# Patient Record
Sex: FEMALE | Race: WHITE | Employment: FULL TIME | URBAN - METROPOLITAN AREA
[De-identification: names, ages, dates, MRNs, and addresses within clinical notes are randomized per-mention and may not be internally consistent; named-entity substitution may affect disease eponyms.]

---

## 2017-02-06 DIAGNOSIS — I10 ESSENTIAL HYPERTENSION: ICD-10-CM

## 2017-02-06 RX ORDER — METOPROLOL SUCCINATE 25 MG/1
TABLET, EXTENDED RELEASE ORAL
Qty: 30 TABLET | Refills: 0 | Status: SHIPPED | OUTPATIENT
Start: 2017-02-06 | End: 2017-03-10 | Stop reason: SDUPTHER

## 2017-03-10 DIAGNOSIS — I10 ESSENTIAL HYPERTENSION: ICD-10-CM

## 2017-03-10 RX ORDER — METOPROLOL SUCCINATE 25 MG/1
TABLET, EXTENDED RELEASE ORAL
Qty: 15 TABLET | Refills: 0 | Status: SHIPPED | OUTPATIENT
Start: 2017-03-10 | End: 2017-05-24 | Stop reason: SDUPTHER

## 2017-05-24 ENCOUNTER — OFFICE VISIT (OUTPATIENT)
Dept: FAMILY MEDICINE CLINIC | Age: 47
End: 2017-05-24

## 2017-05-24 VITALS
SYSTOLIC BLOOD PRESSURE: 116 MMHG | RESPIRATION RATE: 16 BRPM | HEART RATE: 55 BPM | HEIGHT: 68 IN | OXYGEN SATURATION: 99 % | DIASTOLIC BLOOD PRESSURE: 74 MMHG | TEMPERATURE: 98.5 F

## 2017-05-24 DIAGNOSIS — R10.11 RIGHT UPPER QUADRANT ABDOMINAL PAIN: Primary | ICD-10-CM

## 2017-05-24 DIAGNOSIS — I10 ESSENTIAL HYPERTENSION: ICD-10-CM

## 2017-05-24 PROCEDURE — 99213 OFFICE O/P EST LOW 20 MIN: CPT | Performed by: FAMILY MEDICINE

## 2017-05-24 RX ORDER — METOPROLOL SUCCINATE 25 MG/1
TABLET, EXTENDED RELEASE ORAL
Qty: 90 TABLET | Refills: 1 | Status: SHIPPED | OUTPATIENT
Start: 2017-05-24 | End: 2017-11-28 | Stop reason: SDUPTHER

## 2017-05-24 ASSESSMENT — PATIENT HEALTH QUESTIONNAIRE - PHQ9
SUM OF ALL RESPONSES TO PHQ QUESTIONS 1-9: 0
1. LITTLE INTEREST OR PLEASURE IN DOING THINGS: 0
SUM OF ALL RESPONSES TO PHQ9 QUESTIONS 1 & 2: 0
2. FEELING DOWN, DEPRESSED OR HOPELESS: 0

## 2017-05-24 ASSESSMENT — ENCOUNTER SYMPTOMS: RESPIRATORY NEGATIVE: 1

## 2017-06-08 ENCOUNTER — HOSPITAL ENCOUNTER (OUTPATIENT)
Dept: ULTRASOUND IMAGING | Age: 47
Discharge: HOME | End: 2017-06-08
Attending: FAMILY MEDICINE | Admitting: FAMILY MEDICINE

## 2017-06-08 DIAGNOSIS — R10.11 RIGHT UPPER QUADRANT ABDOMINAL PAIN: ICD-10-CM

## 2017-10-13 ENCOUNTER — HOSPITAL ENCOUNTER (OUTPATIENT)
Dept: MAMMOGRAPHY | Age: 47
Discharge: HOME | End: 2017-10-13
Attending: FAMILY MEDICINE | Admitting: FAMILY MEDICINE

## 2017-10-13 DIAGNOSIS — Z12.31 ENCOUNTER FOR SCREENING MAMMOGRAM FOR BREAST CANCER: ICD-10-CM

## 2017-10-16 DIAGNOSIS — R92.0 MICROCALCIFICATION OF RIGHT BREAST ON MAMMOGRAM: Primary | ICD-10-CM

## 2017-10-23 ENCOUNTER — NURSE ONLY (OUTPATIENT)
Dept: MAMMOGRAPHY | Age: 47
End: 2017-10-23

## 2017-10-23 ENCOUNTER — HOSPITAL ENCOUNTER (OUTPATIENT)
Dept: MAMMOGRAPHY | Age: 47
Discharge: HOME | End: 2017-10-23
Attending: FAMILY MEDICINE | Admitting: FAMILY MEDICINE

## 2017-10-23 DIAGNOSIS — R92.8 ABNORMAL MAMMOGRAM OF RIGHT BREAST: Primary | ICD-10-CM

## 2017-10-23 DIAGNOSIS — R92.8 ABNORMAL MAMMOGRAM: ICD-10-CM

## 2017-10-26 ENCOUNTER — HOSPITAL ENCOUNTER (OUTPATIENT)
Dept: INTERVENTIONAL RADIOLOGY/VASCULAR | Age: 47
Discharge: HOME | End: 2017-10-26
Attending: FAMILY MEDICINE | Admitting: FAMILY MEDICINE

## 2017-10-26 DIAGNOSIS — R92.8 ABNORMAL MAMMOGRAM: ICD-10-CM

## 2017-10-26 DIAGNOSIS — Z98.890 STATUS POST RIGHT BREAST BIOPSY: ICD-10-CM

## 2017-10-26 NOTE — PROGRESS NOTES
firm pressure on the site for 10 minutes. If it continues, or your breast is enlarging or becoming hard on the inside, you may have bleeding inside the breast. You should continue to hold pressure and go to the emergency department. 11. Watch for signs of infection, swelling, pain, fever, tenderness, heat or redness around the biopsy site. Call a  Nurse Navigator, Leland Rosenthal or Javed Mccann at [phone] for questions. 12. If you have a problem that cant wait until the next business day, call the Radiology Department at [phone] and ask to speak to the radiologist.  13. The final results of your biopsy are usually available in two to three working days. The results of your biopsy will be sent to your referring physician and either they or the nurse navigator will call you. Patient verbalized understanding and was given a copy of these instructions.

## 2017-10-30 ENCOUNTER — TELEPHONE (OUTPATIENT)
Dept: FAMILY MEDICINE CLINIC | Age: 47
End: 2017-10-30

## 2017-10-30 DIAGNOSIS — R89.7 ABNORMAL BREAST BIOPSY: Primary | ICD-10-CM

## 2017-10-31 ENCOUNTER — TELEPHONE (OUTPATIENT)
Dept: FAMILY MEDICINE CLINIC | Age: 47
End: 2017-10-31

## 2017-10-31 DIAGNOSIS — F41.9 ANXIETY: Primary | ICD-10-CM

## 2017-10-31 DIAGNOSIS — R92.8 ABNORMAL MAMMOGRAM: Primary | ICD-10-CM

## 2017-10-31 RX ORDER — ALPRAZOLAM 0.25 MG/1
TABLET ORAL
Qty: 2 TABLET | Refills: 0 | Status: SHIPPED | OUTPATIENT
Start: 2017-10-31 | End: 2017-12-07

## 2017-10-31 NOTE — TELEPHONE ENCOUNTER
Patient has to go for a second breast biopsy. She was speaking to a nurse care coordinator and she was telling her how anxious she was at the first one, and now has to do it again. The nurse suggested she call you and ask if you would be willing to give her something to help with the anxiety. Maybe 2 pills, one to take the night before and one to take the day of the procedure.   [address] [place])

## 2017-10-31 NOTE — TELEPHONE ENCOUNTER
[facility] calling to say the patient needs another order for the Stereotatic breast biopsy of the Right breast. She had one done previously, but they need her to come in for another one for same breast, different site. Please place order in system. The test date will be 11/13/17.

## 2017-11-13 ENCOUNTER — HOSPITAL ENCOUNTER (OUTPATIENT)
Dept: INTERVENTIONAL RADIOLOGY/VASCULAR | Age: 47
Discharge: HOME | End: 2017-11-13
Attending: FAMILY MEDICINE | Admitting: FAMILY MEDICINE

## 2017-11-13 DIAGNOSIS — R92.8 ABNORMAL MAMMOGRAM: ICD-10-CM

## 2017-11-13 DIAGNOSIS — R92.0 ABNORMAL FINDING ON MAMMOGRAPHY, MICROCALCIFICATION: ICD-10-CM

## 2017-11-13 NOTE — PROGRESS NOTES
Dr. Paulie Burgess had more films taken before biopsy. Talked to patient. Walked the patient to the room, discussed the procedure in detail and answered questions. Verbalizes understanding. Reviewed the health history, allergies and medications. Consent form signed. Nurse Navigator reviewed the following instructions with the patient. Care After a Needle Core Biopsy (Stereotactic, Ultrasound or MRI biopsy)   1. Apply ice pack to the breast (inside of your bra) 15 minutes on then 15 minutes off for first 3 hours after your procedure, then as needed until bedtime to relieve/minimize potential swelling and bruising. 2. Wear a firm fitting bra or sports bra for 24-48 hours (including sleep) to minimize movement and support. 3. The local anesthetic wears off about 3 hours after the biopsy. You may use over the counter medication of choice for pain as needed. Some discomfort/tenderness is not unusual.   4. You may resume all stopped medications:  ______________________________. 5. You may return to work after the biopsy, but no heavy lifting (more than 5-10 lbs), strenuous exercise, weight lifting, repetitive movement on the affected side, including household activities, for 48 hours. 6. To prevent infection: do not submerge the biopsy site under water (bathing, swimming, hot tub) until steri-strips are removed. 7. Leave the clear covering and dressing on for 24 hours then shower and remove the covering and dressing but keep the thin strips of tape (steri-strips) in place for 5-7 days, keeping the area clean and dry. 8. Some bruising is normal after a biopsy. It may be present when you remove the dressing or may appear within the next 48 hours. It will disappear in a few days to a couple of weeks. 9. For several days to several weeks you may have some tenderness, twinges and a little bump where the needle went into the skin.   This can be bothersome but it is not abnormal, and will go

## 2017-11-16 ENCOUNTER — HOSPITAL ENCOUNTER (OUTPATIENT)
Dept: OTHER | Age: 47
Discharge: HOME | End: 2017-11-16
Attending: SURGERY | Admitting: SURGERY

## 2017-11-16 ENCOUNTER — SURG/PROC ORDERS (OUTPATIENT)
Dept: SURGERY | Age: 47
End: 2017-11-16

## 2017-11-16 ENCOUNTER — INITIAL CONSULT (OUTPATIENT)
Dept: SURGERY | Age: 47
End: 2017-11-16

## 2017-11-16 VITALS
DIASTOLIC BLOOD PRESSURE: 63 MMHG | HEIGHT: 69 IN | BODY MASS INDEX: 22.22 KG/M2 | HEART RATE: 64 BPM | WEIGHT: 150 LBS | SYSTOLIC BLOOD PRESSURE: 120 MMHG | TEMPERATURE: 98.5 F

## 2017-11-16 DIAGNOSIS — N60.99 ATYPICAL DUCTAL HYPERPLASIA OF BREAST: Primary | ICD-10-CM

## 2017-11-16 DIAGNOSIS — Z91.89 AT HIGH RISK FOR BREAST CANCER: ICD-10-CM

## 2017-11-16 LAB
CREAT SERPL-MCNC: 0.9 MG/DL (ref 0.6–1.1)
GFR AFRICAN AMERICAN: >60
GFR NON-AFRICAN AMERICAN: >60

## 2017-11-16 PROCEDURE — 99204 OFFICE O/P NEW MOD 45 MIN: CPT | Performed by: SURGERY

## 2017-11-16 RX ORDER — SODIUM CHLORIDE, SODIUM LACTATE, POTASSIUM CHLORIDE, CALCIUM CHLORIDE 600; 310; 30; 20 MG/100ML; MG/100ML; MG/100ML; MG/100ML
INJECTION, SOLUTION INTRAVENOUS CONTINUOUS
Status: CANCELLED | OUTPATIENT
Start: 2017-11-16

## 2017-11-16 RX ORDER — SODIUM CHLORIDE 0.9 % (FLUSH) 0.9 %
10 SYRINGE (ML) INJECTION EVERY 12 HOURS SCHEDULED
Status: CANCELLED | OUTPATIENT
Start: 2017-11-16

## 2017-11-16 RX ORDER — LIDOCAINE HYDROCHLORIDE 10 MG/ML
0.1 INJECTION, SOLUTION EPIDURAL; INFILTRATION; INTRACAUDAL; PERINEURAL
Status: CANCELLED | OUTPATIENT
Start: 2017-11-16 | End: 2017-11-16

## 2017-11-16 RX ORDER — SODIUM CHLORIDE 0.9 % (FLUSH) 0.9 %
10 SYRINGE (ML) INJECTION PRN
Status: CANCELLED | OUTPATIENT
Start: 2017-11-16

## 2017-11-16 ASSESSMENT — ENCOUNTER SYMPTOMS
GASTROINTESTINAL NEGATIVE: 1
RESPIRATORY NEGATIVE: 1
EYES NEGATIVE: 1

## 2017-11-16 NOTE — PATIENT INSTRUCTIONS
Patient Education        Breast Self-Exam: Care Instructions  Your Care Instructions  A breast self-exam is when you check your breasts for lumps or changes. This regular exam helps you learn how your breasts normally look and feel. Most breast problems or changes are not because of cancer. Breast self-exam is not a substitute for a mammogram. Having regular breast exams by your doctor and regular mammograms improve your chances of finding any problems with your breasts. Some women set a time each month to do a step-by-step breast self-exam. Other women like a less formal system. They might look at their breasts as they brush their teeth, or feel their breasts once in a while in the shower. If you notice a change in your breast, tell your doctor. Follow-up care is a key part of your treatment and safety. Be sure to make and go to all appointments, and call your doctor if you are having problems. Its also a good idea to know your test results and keep a list of the medicines you take. How do you do a breast self-exam?  · The best time to examine your breasts is usually one week after your menstrual period begins. Your breasts should not be tender then. If you do not have periods, you might do your exam on a day of the month that is easy to remember. · To examine your breasts:  ¨ Remove all your clothes above the waist and lie down. When you are lying down, your breast tissue spreads evenly over your chest wall, which makes it easier to feel all your breast tissue. ¨ Use the padsnot the fingertipsof the 3 middle fingers of your left hand to check your right breast. Move your fingers slowly in small coin-sized circles that overlap. ¨ Use three levels of pressure to feel of all your breast tissue. Use light pressure to feel the tissue close to the skin surface. Use medium pressure to feel a little deeper. Use firm pressure to feel your tissue close to your breastbone and ribs.  Use each pressure level to feel your breast tissue before moving on to the next spot. ¨ Check your entire breast, moving up and down as if following a strip from the collarbone to the bra line, and from the armpit to the ribs. Repeat until you have covered the entire breast.  ¨ Repeat this procedure for your left breast, using the pads of the 3 middle fingers of your right hand. · To examine your breasts while in the shower:  ¨ Place one arm over your head and lightly soap your breast on that side. ¨ Using the pads of your fingers, gently move your hand over your breast (in the strip pattern described above), feeling carefully for any lumps or changes. ¨ Repeat for the other breast.  · Have your doctor inspect anything you notice to see if you need further testing. Where can you learn more? Go to [email or web address]://[email or web address].[email or web address]. org and sign in to your [employer] account. Enter P148 in the Mulu box to learn more about \"Breast Self-Exam: Care Instructions. \"     If you do not have an account, please click on the \"Sign Up Now\" link. Current as of: July 26, 2016  Content Version: 11.3  © 1863-5615 [employer]. Care instructions adapted under license by [facility] ([facility]). If you have questions about a medical condition or this instruction, always ask your healthcare professional. [address] any warranty or liability for your use of this information.

## 2017-11-16 NOTE — PROGRESS NOTES
Pathology  FINAL SURGICAL PATHOLOGY REPORT  Patient Name: Tanesha Brandt           Accession No:  [id number]   Age Sex:   1970    47 Y / F       Location:      [facility]  Account No:   [de-identified]                 Collected:     2017  Med Rec No:    [id number]                 Received:      2017  Attend Phys:   Yun Feliciano MD             Completed:     11/15/2017  Perform Phys: Yun Feliciano MD            FINAL DIAGNOSIS:    Breast, right, 9 o' clock, biopsy:     - Atypical ductal hyperplasia occurring in a background of breast       tissue with mild fibrocystic changes and microcalcifications. COMMENT: Immunohistochemical staining with appropriate controls reveals  the cells show loss of  and CK5/6, supporting the diagnosis. This  case was seen in intradepartmental consultation with consensus. Correlation with clinical and radiologic findings is recommended with an  excision if more definitive classification is clinically warranted. [address] [place] [address]          Department of Pathology  FINAL SURGICAL PATHOLOGY REPORT  Patient Name: Tanesha Brandt         Accession No:  [id number]   Age Sex:   1970    47 Y / F       Location:      [facility]  Account No:   [de-identified]                 Collected:     10/26/2017  Med Rec No:    [id number]                 Received:      10/26/2017  Attend Phys:   Yun Feliciano MD             Completed:     10/30/2017  Perform Phys: Yun Feliciano MD            FINAL DIAGNOSIS:    Breast, right, biopsy:     - Atypical ductal hyperplasia occurring in a background of breast       tissue with fibrocystic changes and microcalcifications.     COMMENT:  CK5/6 and  immunohistochemical staining with appropriate controls  reveal a loss of staining in foci with morphologic findings suggestive of  atypia, supporting the above diagnosis. Gulshan Montanez and radiologic  correlation is recommended.    GARDY/GRADY      Preoperative Diagnosis: regular rate. Extremities appear well perfused. Pulmonary: respirations are non-labored and without audible distress  Lymphatics: no palpable axillary or cervical lymphadenopathy. Skin: No rash noted. No erythema. Neurologic: alert and oriented. Assessment/Plan: Ms. Sondra Crenshaw is a 56y.o. year-old woman with right breast atypical ductal hyperplasia    I reviewed with Ms. Sondra Crenshaw her most recent breast imaging and biopsy results. 1. We discussed the pathophysiology of her biopsy. Due to the 10-15% upgrade risk of invasive cancer or noninvasive cancer in the area surrounding her biopsy cavity, we recommended a 2 site excisional breast biopsy in the operating room. We discussed the technique of needle localization. I explained that on the day of surgery she'll be taken to radiology where a needle and small wire will be advanced to the location of the lesion. This will be done by targeting the lesion, near which the clip was placed post biopsy. This is performed using either mammographic or ultrasound guidance. We would then proceed to the operating room to remove the abnormal area within the breast. We discussed the risks and benefits of surgery which include but are not limited to bleeding, infection, wound complications, unappealing cosmetics, and the need to return to the operating room for a second procedure based on final pathology. 2. We discussed her pathology in detail including the upgrade risk to any invasive or noninvasive carcinoma. We discussed that her risk of a future cancer is at least 4 times higher in patients with atypical hyperplasia than the general population. We discussed the course of action should we find a noninvasive or invasive cancer on her final pathology. We discussed that this could lead to additional medical or surgical treatments. 3. We discussed assessing her breast cancer risk.  We performed a risk assessment using  ASH Breast Cancer Risk Evaluation Tool

## 2017-11-21 ENCOUNTER — HOSPITAL ENCOUNTER (OUTPATIENT)
Dept: MRI IMAGING | Age: 47
Discharge: HOME | End: 2017-11-21
Attending: SURGERY | Admitting: SURGERY

## 2017-11-21 DIAGNOSIS — N60.99 ATYPICAL DUCTAL HYPERPLASIA OF BREAST: ICD-10-CM

## 2017-11-21 DIAGNOSIS — Z91.89 AT HIGH RISK FOR BREAST CANCER: ICD-10-CM

## 2017-11-21 RX ORDER — SODIUM CHLORIDE 0.9 % (FLUSH) 0.9 %
10 SYRINGE (ML) INJECTION ONCE
Status: COMPLETED | OUTPATIENT
Start: 2017-11-21 | End: 2017-11-21

## 2017-11-21 RX ADMIN — Medication 10 ML: at 11:05

## 2017-11-22 ENCOUNTER — TELEPHONE (OUTPATIENT)
Dept: SURGERY | Age: 47
End: 2017-11-22

## 2017-11-22 DIAGNOSIS — N60.99 ATYPICAL DUCTAL HYPERPLASIA OF BREAST: Primary | ICD-10-CM

## 2017-11-28 DIAGNOSIS — I10 ESSENTIAL HYPERTENSION: ICD-10-CM

## 2017-11-28 RX ORDER — METOPROLOL SUCCINATE 25 MG/1
TABLET, EXTENDED RELEASE ORAL
Qty: 30 TABLET | Refills: 0 | Status: SHIPPED | OUTPATIENT
Start: 2017-11-28 | End: 2017-12-07 | Stop reason: SDUPTHER

## 2017-11-28 NOTE — TELEPHONE ENCOUNTER
She called in she has a new patient appoint with you on 12-7-17. She was a Dr Luis Felipe Bonner patient    She is going to run out of her Bp meds before then, can you send her enough I to hold her over?

## 2017-12-07 ENCOUNTER — HOSPITAL ENCOUNTER (OUTPATIENT)
Dept: INTERVENTIONAL RADIOLOGY/VASCULAR | Age: 47
Discharge: HOME | End: 2017-12-07
Attending: SURGERY | Admitting: SURGERY

## 2017-12-07 ENCOUNTER — OFFICE VISIT (OUTPATIENT)
Dept: FAMILY MEDICINE CLINIC | Age: 47
End: 2017-12-07

## 2017-12-07 VITALS
HEART RATE: 86 BPM | OXYGEN SATURATION: 98 % | SYSTOLIC BLOOD PRESSURE: 124 MMHG | TEMPERATURE: 98.2 F | DIASTOLIC BLOOD PRESSURE: 74 MMHG | HEIGHT: 69 IN | RESPIRATION RATE: 16 BRPM

## 2017-12-07 DIAGNOSIS — N60.91 ATYPICAL HYPERPLASIA OF RIGHT BREAST: ICD-10-CM

## 2017-12-07 DIAGNOSIS — F41.9 ANXIETY: ICD-10-CM

## 2017-12-07 DIAGNOSIS — Z01.818 PRE-OP EXAM: ICD-10-CM

## 2017-12-07 DIAGNOSIS — I10 ESSENTIAL HYPERTENSION: ICD-10-CM

## 2017-12-07 DIAGNOSIS — N60.99 ATYPICAL DUCTAL HYPERPLASIA OF BREAST: ICD-10-CM

## 2017-12-07 PROCEDURE — 93000 ELECTROCARDIOGRAM COMPLETE: CPT | Performed by: NURSE PRACTITIONER

## 2017-12-07 PROCEDURE — 99214 OFFICE O/P EST MOD 30 MIN: CPT | Performed by: NURSE PRACTITIONER

## 2017-12-07 RX ORDER — METOPROLOL SUCCINATE 25 MG/1
TABLET, EXTENDED RELEASE ORAL
Qty: 90 TABLET | Refills: 1 | Status: SHIPPED | OUTPATIENT
Start: 2017-12-07 | End: 2018-06-27 | Stop reason: SDUPTHER

## 2017-12-07 RX ORDER — M-VIT,TX,IRON,MINS/CALC/FOLIC 27MG-0.4MG
1 TABLET ORAL DAILY
COMMUNITY

## 2017-12-07 RX ORDER — UBIDECARENONE 75 MG
50 CAPSULE ORAL DAILY
COMMUNITY

## 2017-12-07 RX ORDER — DIAZEPAM 10 MG/1
TABLET ORAL
Qty: 1 TABLET | Refills: 0 | Status: SHIPPED | OUTPATIENT
Start: 2017-12-07 | End: 2017-12-08

## 2017-12-07 RX ORDER — 0.9 % SODIUM CHLORIDE 0.9 %
100 VIAL (ML) INJECTION
Status: COMPLETED | OUTPATIENT
Start: 2017-12-07 | End: 2017-12-07

## 2017-12-07 RX ADMIN — Medication 100 ML: at 16:02

## 2017-12-07 NOTE — PROGRESS NOTES
Walked the patient to the room, discussed the procedure in detail and answered questions. Verbalizes understanding. Reviewed the health history, allergies and medications. Consent form signed. Nurse Navigator reviewed the following instructions with the patient. Care After a Needle Core Biopsy (Stereotactic, Ultrasound or MRI biopsy)   1. Apply ice pack to the breast (inside of your bra) 15 minutes on then 15 minutes off for first 3 hours after your procedure, then as needed until bedtime to relieve/minimize potential swelling and bruising. 2. Wear a firm fitting bra or sports bra for 24-48 hours (including sleep) to minimize movement and support. 3. The local anesthetic wears off about 3 hours after the biopsy. You may use over the counter medication of choice for pain as needed. Some discomfort/tenderness is not unusual.   4. You may resume all stopped medications:  ______________________________. 5. You may return to work after the biopsy, but no heavy lifting (more than 5-10 lbs), strenuous exercise, weight lifting, repetitive movement on the affected side, including household activities, for 48 hours. 6. To prevent infection: do not submerge the biopsy site under water (bathing, swimming, hot tub) until steri-strips are removed. 7. Leave the clear covering and dressing on for 24 hours then shower and remove the covering and dressing but keep the thin strips of tape (steri-strips) in place for 5-7 days, keeping the area clean and dry. 8. Some bruising is normal after a biopsy. It may be present when you remove the dressing or may appear within the next 48 hours. It will disappear in a few days to a couple of weeks. 9. For several days to several weeks you may have some tenderness, twinges and a little bump where the needle went into the skin. This can be bothersome but it is not abnormal, and will go away.   10. If excessive bleeding, apply firm pressure on the site for 10 minutes. If it continues, or your breast is enlarging or becoming hard on the inside, you may have bleeding inside the breast. You should continue to hold pressure and go to the emergency department. 11. Watch for signs of infection, swelling, pain, fever, tenderness, heat or redness around the biopsy site. Call a  Nurse Navigator, Amy Andres or Gaurav Sales at [phone] for questions. 12. If you have a problem that cant wait until the next business day, call the Radiology Department at [phone] and ask to speak to the radiologist.  13. The final results of your biopsy are usually available in two to three working days. The results of your biopsy will be sent to your referring physician and either they or the nurse navigator will call you. Patient verbalized understanding and was given a copy of these instructions.

## 2017-12-07 NOTE — PROGRESS NOTES
Preoperative Consultation      Zeyad King  YOB: 1970    Date of Service:  12/7/2017    Vitals:    12/07/17 0804   BP: 124/74   Site: Left Arm   Position: Sitting   Cuff Size: Medium Adult   Pulse: 86   Resp: 16   Temp: 98.2 °F (36.8 °C)   TempSrc: Oral   SpO2: 98%   Height: 5' 8.5\" (1.74 m)      Wt Readings from Last 2 Encounters:   11/16/17 150 lb (68 kg)   03/21/13 150 lb (68 kg)     BP Readings from Last 3 Encounters:   12/07/17 124/74   11/16/17 120/63   05/24/17 116/74        Chief Complaint   Patient presents with   Suki Rodriguez Pre-op Exam     Excisional breast biopsy 12/13/17 Dr. Sabina Velasquez [Pseudoephedrine Hcl]      Nausea, increased heart rate     Outpatient Prescriptions Marked as Taking for the 12/7/17 encounter (Office Visit) with Wayne Rubi NP   Medication Sig Dispense Refill    OMEGA 3-6-9 FATTY ACIDS PO Take by mouth      Multiple Vitamins-Minerals (THERAPEUTIC MULTIVITAMIN-MINERALS) tablet Take 1 tablet by mouth daily      vitamin B-12 (CYANOCOBALAMIN) 100 MCG tablet Take 50 mcg by mouth daily      metoprolol succinate (TOPROL XL) 25 MG extended release tablet TAKE 1 TABLET BY MOUTH EVERY DAY 30 tablet 0    loratadine (CLARITIN) 10 MG tablet Take 10 mg by mouth daily      LEVORA 0.15/30, 28, 0.15-30 MG-MCG per tablet          This patient with atypical ductal hyperplasia of breast presents to the office today for a preoperative consultation at the request of surgeon,  who plans on performing right breast lesion excision biopsy on December 13 at [facility]. HTN, on metoprolol, controlled well. Family hx of CAD in father, pt had CT heart calcium score- was 0 about a year.   Very anxious about MRI, Xanax did not help in the past before the test.    Planned anesthesia: General   Known anesthesia problems: None, except nausea and vomiting  Bleeding risk: No recent or remote history of abnormal bleeding  Personal or FH of DVT/PE: No    Patient objection to receiving blood products: No    Patient Active Problem List   Diagnosis    Essential hypertension    Right upper quadrant abdominal pain       History reviewed. No pertinent past medical history. History reviewed. No pertinent surgical history. Family History   Problem Relation Age of Onset    No Known Problems Mother     Heart Disease Father 52     CAD, hx of CABG    No Known Problems Brother     No Known Problems Brother      Social History     Social History    Marital status:      Spouse name: N/A    Number of children: N/A    Years of education: N/A     Occupational History    Not on file. Social History Main Topics    Smoking status: Never Smoker    Smokeless tobacco: Never Used    Alcohol use No    Drug use: No    Sexual activity: Yes     Partners: Male     Other Topics Concern    Not on file     Social History Narrative    No narrative on file       Review of Systems  A comprehensive review of systems was negative except for what was noted in the HPI. Physical Exam   Constitutional: She is oriented to person, place, and time. She appears well-developed and well-nourished. No distress. HENT:   Head: Normocephalic and atraumatic. Mouth/Throat: Uvula is midline, oropharynx is clear and moist and mucous membranes are normal.   Eyes: Conjunctivae and EOM are normal. Pupils are equal, round, and reactive to light. Neck: Trachea normal and normal range of motion. Neck supple. No JVD present. Carotid bruit is not present. No mass and no thyromegaly present. Cardiovascular: Normal rate, regular rhythm, normal heart sounds and intact distal pulses. No murmur heard. Pulmonary/Chest: Effort normal and breath sounds normal.   Abdominal: Soft. Bowel sounds are normal. She exhibits no distension and no mass. There is no hepatosplenomegaly. No tenderness. Musculoskeletal: She exhibits no edema and no tenderness.

## 2017-12-08 ENCOUNTER — HOSPITAL ENCOUNTER (OUTPATIENT)
Dept: OTHER | Age: 47
Discharge: HOME | End: 2017-12-08
Attending: NURSE PRACTITIONER | Admitting: NURSE PRACTITIONER

## 2017-12-08 LAB
EKG ATRIAL RATE: 62 BPM
EKG DIAGNOSIS: NORMAL
EKG P AXIS: 80 DEGREES
EKG P-R INTERVAL: 140 MS
EKG Q-T INTERVAL: 416 MS
EKG QRS DURATION: 80 MS
EKG QTC CALCULATION (BAZETT): 422 MS
EKG R AXIS: 83 DEGREES
EKG T AXIS: 56 DEGREES
EKG VENTRICULAR RATE: 62 BPM

## 2017-12-08 PROCEDURE — 93010 ELECTROCARDIOGRAM REPORT: CPT | Performed by: INTERNAL MEDICINE

## 2017-12-13 ENCOUNTER — HOSPITAL ENCOUNTER (OUTPATIENT)
Dept: SURGERY | Age: 47
Discharge: HOME | End: 2017-12-13
Attending: SURGERY | Admitting: SURGERY

## 2017-12-13 VITALS
DIASTOLIC BLOOD PRESSURE: 73 MMHG | WEIGHT: 152.25 LBS | RESPIRATION RATE: 14 BRPM | TEMPERATURE: 98.4 F | HEART RATE: 72 BPM | BODY MASS INDEX: 22.55 KG/M2 | OXYGEN SATURATION: 99 % | SYSTOLIC BLOOD PRESSURE: 108 MMHG | HEIGHT: 69 IN

## 2017-12-13 DIAGNOSIS — R92.8 ABNORMAL MAMMOGRAM: ICD-10-CM

## 2017-12-13 LAB
ABO/RH: NORMAL
ANION GAP SERPL CALCULATED.3IONS-SCNC: 10 MMOL/L (ref 3–16)
ANTIBODY SCREEN: NORMAL
BUN BLDV-MCNC: 13 MG/DL (ref 7–20)
CALCIUM SERPL-MCNC: 9.2 MG/DL (ref 8.3–10.6)
CHLORIDE BLD-SCNC: 103 MMOL/L (ref 99–110)
CO2: 25 MMOL/L (ref 21–32)
CREAT SERPL-MCNC: 0.7 MG/DL (ref 0.6–1.1)
GFR AFRICAN AMERICAN: >60
GFR NON-AFRICAN AMERICAN: >60
GLUCOSE BLD-MCNC: 92 MG/DL (ref 70–99)
HCG(URINE) PREGNANCY TEST: NEGATIVE
HCT VFR BLD CALC: 44.1 % (ref 36–48)
HEMOGLOBIN: 14.6 G/DL (ref 12–16)
MCH RBC QN AUTO: 31.9 PG (ref 26–34)
MCHC RBC AUTO-ENTMCNC: 33.1 G/DL (ref 31–36)
MCV RBC AUTO: 96.4 FL (ref 80–100)
PDW BLD-RTO: 13.2 % (ref 12.4–15.4)
PLATELET # BLD: 235 K/UL (ref 135–450)
PMV BLD AUTO: 9.3 FL (ref 5–10.5)
POTASSIUM SERPL-SCNC: 4.6 MMOL/L (ref 3.5–5.1)
RBC # BLD: 4.58 M/UL (ref 4–5.2)
SODIUM BLD-SCNC: 138 MMOL/L (ref 136–145)
WBC # BLD: 6.2 K/UL (ref 4–11)

## 2017-12-13 PROCEDURE — 19125 EXCISION BREAST LESION: CPT | Performed by: SURGERY

## 2017-12-13 PROCEDURE — 14001 TIS TRNFR TRUNK 10.1-30SQCM: CPT | Performed by: SURGERY

## 2017-12-13 RX ORDER — MEPERIDINE HYDROCHLORIDE 25 MG/ML
12.5 INJECTION INTRAMUSCULAR; INTRAVENOUS; SUBCUTANEOUS EVERY 5 MIN PRN
Status: DISCONTINUED | OUTPATIENT
Start: 2017-12-13 | End: 2017-12-14 | Stop reason: HOSPADM

## 2017-12-13 RX ORDER — SODIUM CHLORIDE, SODIUM LACTATE, POTASSIUM CHLORIDE, CALCIUM CHLORIDE 600; 310; 30; 20 MG/100ML; MG/100ML; MG/100ML; MG/100ML
INJECTION, SOLUTION INTRAVENOUS CONTINUOUS
Status: DISCONTINUED | OUTPATIENT
Start: 2017-12-13 | End: 2017-12-14 | Stop reason: HOSPADM

## 2017-12-13 RX ORDER — PROMETHAZINE HYDROCHLORIDE 25 MG/ML
6.25 INJECTION, SOLUTION INTRAMUSCULAR; INTRAVENOUS EVERY 30 MIN PRN
Status: DISCONTINUED | OUTPATIENT
Start: 2017-12-13 | End: 2017-12-14 | Stop reason: HOSPADM

## 2017-12-13 RX ORDER — DIPHENHYDRAMINE HYDROCHLORIDE 50 MG/ML
12.5 INJECTION INTRAMUSCULAR; INTRAVENOUS
Status: ACTIVE | OUTPATIENT
Start: 2017-12-13 | End: 2017-12-13

## 2017-12-13 RX ORDER — HYDROCODONE BITARTRATE AND ACETAMINOPHEN 5; 325 MG/1; MG/1
1 TABLET ORAL PRN
Status: COMPLETED | OUTPATIENT
Start: 2017-12-13 | End: 2017-12-13

## 2017-12-13 RX ORDER — HYDROMORPHONE HCL 110MG/55ML
0.5 PATIENT CONTROLLED ANALGESIA SYRINGE INTRAVENOUS EVERY 5 MIN PRN
Status: DISCONTINUED | OUTPATIENT
Start: 2017-12-13 | End: 2017-12-14 | Stop reason: HOSPADM

## 2017-12-13 RX ORDER — FENTANYL CITRATE 50 UG/ML
50 INJECTION, SOLUTION INTRAMUSCULAR; INTRAVENOUS EVERY 5 MIN PRN
Status: DISCONTINUED | OUTPATIENT
Start: 2017-12-13 | End: 2017-12-14 | Stop reason: HOSPADM

## 2017-12-13 RX ORDER — HYDROMORPHONE HCL 110MG/55ML
0.25 PATIENT CONTROLLED ANALGESIA SYRINGE INTRAVENOUS EVERY 5 MIN PRN
Status: DISCONTINUED | OUTPATIENT
Start: 2017-12-13 | End: 2017-12-14 | Stop reason: HOSPADM

## 2017-12-13 RX ORDER — SODIUM CHLORIDE 0.9 % (FLUSH) 0.9 %
10 SYRINGE (ML) INJECTION PRN
Status: DISCONTINUED | OUTPATIENT
Start: 2017-12-13 | End: 2017-12-14 | Stop reason: HOSPADM

## 2017-12-13 RX ORDER — LIDOCAINE HYDROCHLORIDE 10 MG/ML
0.1 INJECTION, SOLUTION EPIDURAL; INFILTRATION; INTRACAUDAL; PERINEURAL
Status: ACTIVE | OUTPATIENT
Start: 2017-12-13 | End: 2017-12-13

## 2017-12-13 RX ORDER — SODIUM CHLORIDE 9 MG/ML
INJECTION, SOLUTION INTRAVENOUS CONTINUOUS
Status: DISCONTINUED | OUTPATIENT
Start: 2017-12-13 | End: 2017-12-14 | Stop reason: HOSPADM

## 2017-12-13 RX ORDER — CLINDAMYCIN PHOSPHATE 900 MG/50ML
900 INJECTION INTRAVENOUS
Status: DISCONTINUED | OUTPATIENT
Start: 2017-12-13 | End: 2017-12-13

## 2017-12-13 RX ORDER — HYDROCODONE BITARTRATE AND ACETAMINOPHEN 5; 325 MG/1; MG/1
2 TABLET ORAL PRN
Status: COMPLETED | OUTPATIENT
Start: 2017-12-13 | End: 2017-12-13

## 2017-12-13 RX ORDER — LIDOCAINE HYDROCHLORIDE 10 MG/ML
1 INJECTION, SOLUTION EPIDURAL; INFILTRATION; INTRACAUDAL; PERINEURAL
Status: ACTIVE | OUTPATIENT
Start: 2017-12-13 | End: 2017-12-13

## 2017-12-13 RX ORDER — SCOLOPAMINE TRANSDERMAL SYSTEM 1 MG/1
1 PATCH, EXTENDED RELEASE TRANSDERMAL
Status: DISCONTINUED | OUTPATIENT
Start: 2017-12-13 | End: 2017-12-14 | Stop reason: HOSPADM

## 2017-12-13 RX ORDER — HYDROCODONE BITARTRATE AND ACETAMINOPHEN 5; 325 MG/1; MG/1
TABLET ORAL
Qty: 40 TABLET | Refills: 0 | Status: SHIPPED | OUTPATIENT
Start: 2017-12-13 | End: 2017-12-22

## 2017-12-13 RX ORDER — SODIUM CHLORIDE 0.9 % (FLUSH) 0.9 %
10 SYRINGE (ML) INJECTION EVERY 12 HOURS SCHEDULED
Status: DISCONTINUED | OUTPATIENT
Start: 2017-12-13 | End: 2017-12-14 | Stop reason: HOSPADM

## 2017-12-13 RX ORDER — CEFAZOLIN SODIUM 2 G/100ML
2 INJECTION, SOLUTION INTRAVENOUS
Status: COMPLETED | OUTPATIENT
Start: 2017-12-13 | End: 2017-12-13

## 2017-12-13 RX ORDER — FENTANYL CITRATE 50 UG/ML
25 INJECTION, SOLUTION INTRAMUSCULAR; INTRAVENOUS EVERY 5 MIN PRN
Status: DISCONTINUED | OUTPATIENT
Start: 2017-12-13 | End: 2017-12-14 | Stop reason: HOSPADM

## 2017-12-13 RX ADMIN — Medication 0.25 MG: at 11:26

## 2017-12-13 RX ADMIN — CEFAZOLIN SODIUM 2 G: 2 INJECTION, SOLUTION INTRAVENOUS at 09:30

## 2017-12-13 RX ADMIN — SODIUM CHLORIDE, SODIUM LACTATE, POTASSIUM CHLORIDE, CALCIUM CHLORIDE: 600; 310; 30; 20 INJECTION, SOLUTION INTRAVENOUS at 09:29

## 2017-12-13 RX ADMIN — Medication 0.25 MG: at 11:40

## 2017-12-13 RX ADMIN — HYDROCODONE BITARTRATE AND ACETAMINOPHEN 1 TABLET: 5; 325 TABLET ORAL at 12:06

## 2017-12-13 ASSESSMENT — PAIN SCALES - GENERAL
PAINLEVEL_OUTOF10: 4
PAINLEVEL_OUTOF10: 3
PAINLEVEL_OUTOF10: 5
PAINLEVEL_OUTOF10: 4

## 2017-12-13 ASSESSMENT — PAIN DESCRIPTION - ORIENTATION: ORIENTATION: RIGHT

## 2017-12-13 ASSESSMENT — PAIN DESCRIPTION - LOCATION: LOCATION: BREAST

## 2017-12-13 ASSESSMENT — PAIN DESCRIPTION - DESCRIPTORS: DESCRIPTORS: ACHING;DISCOMFORT

## 2017-12-13 ASSESSMENT — PAIN - FUNCTIONAL ASSESSMENT: PAIN_FUNCTIONAL_ASSESSMENT: 0-10

## 2017-12-13 ASSESSMENT — PAIN DESCRIPTION - FREQUENCY: FREQUENCY: CONTINUOUS

## 2017-12-13 ASSESSMENT — PAIN DESCRIPTION - PAIN TYPE: TYPE: SURGICAL PAIN

## 2017-12-13 NOTE — ANESTHESIA PRE-OP
Department of Anesthesiology  Preprocedure Note       Name:  Faith Grimes   Age:  56 y.o.  :  1970                                          MRN:  [id number]         Date:  2017      Surgeon:    Procedure:    Medications prior to admission:   Prior to Admission medications    Medication Sig Start Date End Date Taking?  Authorizing Provider   OMEGA 3-6-9 FATTY ACIDS PO Take by mouth    Historical Provider, MD   Multiple Vitamins-Minerals (THERAPEUTIC MULTIVITAMIN-MINERALS) tablet Take 1 tablet by mouth daily    Historical Provider, MD   vitamin B-12 (CYANOCOBALAMIN) 100 MCG tablet Take 50 mcg by mouth daily    Historical Provider, MD   metoprolol succinate (TOPROL XL) 25 MG extended release tablet TAKE 1 TABLET BY MOUTH EVERY DAY 17   Mita Cramer NP   loratadine (CLARITIN) 10 MG tablet Take 10 mg by mouth daily    Historical Provider, MD   Aleoj Yolanda 0.15/30, 28, 0.15-30 MG-MCG per tablet  13   Historical Provider, MD       Current medications:    Current Outpatient Prescriptions   Medication Sig Dispense Refill    OMEGA 3-6-9 FATTY ACIDS PO Take by mouth      Multiple Vitamins-Minerals (THERAPEUTIC MULTIVITAMIN-MINERALS) tablet Take 1 tablet by mouth daily      vitamin B-12 (CYANOCOBALAMIN) 100 MCG tablet Take 50 mcg by mouth daily      metoprolol succinate (TOPROL XL) 25 MG extended release tablet TAKE 1 TABLET BY MOUTH EVERY DAY 90 tablet 1    loratadine (CLARITIN) 10 MG tablet Take 10 mg by mouth daily      LEVORA 0.15/30, 28, 0.15-30 MG-MCG per tablet        Current Facility-Administered Medications   Medication Dose Route Frequency Provider Last Rate Last Dose    ceFAZolin (ANCEF) 2 g in dextrose 4 % 100 mL IVPB (premix)  2 g Intravenous On Call to [facility], MD        lactated ringers infusion   Intravenous Continuous Viviane Barboza MD        sodium chloride flush 0.9 % injection 10 mL  10 mL Intravenous 2 times per day Viviane Barboza MD       [facility] Endo/Other:                     Abdominal:           Vascular:                                        Anesthesia Plan      general     ASA 2       Induction: intravenous. MIPS: Postoperative opioids intended, Prophylactic antiemetics administered and Postoperative trial extubation. Anesthetic plan and risks discussed with patient. Plan discussed with CRNA.     Attending anesthesiologist reviewed and agrees with Amarilys Solano MD   12/13/2017

## 2017-12-13 NOTE — PROGRESS NOTES
Pt received into bay 6 from OR. Pt drowsy yet oriented. Room air. Vss. Pt stable. Report obtained. Right breast dressing with fluff and surgical glue. surgi bra in place. HOB at 30 degrees. Will monitor.

## 2017-12-13 NOTE — PROGRESS NOTES
Patient education given  and the patient expresses understanding and acceptance of instructions. Joellen Hampton 12/13/2017 2:09 PM  Discharge instructions reviewed with patient/responsible adult. All home medications have been reviewed, questions answered and patient verbalized understanding. Discharge instructions signed and copies given.

## 2017-12-13 NOTE — H&P
H&P Update    The patient's most recent H&P, office notes, breast imaging, and pathology were reviewed. Patient examined and laterality marked. There has been no changes. We will plan to proceed with a right 2 site excisional breast biopsy today.     Koko Sutton

## 2017-12-22 ENCOUNTER — OFFICE VISIT (OUTPATIENT)
Dept: SURGERY | Age: 47
End: 2017-12-22

## 2017-12-22 VITALS
HEART RATE: 66 BPM | SYSTOLIC BLOOD PRESSURE: 105 MMHG | TEMPERATURE: 97 F | DIASTOLIC BLOOD PRESSURE: 69 MMHG | HEIGHT: 69 IN

## 2017-12-22 DIAGNOSIS — Z91.89 AT HIGH RISK FOR BREAST CANCER: ICD-10-CM

## 2017-12-22 DIAGNOSIS — N60.91 ATYPICAL DUCTAL HYPERPLASIA OF RIGHT BREAST: Primary | ICD-10-CM

## 2017-12-22 PROCEDURE — 99024 POSTOP FOLLOW-UP VISIT: CPT | Performed by: SURGERY

## 2017-12-22 NOTE — PROGRESS NOTES
Right breast atypical ductal hyperplasia      Ms. Samuel Dias is a 56y.o.-year-old woman who is now s/p right excisional breast biopsy for atypical ductal hyperplasia. She underwent this procedure on 2017 and tolerated it well. She is doing quite well postoperatively and her pain is continuing to improve. INTERVAL HISTORY:  On 2017 she underwent a right breast excisional biopsy, 2 site. The anterior medial biopsy site contained procedure changes but no sign of atypia or malignancy. The posterior lateral biopsy included atypical ductal hyperplasia with no sign of malignancy. [id number]. Pathology:  Department of Pathology  FINAL SURGICAL PATHOLOGY REPORT  Patient Name:  Benita Pittman           Accession No:  [id number]   Age Sex:   1970    56 Y / F       Location:      [facility]  Account No:    [de-identified]                 Collected:     2017  Med Rec No:    [id number]                 Received:      2017  Attend Phys:   Kayce Hallman             Completed:     12/15/2017  Perform Phys:  Cassius MAY            FINAL DIAGNOSIS:       A. Breast tissue, right, anterior/medial lesion, excision:       - Breast tissue with patchy chronic inflammation and mild         non-proliferative fibrocystic changes. - Negative for atypia and malignancy. B. Breast tissue, right, posterior/lateral, excision       - Focal atypical ductal hyperplasia in a background of breast tissue         with mild fibrocystic changes - see comment.       - Negative for malignancy. COMMENT: Specimen B contains a focal intraductal cellular proliferation  with calcifications, similar to that identified in the previous biopsy  specimens (SFS- and S-). CK5/6 immunohistochemical  staining with appropriate controls was performed on block B11 and is  essentially negative in the cells, supporting the diagnosis of atypia.   Definitive diagnostic features of DCIS are not

## 2017-12-22 NOTE — PATIENT INSTRUCTIONS
Patient Education        Breast Self-Exam: Care Instructions  Your Care Instructions    A breast self-exam is when you check your breasts for lumps or changes. This regular exam helps you learn how your breasts normally look and feel. Most breast problems or changes are not because of cancer. Breast self-exam is not a substitute for a mammogram. Having regular breast exams by your doctor and regular mammograms improve your chances of finding any problems with your breasts. Some women set a time each month to do a step-by-step breast self-exam. Other women like a less formal system. They might look at their breasts as they brush their teeth, or feel their breasts once in a while in the shower. If you notice a change in your breast, tell your doctor. Follow-up care is a key part of your treatment and safety. Be sure to make and go to all appointments, and call your doctor if you are having problems. It's also a good idea to know your test results and keep a list of the medicines you take. How do you do a breast self-exam?  · The best time to examine your breasts is usually one week after your menstrual period begins. Your breasts should not be tender then. If you do not have periods, you might do your exam on a day of the month that is easy to remember. · To examine your breasts:  ¨ Remove all your clothes above the waist and lie down. When you are lying down, your breast tissue spreads evenly over your chest wall, which makes it easier to feel all your breast tissue. ¨ Use the pads-not the fingertips-of the 3 middle fingers of your left hand to check your right breast. Move your fingers slowly in small coin-sized circles that overlap. ¨ Use three levels of pressure to feel of all your breast tissue. Use light pressure to feel the tissue close to the skin surface. Use medium pressure to feel a little deeper. Use firm pressure to feel your tissue close to your breastbone and ribs.  Use each pressure level to feel your breast tissue before moving on to the next spot. ¨ Check your entire breast, moving up and down as if following a strip from the collarbone to the bra line, and from the armpit to the ribs. Repeat until you have covered the entire breast.  ¨ Repeat this procedure for your left breast, using the pads of the 3 middle fingers of your right hand. · To examine your breasts while in the shower:  ¨ Place one arm over your head and lightly soap your breast on that side. ¨ Using the pads of your fingers, gently move your hand over your breast (in the strip pattern described above), feeling carefully for any lumps or changes. ¨ Repeat for the other breast.  · Have your doctor inspect anything you notice to see if you need further testing. Where can you learn more? Go to https://ConjuGonpemicaheb.Everbridge. org and sign in to your [employer]thing account. Enter P148 in the [employer] box to learn more about \"Breast Self-Exam: Care Instructions. \"     If you do not have an account, please click on the \"Sign Up Now\" link. Current as of: May 12, 2017  Content Version: 11.4  © 8509-4424 Healthwise, Incorporated. Care instructions adapted under license by [facility] ([facility]). If you have questions about a medical condition or this instruction, always ask your healthcare professional. [address] any warranty or liability for your use of this information.

## 2018-03-23 ENCOUNTER — HOSPITAL ENCOUNTER (OUTPATIENT)
Dept: CT IMAGING | Age: 48
Discharge: HOME | End: 2018-03-23
Attending: NURSE PRACTITIONER | Admitting: NURSE PRACTITIONER

## 2018-03-23 DIAGNOSIS — R10.2 PELVIC AND PERINEAL PAIN: ICD-10-CM

## 2018-03-23 DIAGNOSIS — R10.30 INGUINAL PAIN, UNSPECIFIED LATERALITY: ICD-10-CM

## 2018-03-23 DIAGNOSIS — M54.5 LOW BACK PAIN, UNSPECIFIED BACK PAIN LATERALITY, UNSPECIFIED CHRONICITY, WITH SCIATICA PRESENCE UNSPECIFIED: ICD-10-CM

## 2018-03-23 DIAGNOSIS — R10.2 ADNEXAL TENDERNESS, RIGHT: ICD-10-CM

## 2018-03-23 LAB
BUN BLDV-MCNC: 17 MG/DL (ref 7–20)
CREAT SERPL-MCNC: 0.8 MG/DL (ref 0.6–1.1)
GFR AFRICAN AMERICAN: >60
GFR NON-AFRICAN AMERICAN: >60

## 2018-04-02 ENCOUNTER — OFFICE VISIT (OUTPATIENT)
Dept: FAMILY MEDICINE CLINIC | Age: 48
End: 2018-04-02

## 2018-04-02 VITALS — DIASTOLIC BLOOD PRESSURE: 72 MMHG | HEART RATE: 71 BPM | SYSTOLIC BLOOD PRESSURE: 116 MMHG | OXYGEN SATURATION: 99 %

## 2018-04-02 DIAGNOSIS — R10.9 LEFT FLANK PAIN: Primary | ICD-10-CM

## 2018-04-02 PROCEDURE — 99213 OFFICE O/P EST LOW 20 MIN: CPT | Performed by: FAMILY MEDICINE

## 2018-04-02 ASSESSMENT — ENCOUNTER SYMPTOMS: BLOOD IN STOOL: 0

## 2018-05-07 ENCOUNTER — HOSPITAL ENCOUNTER (OUTPATIENT)
Dept: MAMMOGRAPHY | Age: 48
Discharge: HOME | End: 2018-05-07
Attending: SURGERY | Admitting: SURGERY

## 2018-05-07 ENCOUNTER — OFFICE VISIT (OUTPATIENT)
Dept: SURGERY | Age: 48
End: 2018-05-07

## 2018-05-07 VITALS
HEIGHT: 69 IN | TEMPERATURE: 96.9 F | SYSTOLIC BLOOD PRESSURE: 112 MMHG | DIASTOLIC BLOOD PRESSURE: 65 MMHG | WEIGHT: 150 LBS | BODY MASS INDEX: 22.22 KG/M2 | HEART RATE: 58 BPM

## 2018-05-07 DIAGNOSIS — N60.99 BENIGN MAMMARY DYSPLASIA OF BREAST: ICD-10-CM

## 2018-05-07 DIAGNOSIS — Z98.890 HISTORY OF BENIGN BREAST BIOPSY: ICD-10-CM

## 2018-05-07 DIAGNOSIS — N60.91 ATYPICAL DUCTAL HYPERPLASIA OF RIGHT BREAST: ICD-10-CM

## 2018-05-07 DIAGNOSIS — Z91.89 AT HIGH RISK FOR BREAST CANCER: Primary | ICD-10-CM

## 2018-05-07 PROCEDURE — 99213 OFFICE O/P EST LOW 20 MIN: CPT | Performed by: SURGERY

## 2018-05-07 RX ORDER — OMEPRAZOLE 20 MG/1
20 CAPSULE, DELAYED RELEASE ORAL DAILY
COMMUNITY
End: 2020-11-09

## 2018-06-27 DIAGNOSIS — I10 ESSENTIAL HYPERTENSION: ICD-10-CM

## 2018-06-27 RX ORDER — METOPROLOL SUCCINATE 25 MG/1
TABLET, EXTENDED RELEASE ORAL
Qty: 90 TABLET | Refills: 0 | Status: SHIPPED | OUTPATIENT
Start: 2018-06-27 | End: 2018-09-24 | Stop reason: SDUPTHER

## 2018-06-28 DIAGNOSIS — I10 ESSENTIAL HYPERTENSION: ICD-10-CM

## 2018-06-28 RX ORDER — METOPROLOL SUCCINATE 25 MG/1
TABLET, EXTENDED RELEASE ORAL
Qty: 90 TABLET | Refills: 0 | OUTPATIENT
Start: 2018-06-28

## 2018-09-24 DIAGNOSIS — I10 ESSENTIAL HYPERTENSION: ICD-10-CM

## 2018-09-24 RX ORDER — METOPROLOL SUCCINATE 25 MG/1
TABLET, EXTENDED RELEASE ORAL
Qty: 90 TABLET | Refills: 0 | Status: SHIPPED | OUTPATIENT
Start: 2018-09-24 | End: 2018-12-24 | Stop reason: SDUPTHER

## 2018-11-16 DIAGNOSIS — I10 ESSENTIAL HYPERTENSION: Primary | ICD-10-CM

## 2018-11-21 ENCOUNTER — HOSPITAL ENCOUNTER (OUTPATIENT)
Dept: WOMENS IMAGING | Age: 48
Discharge: HOME | End: 2018-11-21
Payer: OTHER GOVERNMENT

## 2018-11-21 DIAGNOSIS — Z91.89 AT HIGH RISK FOR BREAST CANCER: ICD-10-CM

## 2018-11-21 PROCEDURE — G0279 TOMOSYNTHESIS, MAMMO: HCPCS

## 2018-11-28 ENCOUNTER — TELEPHONE (OUTPATIENT)
Dept: FAMILY MEDICINE CLINIC | Age: 48
End: 2018-11-28

## 2018-11-29 DIAGNOSIS — F40.298 FEAR OF TESTS: Primary | ICD-10-CM

## 2018-11-29 RX ORDER — DIAZEPAM 10 MG/1
TABLET ORAL
Qty: 1 TABLET | Refills: 0 | Status: SHIPPED | OUTPATIENT
Start: 2018-11-29 | End: 2021-08-10 | Stop reason: SDUPTHER

## 2018-12-06 ENCOUNTER — OFFICE VISIT (OUTPATIENT)
Dept: SURGERY | Age: 48
End: 2018-12-06
Payer: OTHER GOVERNMENT

## 2018-12-06 VITALS — SYSTOLIC BLOOD PRESSURE: 110 MMHG | HEART RATE: 59 BPM | DIASTOLIC BLOOD PRESSURE: 60 MMHG

## 2018-12-06 DIAGNOSIS — Z12.31 SCREENING MAMMOGRAM, ENCOUNTER FOR: ICD-10-CM

## 2018-12-06 DIAGNOSIS — Z91.89 AT HIGH RISK FOR BREAST CANCER: Primary | ICD-10-CM

## 2018-12-06 PROCEDURE — 99213 OFFICE O/P EST LOW 20 MIN: CPT | Performed by: SURGERY

## 2018-12-10 ENCOUNTER — HOSPITAL ENCOUNTER (OUTPATIENT)
Dept: MRI IMAGING | Age: 48
Discharge: HOME | End: 2018-12-10
Payer: OTHER GOVERNMENT

## 2018-12-10 DIAGNOSIS — Z91.89 AT HIGH RISK FOR BREAST CANCER: ICD-10-CM

## 2018-12-10 PROCEDURE — 6360000004 HC RX CONTRAST MEDICATION: Performed by: SURGERY

## 2018-12-10 PROCEDURE — C8908 MRI W/O FOL W/CONT, BREAST,: HCPCS

## 2018-12-10 PROCEDURE — A9579 GAD-BASE MR CONTRAST NOS,1ML: HCPCS | Performed by: SURGERY

## 2018-12-10 RX ADMIN — GADOTERIDOL 13 ML: 279.3 INJECTION, SOLUTION INTRAVENOUS at 12:53

## 2018-12-11 ENCOUNTER — TELEPHONE (OUTPATIENT)
Dept: SURGERY | Age: 48
End: 2018-12-11

## 2018-12-24 DIAGNOSIS — I10 ESSENTIAL HYPERTENSION: ICD-10-CM

## 2018-12-24 RX ORDER — METOPROLOL SUCCINATE 25 MG/1
TABLET, EXTENDED RELEASE ORAL
Qty: 90 TABLET | Refills: 0 | Status: SHIPPED | OUTPATIENT
Start: 2018-12-24 | End: 2019-03-23 | Stop reason: SDUPTHER

## 2019-03-23 DIAGNOSIS — I10 ESSENTIAL HYPERTENSION: ICD-10-CM

## 2019-03-25 RX ORDER — METOPROLOL SUCCINATE 25 MG/1
25 TABLET, EXTENDED RELEASE ORAL DAILY
Qty: 30 TABLET | Refills: 0 | Status: SHIPPED | OUTPATIENT
Start: 2019-03-25 | End: 2019-04-16 | Stop reason: SDUPTHER

## 2019-04-16 ENCOUNTER — OFFICE VISIT (OUTPATIENT)
Dept: FAMILY MEDICINE CLINIC | Age: 49
End: 2019-04-16
Payer: OTHER GOVERNMENT

## 2019-04-16 VITALS
HEIGHT: 69 IN | OXYGEN SATURATION: 99 % | RESPIRATION RATE: 16 BRPM | BODY MASS INDEX: 22.48 KG/M2 | SYSTOLIC BLOOD PRESSURE: 124 MMHG | HEART RATE: 70 BPM | DIASTOLIC BLOOD PRESSURE: 78 MMHG

## 2019-04-16 DIAGNOSIS — I10 ESSENTIAL HYPERTENSION: ICD-10-CM

## 2019-04-16 DIAGNOSIS — R10.32 LLQ ABDOMINAL PAIN: ICD-10-CM

## 2019-04-16 LAB
A/G RATIO: 1.4 (ref 1.1–2.2)
ALBUMIN SERPL-MCNC: 4.2 G/DL (ref 3.4–5)
ALP BLD-CCNC: 38 U/L (ref 40–129)
ALT SERPL-CCNC: 15 U/L (ref 10–40)
ANION GAP SERPL CALCULATED.3IONS-SCNC: 11 MMOL/L (ref 3–16)
AST SERPL-CCNC: 17 U/L (ref 15–37)
BASOPHILS ABSOLUTE: 0 K/UL (ref 0–0.2)
BASOPHILS RELATIVE PERCENT: 0.4 %
BILIRUB SERPL-MCNC: <0.2 MG/DL (ref 0–1)
BILIRUBIN, POC: NORMAL
BLOOD URINE, POC: NORMAL
BUN BLDV-MCNC: 15 MG/DL (ref 7–20)
CALCIUM SERPL-MCNC: 9.2 MG/DL (ref 8.3–10.6)
CHLORIDE BLD-SCNC: 108 MMOL/L (ref 99–110)
CLARITY, POC: CLEAR
CO2: 22 MMOL/L (ref 21–32)
COLOR, POC: YELLOW
CREAT SERPL-MCNC: 0.9 MG/DL (ref 0.6–1.1)
EOSINOPHILS ABSOLUTE: 0 K/UL (ref 0–0.6)
EOSINOPHILS RELATIVE PERCENT: 0.6 %
GFR AFRICAN AMERICAN: >60
GFR NON-AFRICAN AMERICAN: >60
GLOBULIN: 3 G/DL
GLUCOSE BLD-MCNC: 96 MG/DL (ref 70–99)
GLUCOSE URINE, POC: NORMAL
HCT VFR BLD CALC: 39.6 % (ref 36–48)
HEMOGLOBIN: 13.7 G/DL (ref 12–16)
KETONES, POC: NORMAL
LEUKOCYTE EST, POC: NORMAL
LYMPHOCYTES ABSOLUTE: 2.6 K/UL (ref 1–5.1)
LYMPHOCYTES RELATIVE PERCENT: 41.4 %
MCH RBC QN AUTO: 33 PG (ref 26–34)
MCHC RBC AUTO-ENTMCNC: 34.5 G/DL (ref 31–36)
MCV RBC AUTO: 95.5 FL (ref 80–100)
MONOCYTES ABSOLUTE: 0.5 K/UL (ref 0–1.3)
MONOCYTES RELATIVE PERCENT: 8 %
NEUTROPHILS ABSOLUTE: 3.2 K/UL (ref 1.7–7.7)
NEUTROPHILS RELATIVE PERCENT: 49.6 %
NITRITE, POC: NORMAL
PDW BLD-RTO: 13 % (ref 12.4–15.4)
PH, POC: 6
PLATELET # BLD: 297 K/UL (ref 135–450)
PMV BLD AUTO: 9.7 FL (ref 5–10.5)
POTASSIUM SERPL-SCNC: 4.3 MMOL/L (ref 3.5–5.1)
PROTEIN, POC: NORMAL
RBC # BLD: 4.15 M/UL (ref 4–5.2)
SODIUM BLD-SCNC: 141 MMOL/L (ref 136–145)
SPECIFIC GRAVITY, POC: 1.03
TOTAL PROTEIN: 7.2 G/DL (ref 6.4–8.2)
UROBILINOGEN, POC: 0.2
WBC # BLD: 6.4 K/UL (ref 4–11)

## 2019-04-16 PROCEDURE — 36415 COLL VENOUS BLD VENIPUNCTURE: CPT | Performed by: NURSE PRACTITIONER

## 2019-04-16 PROCEDURE — 99214 OFFICE O/P EST MOD 30 MIN: CPT | Performed by: NURSE PRACTITIONER

## 2019-04-16 PROCEDURE — 81002 URINALYSIS NONAUTO W/O SCOPE: CPT | Performed by: NURSE PRACTITIONER

## 2019-04-16 RX ORDER — METOPROLOL SUCCINATE 25 MG/1
25 TABLET, EXTENDED RELEASE ORAL DAILY
Qty: 30 TABLET | Refills: 5 | Status: SHIPPED | OUTPATIENT
Start: 2019-04-16 | End: 2019-10-26 | Stop reason: SDUPTHER

## 2019-04-16 ASSESSMENT — PATIENT HEALTH QUESTIONNAIRE - PHQ9
SUM OF ALL RESPONSES TO PHQ QUESTIONS 1-9: 0
2. FEELING DOWN, DEPRESSED OR HOPELESS: 0
SUM OF ALL RESPONSES TO PHQ QUESTIONS 1-9: 0
SUM OF ALL RESPONSES TO PHQ9 QUESTIONS 1 & 2: 0
1. LITTLE INTEREST OR PLEASURE IN DOING THINGS: 0

## 2019-04-16 NOTE — PROGRESS NOTES
Stephanie Duffy 51 y.o. female    Chief Complaint   Patient presents with    Hypertension    Abdominal Pain       HPI     Hypertension: Patient here for follow-up. She is exercising and is adherent to low salt diet. Blood pressure is well controlled at home. Cardiac symptoms noneCardiovascular risk factors: dyslipidemia and hypertension. Use of agents associated with hypertension: none. History of target organ damage: none. Runs for exercise- feels well. Abdominal Pain: Patient complains of abdominal pain. The pain is described as dull. Pain is located in the LLQ with radiation to the back. Onset was 1 year ago. Symptoms have been unchanged since. Aggravating factors: none. Alleviating factors: none. Associated symptoms: fatigue. . The patient denies anorexia, belching, constipation, diarrhea, dysuria, fever, headache, hematochezia, hematuria, melena, nausea and vomiting. Symptoms intermittent. Had gyn eval- ok. Then CT scan of abd was done a year ago. Needed to repeat CT scan in 3 months, has not done it yet. Provided Reason for Exam: LLQ pain that radiates to back x 2  months  Acuity: Acute  Type of Encounter: Initial  FINDINGS:  Lower Chest: Motion artifact degrades the lung bases.  No pleural effusions  noted.   Organs: Spleen appears normal.  Right adrenal gland is normal.  Hypodensity  seen in the left adrenal gland measuring 7 -8 mm, most likely adenoma or  hyperplasia.   No hydronephrosis on the left.  Tiny hypodense nodule seen in left kidney,  measuring 4 mm in size, likely cyst.  No hydronephrosis on the right.  Tiny hypodense nodule seen in the right  kidney, measuring 1.1 cm in size or less, likely cyst  Focal fat is suspected along the falciform  Gallbladder is partially contracted.  Tiny hypodense nodule seen in the right  hepatic lobe measuring 3 mm, too small to characterize, likely cyst or  hemangioma  No pancreatic calcification.  No peripancreatic fluid  GI/Bowel: Appendix is partially visualized and appears normal.  Colon is incompletely distended, accentuating its wall thickness.  Appendix  is normal in caliber.  A few colonic diverticula are seen  Pelvis: No free fluid in pelvis. Calcifications seen in the pelvis, likely phleboliths  Peritoneum/Retroperitoneum: No aortic aneurysm. There is subtle fat stranding seen in the retroperitoneum, surrounding the  aorta, with a few tiny retroperitoneal nodes seen  Bones/Soft Tissues: Spurring is seen in the spine      Impression  There is subtle fat stranding seen in the retroperitoneum surrounding the  aorta, with a few tiny retroperitoneal nodes.  This appearance may be normal  for the patient, or may be a very early reactive finding of inflammatory  change from an etiology such as retroperitoneal fibrosis.  Recommend 3 month  follow-up CT scan with IV contrast for further characterization- to document  stability    Pastmedical, surgical, family and social history were reviewed and updated with the patient. Current Outpatient Medications:     metoprolol succinate (TOPROL XL) 25 MG extended release tablet, Take 1 tablet by mouth daily TAKE 1 TABLET BY MOUTH EVERY DAY, Disp: 30 tablet, Rfl: 0    omeprazole (PRILOSEC) 20 MG delayed release capsule, Take 20 mg by mouth daily, Disp: , Rfl:     OMEGA 3-6-9 FATTY ACIDS PO, Take by mouth, Disp: , Rfl:     Multiple Vitamins-Minerals (THERAPEUTIC MULTIVITAMIN-MINERALS) tablet, Take 1 tablet by mouth daily, Disp: , Rfl:     vitamin B-12 (CYANOCOBALAMIN) 100 MCG tablet, Take 50 mcg by mouth daily, Disp: , Rfl:     loratadine (CLARITIN) 10 MG tablet, Take 10 mg by mouth daily, Disp: , Rfl:     LEVORA 0.15/30, 28, 0.15-30 MG-MCG per tablet, , Disp: , Rfl:     Review of Systems   Constitutional: Positive for fatigue. Negative for chills, fever and unexpected weight change. Respiratory: Negative. Cardiovascular: Negative. Gastrointestinal: Positive for abdominal pain.  Negative for blood in stool, constipation, diarrhea, nausea and vomiting. Genitourinary: Negative for dysuria, hematuria, vaginal bleeding and vaginal discharge. Neurological: Negative for dizziness and headaches. Physical Exam   Constitutional: She is oriented to person, place, and time. She appears well-developed and well-nourished. No distress. Eyes: No scleral icterus. Neck: Neck supple. No thyromegaly present. Cardiovascular: Normal rate, regular rhythm and normal heart sounds. No murmur heard. No edema in LE's   Pulmonary/Chest: Effort normal and breath sounds normal.   Abdominal: There is tenderness in the left lower quadrant. There is no rigidity, no rebound and no guarding. Lymphadenopathy:     She has no cervical adenopathy. Neurological: She is alert and oriented to person, place, and time. Psychiatric: She has a normal mood and affect. Her behavior is normal.   Nursing note and vitals reviewed. Vitals:    04/16/19 1619   BP: 124/78   Pulse: 70   Resp: 16   SpO2: 99%       Assessment    1. Essential hypertension    2. LLQ abdominal pain        Plan    Marcia Escobedo was seen today for hypertension and abdominal pain. Diagnoses and all orders for this visit:    Essential hypertension  -     CBC Auto Differential  -     COMPREHENSIVE METABOLIC PANEL  -     TSH without Reflex  -     T4, FREE  -     metoprolol succinate (TOPROL XL) 25 MG extended release tablet;  Take 1 tablet by mouth daily TAKE 1 TABLET BY MOUTH EVERY DAY    LLQ abdominal pain  -     CT ABDOMEN PELVIS W IV CONTRAST Additional Contrast? Oral; Future  -     POCT Urinalysis no Micro        -     CBC, CHEM

## 2019-04-17 LAB
T4 FREE: 1.3 NG/DL (ref 0.9–1.8)
TSH SERPL DL<=0.05 MIU/L-ACNC: 2.57 UIU/ML (ref 0.27–4.2)

## 2019-04-17 ASSESSMENT — ENCOUNTER SYMPTOMS
VOMITING: 0
CONSTIPATION: 0
BLOOD IN STOOL: 0
RESPIRATORY NEGATIVE: 1
ABDOMINAL PAIN: 1
NAUSEA: 0
DIARRHEA: 0

## 2019-06-05 ENCOUNTER — TELEPHONE (OUTPATIENT)
Dept: FAMILY MEDICINE CLINIC | Age: 49
End: 2019-06-05

## 2019-06-05 DIAGNOSIS — Z01.89 PATIENT REQUESTED DIAGNOSTIC TESTING: Primary | ICD-10-CM

## 2019-06-05 NOTE — TELEPHONE ENCOUNTER
Orders placed for blood work. Please let pt know. She can obtain asap. Thanks. Spoke with yanira.  Reminded her that Telly needed to have labs and complete echo done, which were both discussed at Telly's last appt with B[facility].  Mom said she had not realized those things were ordered.  She said she did not receive any info regarding those things at check out.  Scheduled Telly for echo and f/u appt same day since they drive from [place].  Also mailed lab orders with fasting instructions, and let yanira know those need to be done some time in the next week.  Yanira verbalized understanding of all above info.

## 2019-06-05 NOTE — TELEPHONE ENCOUNTER
[facility] called and stated that pt needs creatnine or bmp ordered before she can have contrast for CT. Please order.  Pt is scheduled tomorrow for CT

## 2019-06-06 ENCOUNTER — HOSPITAL ENCOUNTER (OUTPATIENT)
Dept: CT IMAGING | Age: 49
Discharge: HOME | End: 2019-06-06
Payer: OTHER GOVERNMENT

## 2019-06-06 ENCOUNTER — HOSPITAL ENCOUNTER (OUTPATIENT)
Age: 49
Discharge: HOME | End: 2019-06-06
Payer: OTHER GOVERNMENT

## 2019-06-06 DIAGNOSIS — R10.32 LLQ ABDOMINAL PAIN: ICD-10-CM

## 2019-06-06 DIAGNOSIS — Z01.89 PATIENT REQUESTED DIAGNOSTIC TESTING: ICD-10-CM

## 2019-06-06 LAB
ALBUMIN SERPL-MCNC: 4.4 G/DL (ref 3.4–5)
ANION GAP SERPL CALCULATED.3IONS-SCNC: 9 MMOL/L (ref 3–16)
BUN BLDV-MCNC: 18 MG/DL (ref 7–20)
CALCIUM SERPL-MCNC: 10 MG/DL (ref 8.3–10.6)
CHLORIDE BLD-SCNC: 104 MMOL/L (ref 99–110)
CO2: 25 MMOL/L (ref 21–32)
CREAT SERPL-MCNC: 0.9 MG/DL (ref 0.6–1.1)
GFR AFRICAN AMERICAN: >60
GFR NON-AFRICAN AMERICAN: >60
GLUCOSE BLD-MCNC: 86 MG/DL (ref 70–99)
PHOSPHORUS: 3.2 MG/DL (ref 2.5–4.9)
POTASSIUM SERPL-SCNC: 4.1 MMOL/L (ref 3.5–5.1)
SODIUM BLD-SCNC: 138 MMOL/L (ref 136–145)

## 2019-06-06 PROCEDURE — 36415 COLL VENOUS BLD VENIPUNCTURE: CPT

## 2019-06-06 PROCEDURE — 80069 RENAL FUNCTION PANEL: CPT

## 2019-06-06 PROCEDURE — 74177 CT ABD & PELVIS W/CONTRAST: CPT

## 2019-06-06 PROCEDURE — 6360000004 HC RX CONTRAST MEDICATION: Performed by: NURSE PRACTITIONER

## 2019-06-06 RX ADMIN — IOHEXOL 50 ML: 240 INJECTION, SOLUTION INTRATHECAL; INTRAVASCULAR; INTRAVENOUS; ORAL at 14:14

## 2019-06-06 RX ADMIN — IOPAMIDOL 75 ML: 755 INJECTION, SOLUTION INTRAVENOUS at 14:37

## 2019-06-14 DIAGNOSIS — R10.32 LLQ ABDOMINAL PAIN: ICD-10-CM

## 2019-06-14 DIAGNOSIS — R93.5 ABNORMAL ABDOMINAL CT SCAN: ICD-10-CM

## 2019-06-14 RX ORDER — POLYETHYLENE GLYCOL 3350 17 G/17G
17 POWDER, FOR SOLUTION ORAL DAILY PRN
Qty: 527 G | Refills: 0 | Status: SHIPPED | OUTPATIENT
Start: 2019-06-14 | End: 2019-07-14

## 2019-10-26 DIAGNOSIS — I10 ESSENTIAL HYPERTENSION: ICD-10-CM

## 2019-10-28 RX ORDER — METOPROLOL SUCCINATE 25 MG/1
TABLET, EXTENDED RELEASE ORAL
Qty: 30 TABLET | Refills: 5 | Status: SHIPPED | OUTPATIENT
Start: 2019-10-28 | End: 2020-04-30 | Stop reason: SDUPTHER

## 2020-01-07 ENCOUNTER — HOSPITAL ENCOUNTER (OUTPATIENT)
Dept: WOMENS IMAGING | Age: 50
Discharge: HOME | End: 2020-01-07
Payer: OTHER GOVERNMENT

## 2020-01-07 PROCEDURE — 77063 BREAST TOMOSYNTHESIS BI: CPT

## 2020-04-23 ENCOUNTER — TELEPHONE (OUTPATIENT)
Dept: FAMILY MEDICINE CLINIC | Age: 50
End: 2020-04-23

## 2020-04-30 ENCOUNTER — OFFICE VISIT (OUTPATIENT)
Dept: FAMILY MEDICINE CLINIC | Age: 50
End: 2020-04-30
Payer: OTHER GOVERNMENT

## 2020-04-30 VITALS
HEART RATE: 62 BPM | HEIGHT: 69 IN | RESPIRATION RATE: 16 BRPM | OXYGEN SATURATION: 98 % | TEMPERATURE: 98.3 F | SYSTOLIC BLOOD PRESSURE: 118 MMHG | DIASTOLIC BLOOD PRESSURE: 70 MMHG | BODY MASS INDEX: 22.48 KG/M2

## 2020-04-30 PROCEDURE — 99214 OFFICE O/P EST MOD 30 MIN: CPT | Performed by: NURSE PRACTITIONER

## 2020-04-30 RX ORDER — METOPROLOL SUCCINATE 25 MG/1
TABLET, EXTENDED RELEASE ORAL
Qty: 30 TABLET | Refills: 5 | Status: SHIPPED | OUTPATIENT
Start: 2020-04-30 | End: 2020-11-03

## 2020-04-30 RX ORDER — NORETHINDRONE ACETATE AND ETHINYL ESTRADIOL, ETHINYL ESTRADIOL AND FERROUS FUMARATE 1MG-10(24)
KIT ORAL
COMMUNITY
Start: 2020-02-06 | End: 2021-04-20

## 2020-04-30 ASSESSMENT — ENCOUNTER SYMPTOMS
RESPIRATORY NEGATIVE: 1
GASTROINTESTINAL NEGATIVE: 1
EYES NEGATIVE: 1

## 2020-04-30 ASSESSMENT — PATIENT HEALTH QUESTIONNAIRE - PHQ9
SUM OF ALL RESPONSES TO PHQ9 QUESTIONS 1 & 2: 0
2. FEELING DOWN, DEPRESSED OR HOPELESS: 0
SUM OF ALL RESPONSES TO PHQ QUESTIONS 1-9: 0
SUM OF ALL RESPONSES TO PHQ QUESTIONS 1-9: 0
1. LITTLE INTEREST OR PLEASURE IN DOING THINGS: 0

## 2020-04-30 NOTE — PATIENT INSTRUCTIONS
healthcare professional. [address] any warranty or liability for your use of this information. Patient Education        Patient Education        Actinic Keratosis: Care Instructions  Your Care Instructions  Actinic keratosis is a skin growth caused by sun damage. It can turn into skin cancer, but this isn't common. Actinic keratoses, also called solar keratoses, are small red, brown, or skin-colored scaly patches. They are most common on the face, neck, hands, and forearms. Your doctor can remove these growths by freezing or scraping them off or by putting medicines on them. Follow-up care is a key part of your treatment and safety. Be sure to make and go to all appointments, and call your doctor if you are having problems. It's also a good idea to know your test results and keep a list of the medicines you take. How can you care for yourself at home? · If your doctor told you how to care for the treated area, follow your doctor's instructions. If you did not get instructions, follow this general advice:  ? Wash around the area with clean water 2 times a day. Don't use hydrogen peroxide or alcohol, which can slow healing. ? You may cover the area with a thin layer of petroleum jelly, such as Vaseline, and a nonstick bandage. ? Apply more petroleum jelly and replace the bandage as needed. To prevent actinic keratosis  · Always wear sunscreen on exposed skin. Make sure to use a broad-spectrum sunscreen that has a sun protection factor (SPF) of 30 or higher. Use it every day, even when it is cloudy. · Wear long sleeves, a hat, and pants if you are going to be outdoors for a long time. · Avoid the sun between 10 a.m. and 4 p.m., the peak time for UV rays. · Do not use tanning booths or sunlamps. When should you call for help?   Watch closely for changes in your health, and be sure to contact your doctor if:    · You have symptoms of infection, such as:  ? Increased pain, swelling, warmth, or redness. ? Red streaks leading from the area. ? Pus draining from the area. ? A fever. Where can you learn more? Go to https://The Film CopeHortaueweb.[employer]. org and sign in to your [employer] account. Enter L364 in the [facility] box to learn more about \"Actinic Keratosis: Care Instructions. \"     If you do not have an account, please click on the \"Sign Up Now\" link. Current as of: October 30, 2019Content Version: 12.4  © 1303-4998 Healthwise, Incorporated. Care instructions adapted under license by [facility] ([facility]). If you have questions about a medical condition or this instruction, always ask your healthcare professional. [address] any warranty or liability for your use of this information. Patient Education        Earwax Blockage: Care Instructions  Your Care Instructions    Earwax is a natural substance that protects the ear canal. Normally, earwax drains from the ears and does not cause problems. Sometimes earwax builds up and hardens. Earwax blockage (also called cerumen impaction) can cause some loss of hearing and pain. When wax is tightly packed, you will need to have your doctor remove it. Follow-up care is a key part of your treatment and safety. Be sure to make and go to all appointments, and call your doctor if you are having problems. It's also a good idea to know your test results and keep a list of the medicines you take. How can you care for yourself at home? · Do not try to remove earwax with cotton swabs, fingers, or other objects. This can make the blockage worse and damage the eardrum. · If your doctor recommends that you try to remove earwax at home:  ? Soften and loosen the earwax with warm mineral oil. You also can try hydrogen peroxide mixed with an equal amount of room temperature water. Place 2 drops of the fluid, warmed to body temperature, in the ear two times a day for up to 5 days.   ? Once the wax is loose and

## 2020-05-07 ENCOUNTER — PROCEDURE VISIT (OUTPATIENT)
Dept: FAMILY MEDICINE CLINIC | Age: 50
End: 2020-05-07
Payer: OTHER GOVERNMENT

## 2020-05-07 VITALS
TEMPERATURE: 98.2 F | DIASTOLIC BLOOD PRESSURE: 70 MMHG | BODY MASS INDEX: 22.48 KG/M2 | SYSTOLIC BLOOD PRESSURE: 112 MMHG | HEIGHT: 69 IN

## 2020-05-07 PROCEDURE — 11601 EXC TR-EXT MAL+MARG 0.6-1 CM: CPT | Performed by: FAMILY MEDICINE

## 2020-05-07 ASSESSMENT — ENCOUNTER SYMPTOMS
SHORTNESS OF BREATH: 0
NAUSEA: 0
COLOR CHANGE: 1

## 2020-05-07 NOTE — PATIENT INSTRUCTIONS
Basic Wound Care Instruction from Dr. Rashid Persons    Keep the area dry for 24-48 hours after your procedure. Do not submerge the wound in water, especially pools, hot tubs, rivers, lakes, or oceans. You can shower, however, keep the areas covered and away from direct water. Change the bandage after immediately if it's wet. Cleanse the wound twice daily with warm water and unfragranced soap (I.e. Orange Dial soap). Always wash your hands before you clean your wound. Pat the wound completely dry afterwards. Apply topical triple antibiotic ointment (or any other ointment I discussed with you during my visit) and a clean dressing with every wound cleaning (either gauze if the wound is draining a lot or a large bandaid). You can leave the wound open to the air once the area is covered with a protective scab. You can apply Vaseline nightly to assist with the healing process. If you are returning for suture removal, aim to see me in 8-9 days from your procedure. For pain, You can take over-the-counter Advil OR Motrin OR Ibuprofen 400-600 mg every 8 hours as needed, unless we talked about something specific during your visit, OR unless you have been told in the past not to take these medications. If this is the case, take 500 mg of Tylenol.

## 2020-05-07 NOTE — PROGRESS NOTES
Emotionally abused: Not on file     Physically abused: Not on file     Forced sexual activity: Not on file   Other Topics Concern    Not on file   Social History Narrative    Not on file       Family History   Problem Relation Age of Onset    No Known Problems Mother     Heart Disease Father 52        CAD, hx of CABG    Colon Cancer Brother     No Known Problems Brother     Diabetes Paternal Aunt     Diabetes Paternal Uncle        Current Outpatient Medications   Medication Sig Dispense Refill    LO LOESTRIN FE 1 MG-10 MCG / 10 MCG tablet TK 1 T PO QD      metoprolol succinate (TOPROL XL) 25 MG extended release tablet TAKE 1 TABLET BY MOUTH EVERY DAY 30 tablet 5    omeprazole (PRILOSEC) 20 MG delayed release capsule Take 20 mg by mouth daily      OMEGA 3-6-9 FATTY ACIDS PO Take by mouth      Multiple Vitamins-Minerals (THERAPEUTIC MULTIVITAMIN-MINERALS) tablet Take 1 tablet by mouth daily      vitamin B-12 (CYANOCOBALAMIN) 100 MCG tablet Take 50 mcg by mouth daily      loratadine (CLARITIN) 10 MG tablet Take 10 mg by mouth daily      LEVORA 0.15/30, 28, 0.15-30 MG-MCG per tablet        No current facility-administered medications for this visit. There is no immunization history on file for this patient. Allergies   Allergen Reactions    Decongestant [Pseudoephedrine Hcl]      Nausea, increased heart rate    Norco [Hydrocodone-Acetaminophen] Hives       Review of Systems   Constitutional: Negative for activity change, fever and unexpected weight change. Respiratory: Negative for shortness of breath. Cardiovascular: Negative for chest pain. Gastrointestinal: Negative for nausea. Skin: Positive for color change. Negative for pallor, rash and wound. Allergic/Immunologic: Negative for immunocompromised state. Hematological: Negative for adenopathy.        /70 (Site: Right Upper Arm, Position: Sitting, Cuff Size: Medium Adult)   Temp 98.2 °F (36.8 °C)   Ht 5' 8.5\" (1.74 m)

## 2020-05-14 ENCOUNTER — PROCEDURE VISIT (OUTPATIENT)
Dept: FAMILY MEDICINE CLINIC | Age: 50
End: 2020-05-14

## 2020-05-14 VITALS — SYSTOLIC BLOOD PRESSURE: 122 MMHG | OXYGEN SATURATION: 98 % | HEART RATE: 73 BPM | DIASTOLIC BLOOD PRESSURE: 60 MMHG

## 2020-05-14 PROCEDURE — 99024 POSTOP FOLLOW-UP VISIT: CPT | Performed by: FAMILY MEDICINE

## 2020-05-14 NOTE — PATIENT INSTRUCTIONS
Patient Education        Learning About Basal Cell Skin Cancer  Your Care Instructions    Basal cell skin cancer (carcinoma) is a type of skin cancer. It most often appears on areas of the body that have been exposed to the sun. These areas include the head, face, neck, back, chest, or shoulders. The nose is the most common site. This cancer grows slowly and does not usually spread, or metastasize, to other parts of the body. It is almost always cured when it is found early and treated. This skin cancer is usually caused by too much sun. Using tanning beds or sunlamps can also cause it. What are the symptoms? Signs of basal cell carcinoma include:  · Any firm, pearly bump with tiny blood vessels that look spidery. · Any red, tender, flat spot that bleeds easily. · Any small, fleshy bump with a smooth, pearly appearance. It may have a sunken center. · Any smooth, shiny bump that may look like a mole or cyst.  · Any patch of skin, especially on the face, that looks like a scar and is firm to the touch. · Any bump that itches, bleeds, crusts over, and then repeats the cycle and has not healed in a few weeks. · Any change in the size, shape, or color of a mole or a skin growth. How is it treated? Your doctor will want to remove all of the cancer. There are several ways to remove it. It depends on how big it is, where it is on your body, and your age and overall health. Treatment options include:  · Surgery to cut out the cancer. · Mohs micrographic surgery. This surgery removes the skin cancer one layer at a time, checking each layer for cancer cells right after it is removed. · Curettage and electrosurgery. Curettage uses a spoon-shaped instrument (curette) to scrape off the skin cancer, and electrosurgery controls the bleeding and destroys any remaining cancer cells. · Cryosurgery. Cryosurgery destroys the skin cancer by freezing it with liquid nitrogen. · Radiation therapy.  Radiation therapy uses

## 2020-05-15 ENCOUNTER — PATIENT MESSAGE (OUTPATIENT)
Dept: FAMILY MEDICINE CLINIC | Age: 50
End: 2020-05-15

## 2020-05-15 ENCOUNTER — OFFICE VISIT (OUTPATIENT)
Dept: FAMILY MEDICINE CLINIC | Age: 50
End: 2020-05-15
Payer: OTHER GOVERNMENT

## 2020-05-15 VITALS
BODY MASS INDEX: 22.48 KG/M2 | OXYGEN SATURATION: 99 % | HEIGHT: 69 IN | DIASTOLIC BLOOD PRESSURE: 84 MMHG | SYSTOLIC BLOOD PRESSURE: 124 MMHG | HEART RATE: 69 BPM | TEMPERATURE: 97.6 F

## 2020-05-15 PROCEDURE — 99213 OFFICE O/P EST LOW 20 MIN: CPT | Performed by: PHYSICIAN ASSISTANT

## 2020-05-15 NOTE — TELEPHONE ENCOUNTER
Not sure who I should have sent this too since [facility] took the sutures out.  I sent to both providers

## 2020-05-18 ENCOUNTER — TELEPHONE (OUTPATIENT)
Dept: FAMILY MEDICINE CLINIC | Age: 50
End: 2020-05-18

## 2020-05-20 ENCOUNTER — TELEPHONE (OUTPATIENT)
Dept: ADMINISTRATIVE | Age: 50
End: 2020-05-20

## 2020-05-20 ASSESSMENT — ENCOUNTER SYMPTOMS
SHORTNESS OF BREATH: 0
COLOR CHANGE: 1
NAUSEA: 0

## 2020-05-20 NOTE — PROGRESS NOTES
for MRSA. - cephALEXin (KEFLEX) 500 MG capsule; Take 1 capsule by mouth 3 times daily for 7 days  Dispense: 21 capsule; Refill: 0        While assessing care for this patient, I have reviewed all pertinent lab work/imaging/ specialist notes and care in reference to those problems addressed above in detail. Appropriate medical decision making was based on this. Please note that portions of this note may have been completed with a voice recognition program. Efforts were made to edit the dictations but occasionally words are mis-transcribed. No follow-ups on file.

## 2020-05-21 ENCOUNTER — OFFICE VISIT (OUTPATIENT)
Dept: FAMILY MEDICINE CLINIC | Age: 50
End: 2020-05-21
Payer: OTHER GOVERNMENT

## 2020-05-21 VITALS — DIASTOLIC BLOOD PRESSURE: 78 MMHG | SYSTOLIC BLOOD PRESSURE: 110 MMHG | TEMPERATURE: 98 F

## 2020-05-21 PROCEDURE — 99213 OFFICE O/P EST LOW 20 MIN: CPT | Performed by: FAMILY MEDICINE

## 2020-05-21 RX ORDER — CEPHALEXIN 500 MG/1
500 CAPSULE ORAL 3 TIMES DAILY
Qty: 21 CAPSULE | Refills: 0 | Status: SHIPPED | OUTPATIENT
Start: 2020-05-21 | End: 2020-05-28

## 2020-05-21 NOTE — PATIENT INSTRUCTIONS
scrapes, or other injuries to your skin. Cellulitis most often occurs where there is a break in the skin. · If you get a scrape, cut, mild burn, or bite, wash the wound with clean water as soon as you can to help avoid infection. Don't use hydrogen peroxide or alcohol, which can slow healing. · If you have swelling in your legs (edema), support stockings and good skin care may help prevent leg sores and cellulitis. · Take care of your feet, especially if you have diabetes or other conditions that increase the risk of infection. Wear shoes and socks. Do not go barefoot. If you have athlete's foot or other skin problems on your feet, talk to your doctor about how to treat them. When should you call for help? Call your doctor now or seek immediate medical care if:    · You have signs that your infection is getting worse, such as:  ? Increased pain, swelling, warmth, or redness. ? Red streaks leading from the area. ? Pus draining from the area. ? A fever.     · You get a rash.    Watch closely for changes in your health, and be sure to contact your doctor if:    · You do not get better as expected. Where can you learn more? Go to https://FRESS.Seventymm. org and sign in to your [employer] account. Enter L688 in the [facility] box to learn more about \"Cellulitis: Care Instructions. \"     If you do not have an account, please click on the \"Sign Up Now\" link. Current as of: October 30, 2019Content Version: 12.4  © 6711-7263 Healthwise, Incorporated. Care instructions adapted under license by [facility] ([facility]). If you have questions about a medical condition or this instruction, always ask your healthcare professional. [address] any warranty or liability for your use of this information.

## 2020-11-02 ENCOUNTER — TELEPHONE (OUTPATIENT)
Dept: GASTROENTEROLOGY | Age: 50
End: 2020-11-02

## 2020-11-02 ENCOUNTER — TELEPHONE (OUTPATIENT)
Dept: FAMILY MEDICINE CLINIC | Age: 50
End: 2020-11-02

## 2020-11-03 RX ORDER — METOPROLOL SUCCINATE 25 MG/1
TABLET, EXTENDED RELEASE ORAL
Qty: 30 TABLET | Refills: 5 | Status: SHIPPED | OUTPATIENT
Start: 2020-11-03 | End: 2020-11-05 | Stop reason: SDUPTHER

## 2020-11-05 ENCOUNTER — OFFICE VISIT (OUTPATIENT)
Dept: FAMILY MEDICINE CLINIC | Age: 50
End: 2020-11-05
Payer: OTHER GOVERNMENT

## 2020-11-05 VITALS
HEART RATE: 68 BPM | RESPIRATION RATE: 16 BRPM | TEMPERATURE: 97.8 F | SYSTOLIC BLOOD PRESSURE: 105 MMHG | OXYGEN SATURATION: 98 % | DIASTOLIC BLOOD PRESSURE: 78 MMHG

## 2020-11-05 PROCEDURE — 99213 OFFICE O/P EST LOW 20 MIN: CPT | Performed by: NURSE PRACTITIONER

## 2020-11-05 RX ORDER — METOPROLOL SUCCINATE 25 MG/1
25 TABLET, EXTENDED RELEASE ORAL DAILY
Qty: 30 TABLET | Refills: 5 | Status: SHIPPED | OUTPATIENT
Start: 2020-11-05 | End: 2020-11-09

## 2020-11-05 SDOH — ECONOMIC STABILITY: TRANSPORTATION INSECURITY
IN THE PAST 12 MONTHS, HAS THE LACK OF TRANSPORTATION KEPT YOU FROM MEDICAL APPOINTMENTS OR FROM GETTING MEDICATIONS?: NO

## 2020-11-05 SDOH — HEALTH STABILITY: PHYSICAL HEALTH: ON AVERAGE, HOW MANY DAYS PER WEEK DO YOU ENGAGE IN MODERATE TO STRENUOUS EXERCISE (LIKE A BRISK WALK)?: 6 DAYS

## 2020-11-05 SDOH — HEALTH STABILITY: MENTAL HEALTH
STRESS IS WHEN SOMEONE FEELS TENSE, NERVOUS, ANXIOUS, OR CAN'T SLEEP AT NIGHT BECAUSE THEIR MIND IS TROUBLED. HOW STRESSED ARE YOU?: ONLY A LITTLE

## 2020-11-05 SDOH — ECONOMIC STABILITY: FOOD INSECURITY: WITHIN THE PAST 12 MONTHS, THE FOOD YOU BOUGHT JUST DIDN'T LAST AND YOU DIDN'T HAVE MONEY TO GET MORE.: NEVER TRUE

## 2020-11-05 SDOH — HEALTH STABILITY: PHYSICAL HEALTH: ON AVERAGE, HOW MANY MINUTES DO YOU ENGAGE IN EXERCISE AT THIS LEVEL?: 120 MIN

## 2020-11-05 SDOH — ECONOMIC STABILITY: INCOME INSECURITY: HOW HARD IS IT FOR YOU TO PAY FOR THE VERY BASICS LIKE FOOD, HOUSING, MEDICAL CARE, AND HEATING?: NOT VERY HARD

## 2020-11-05 SDOH — ECONOMIC STABILITY: TRANSPORTATION INSECURITY
IN THE PAST 12 MONTHS, HAS LACK OF TRANSPORTATION KEPT YOU FROM MEETINGS, WORK, OR FROM GETTING THINGS NEEDED FOR DAILY LIVING?: NO

## 2020-11-05 SDOH — ECONOMIC STABILITY: FOOD INSECURITY: WITHIN THE PAST 12 MONTHS, YOU WORRIED THAT YOUR FOOD WOULD RUN OUT BEFORE YOU GOT MONEY TO BUY MORE.: NEVER TRUE

## 2020-11-05 ASSESSMENT — PATIENT HEALTH QUESTIONNAIRE - PHQ9
SUM OF ALL RESPONSES TO PHQ QUESTIONS 1-9: 0
SUM OF ALL RESPONSES TO PHQ9 QUESTIONS 1 & 2: 0
SUM OF ALL RESPONSES TO PHQ QUESTIONS 1-9: 0
2. FEELING DOWN, DEPRESSED OR HOPELESS: 0
SUM OF ALL RESPONSES TO PHQ QUESTIONS 1-9: 0
1. LITTLE INTEREST OR PLEASURE IN DOING THINGS: 0

## 2020-11-05 ASSESSMENT — ENCOUNTER SYMPTOMS
EYES NEGATIVE: 1
RESPIRATORY NEGATIVE: 1
GASTROINTESTINAL NEGATIVE: 1

## 2020-11-05 NOTE — PATIENT INSTRUCTIONS
ibuprofen. Some of these medicines can raise blood pressure. · Learn how to check your blood pressure at home. Lifestyle changes  · Stay at a healthy weight. This is especially important if you put on weight around the waist. Losing even 10 pounds can help you lower your blood pressure. · If your doctor recommends it, get more exercise. Walking is a good choice. Bit by bit, increase the amount you walk every day. Try for at least 30 minutes on most days of the week. You also may want to swim, bike, or do other activities. · Avoid or limit alcohol. Talk to your doctor about whether you can drink any alcohol. · Try to limit how much sodium you eat to less than 2,300 milligrams (mg) a day. Your doctor may ask you to try to eat less than 1,500 mg a day. · Eat plenty of fruits (such as bananas and oranges), vegetables, legumes, whole grains, and low-fat dairy products. · Lower the amount of saturated fat in your diet. Saturated fat is found in animal products such as milk, cheese, and meat. Limiting these foods may help you lose weight and also lower your risk for heart disease. · Do not smoke. Smoking increases your risk for heart attack and stroke. If you need help quitting, talk to your doctor about stop-smoking programs and medicines. These can increase your chances of quitting for good. When should you call for help? Call  911 anytime you think you may need emergency care. This may mean having symptoms that suggest that your blood pressure is causing a serious heart or blood vessel problem. Your blood pressure may be over 180/120. For example, call 911 if:    · You have symptoms of a heart attack. These may include:  ? Chest pain or pressure, or a strange feeling in the chest.  ? Sweating. ? Shortness of breath. ? Nausea or vomiting. ? Pain, pressure, or a strange feeling in the back, neck, jaw, or upper belly or in one or both shoulders or arms. ? Lightheadedness or sudden weakness.   ? A fast or irregular heartbeat.     · You have symptoms of a stroke. These may include:  ? Sudden numbness, tingling, weakness, or loss of movement in your face, arm, or leg, especially on only one side of your body. ? Sudden vision changes. ? Sudden trouble speaking. ? Sudden confusion or trouble understanding simple statements. ? Sudden problems with walking or balance. ? A sudden, severe headache that is different from past headaches.     · You have severe back or belly pain. Do not wait until your blood pressure comes down on its own. Get help right away. Call your doctor now or seek immediate care if:    · Your blood pressure is much higher than normal (such as 180/120 or higher), but you don't have symptoms.     · You think high blood pressure is causing symptoms, such as:  ? Severe headache.  ? Blurry vision. Watch closely for changes in your health, and be sure to contact your doctor if:    · Your blood pressure measures higher than your doctor recommends at least 2 times. That means the top number is higher or the bottom number is higher, or both.     · You think you may be having side effects from your blood pressure medicine. Where can you learn more? Go to https://hdtMEDIApepiceweb.Truzip. org and sign in to your [employer] account. Enter Q802 in the [employer] box to learn more about \"High Blood Pressure: Care Instructions. \"     If you do not have an account, please click on the \"Sign Up Now\" link. Current as of: December 16, 2019               Content Version: 12.6  © 8822-2605 [employer]. Care instructions adapted under license by [facility] [employer] [facility] ([facility]). If you have questions about a medical condition or this instruction, always ask your healthcare professional. [place] [address] any warranty or liability for your use of this information.

## 2020-11-05 NOTE — PROGRESS NOTES
2020    Prema Harry (:  1970) is a 53 y.o. female, here for a preventive medicine evaluation. Patient Active Problem List   Diagnosis    Essential hypertension    Right upper quadrant abdominal pain    Abnormal mammogram     Pt is here today for a 6 month f/u for HTN. Taking metoprolol 25 mg daily as directed. BP stable. Review of Systems   Constitutional: Negative. HENT: Negative. Eyes: Negative. Respiratory: Negative. Cardiovascular: Negative. Gastrointestinal: Negative. Genitourinary: Negative. Musculoskeletal: Negative. Skin: Negative. Neurological: Negative. Psychiatric/Behavioral: Negative. Prior to Visit Medications    Medication Sig Taking?  Authorizing Provider   metoprolol succinate (TOPROL XL) 25 MG extended release tablet Take 1 tablet by mouth daily Yes JOB De Paz - CNP   LO LOESTRIN FE 1 MG-10 MCG / 10 MCG tablet TK 1 T PO QD Yes Historical Provider, MD   OMEGA 3-6-9 FATTY ACIDS PO Take by mouth Yes Historical Provider, MD   Multiple Vitamins-Minerals (THERAPEUTIC MULTIVITAMIN-MINERALS) tablet Take 1 tablet by mouth daily Yes Historical Provider, MD   vitamin B-12 (CYANOCOBALAMIN) 100 MCG tablet Take 50 mcg by mouth daily Yes Historical Provider, MD   loratadine (CLARITIN) 10 MG tablet Take 10 mg by mouth daily Yes Historical Provider, MD   omeprazole (PRILOSEC) 20 MG delayed release capsule Take 20 mg by mouth daily  Historical Provider, MD Brennon Paniagua 0.15/30, 28, 0.15-30 MG-MCG per tablet   Historical Provider, MD        Allergies   Allergen Reactions    Decongestant [Pseudoephedrine Hcl]      Nausea, increased heart rate    Norco [Hydrocodone-Acetaminophen] Hives       Past Medical History:   Diagnosis Date    GERD (gastroesophageal reflux disease)     Hypertension     PONV (postoperative nausea and vomiting)     Seasonal allergies        Past Surgical History:   Procedure Laterality Date    BREAST BIOPSY Right 12/13/2017     TWO SITE RIGHT NEEDLE LOCALIZED EXCISIONAL BREAST BIOPSY    WISDOM TOOTH EXTRACTION         Social History     Socioeconomic History    Marital status:      Spouse name: Not on file    Number of children: 1    Years of education: Not on file    Highest education level: Not on file   Occupational History    Not on file   Social Needs    Financial resource strain: Not very hard    Food insecurity     Worry: Never true     Inability: Never true    Transportation needs     Medical: No     Non-medical: No   Tobacco Use    Smoking status: Never Smoker    Smokeless tobacco: Never Used   Substance and Sexual Activity    Alcohol use: No    Drug use: No    Sexual activity: Yes     Partners: Male     Birth control/protection: Pill   Lifestyle    Physical activity     Days per week: 6 days     Minutes per session: 120 min    Stress:  Only a little   Relationships    Social connections     Talks on phone: Not on file     Gets together: Not on file     Attends Restoration service: Not on file     Active member of club or organization: Not on file     Attends meetings of clubs or organizations: Not on file     Relationship status: Not on file    Intimate partner violence     Fear of current or ex partner: Not on file     Emotionally abused: Not on file     Physically abused: Not on file     Forced sexual activity: Not on file   Other Topics Concern    Not on file   Social History Narrative    Not on file        Family History   Problem Relation Age of Onset    No Known Problems Mother     Heart Disease Father 52        CAD, hx of CABG    Colon Cancer Brother     No Known Problems Brother     Diabetes Paternal Aunt     Diabetes Paternal Uncle        ADVANCE DIRECTIVE: N, <no information>    Vitals:    11/05/20 1048   BP: 105/78   Site: Right Upper Arm   Position: Sitting   Cuff Size: Large Adult   Pulse: 68   Resp: 16   Temp: 97.8 °F (36.6 °C)   TempSrc: Infrared   SpO2: 98%   Weight: Comment: pt refused     Estimated body mass index is 22.48 kg/m² as calculated from the following:    Height as of 5/15/20: 5' 8.5\" (1.74 m). Weight as of 5/7/18: 150 lb (68 kg). Physical Exam  Vitals signs reviewed. Constitutional:       Appearance: Normal appearance. She is normal weight. HENT:      Head: Normocephalic. Neck:      Musculoskeletal: Normal range of motion. Cardiovascular:      Rate and Rhythm: Normal rate and regular rhythm. Heart sounds: Normal heart sounds. Pulmonary:      Effort: Pulmonary effort is normal.      Breath sounds: Normal breath sounds. Skin:     General: Skin is warm and dry. Neurological:      Mental Status: She is alert and oriented to person, place, and time. Psychiatric:         Mood and Affect: Mood normal.         Behavior: Behavior normal.         Thought Content: Thought content normal.         Judgment: Judgment normal.         No flowsheet data found. Lab Results   Component Value Date    GLUCOSE 86 06/06/2019       The ASCVD Risk score (Flor Braga, et al., 2013) failed to calculate for the following reasons:    Cannot find a previous HDL lab    Cannot find a previous total cholesterol lab      There is no immunization history on file for this patient. Health Maintenance   Topic Date Due    HIV screen  03/31/1986    Cervical cancer screen  03/31/1992    Lipid screen  03/31/2011    Potassium monitoring  06/06/2020    Creatinine monitoring  06/06/2020    Flu vaccine (1) 09/01/2020    DTaP/Tdap/Td vaccine (1 - Tdap) 04/30/2021 (Originally 3/31/1990)    Breast cancer screen  01/07/2022    Hepatitis A vaccine  Aged Out    Hepatitis B vaccine  Aged Out    Hib vaccine  Aged Out    Meningococcal (ACWY) vaccine  Aged Out    Pneumococcal 0-64 years Vaccine  Aged Out       ASSESSMENT/PLAN:  1. Essential hypertension  Stable. Continue medication as prescribed. Ok to f/u every 12 mths for HTN.   Due for routine PE and labs 4/2021.    - metoprolol succinate (TOPROL XL) 25 MG extended release tablet; Take 1 tablet by mouth daily  Dispense: 30 tablet; Refill: 5      Return in about 5 months (around 4/13/2021) for Annual Physical, Routine office visit. An electronic signature was used to authenticate this note.     --JOB Luna - CNP on 11/5/2020 at 11:09 AM

## 2020-11-06 ENCOUNTER — TELEPHONE (OUTPATIENT)
Dept: FAMILY MEDICINE CLINIC | Age: 50
End: 2020-11-06

## 2020-11-06 NOTE — TELEPHONE ENCOUNTER
Pt said the Derm she was referred to is not accepting new pt's. She would like to be referred to someone else, please. I called Derm in [place] to see who they recommending for new pt's to see. She said Derm of [employer] unless they are an employee. Ref is pended.

## 2020-11-09 ENCOUNTER — INITIAL CONSULT (OUTPATIENT)
Dept: GASTROENTEROLOGY | Age: 50
End: 2020-11-09
Payer: OTHER GOVERNMENT

## 2020-11-09 VITALS — HEART RATE: 81 BPM | TEMPERATURE: 97.5 F | SYSTOLIC BLOOD PRESSURE: 125 MMHG | DIASTOLIC BLOOD PRESSURE: 78 MMHG

## 2020-11-09 PROCEDURE — 99203 OFFICE O/P NEW LOW 30 MIN: CPT | Performed by: INTERNAL MEDICINE

## 2020-11-09 RX ORDER — POLYETHYLENE GLYCOL 3350 17 G/17G
238 POWDER ORAL DAILY
Qty: 255 G | Refills: 0 | Status: SHIPPED | OUTPATIENT
Start: 2020-11-09 | End: 2021-02-18 | Stop reason: ALTCHOICE

## 2020-11-09 RX ORDER — METOCLOPRAMIDE 10 MG/1
10 TABLET ORAL EVERY 6 HOURS PRN
Qty: 20 TABLET | Refills: 0 | Status: SHIPPED | OUTPATIENT
Start: 2020-11-09 | End: 2021-02-18 | Stop reason: ALTCHOICE

## 2020-11-09 NOTE — PATIENT INSTRUCTIONS
Chester Cervantes    [address] [place] [address] ,  [address] [facility]  Phone: [phone] [facility] [address], [address] [facility], [facility] Kedar Mak  Phone: [phone]    Sedation  Three types of sedation are used for endoscopy and colonoscopy. The standard and most common is called conscious sedation. This is administered by the gastroenterologist and is part of the standard procedure. Common medications used for this are IV forms of a benzodiazepine (most commonly Versed) and a narcotic (most commonly fentanyl). Benadryl and nausea medicines may also be used. The effect of this is to make you comfortable. Most people will actually have amnesia with this and not recall the procedure. Some individuals will have other types of anesthesia provided by an anesthesiologist or nurse anesthetist.  The reason for this is a history of poor sedation, medication use that makes one more resistant to conscious sedation, a medical condition for which conscious sedation is contraindicated or other medical unstable conditions. This usually involves a separate fee from anesthesia. The most common of these is propofol (diprivan sedation) which is a deeper sedative the conscious sedation. In some instances, general anesthesia with intubation (breathing tube) is required. If you need to cancel or reschedule, please do so at least 2 weeks before the procedure, so that we can be considerate to other patients who are waiting to be scheduled. If you cancel or reschedule less than 7 days before your procedure, you will be placed on a lower priority list.    COLONOSCOPY OVERVIEW  A colonoscopy is an exam of the lower part of the gastrointestinal tract, which is called the colon or large intestine (bowel). Colonoscopy is a safe procedure that provides information other tests may not be able to give.  Patients who require colonoscopy often have questions and concerns about the water    The most common laxative treatment is called Go-Lytely® or Half-Lytely®. You can add a flavoring (included), which, unfortunately, only partially hides the unpleasant taste. Most doctors do not recommend that you add other flavorings to the solution. Refrigerating the solution can make it easier to drink. Drinking this solution may be the most unpleasant part of the exam. You will begin to have watery diarrhea within a short time after drinking the solution. If you become nauseated or vomit while drinking the solution, call your doctor or nurse for instructions. Medicines - You can take most prescription and nonprescription medicines right up to the day of the colonoscopy. Your doctor should tell you what medicines to stop. You should also tell the doctor if you are allergic to any medicines. Some medicines increase the risk of heavy bleeding if you have a biopsy during the colonoscopy. Ask your doctor how and when to stop these medicines, including warfarin/Coumadin® and clopidogrel/Plavix®. Transportation home - You will be given a sedative (a medicine to help you relax) during the colonoscopy, so you will need someone to take you home after your test. Although you will be awake by the time you go home, the sedative medicines cause changes in reflexes and judgment that can interfere with your ability to make decisions, similar to the effect of alcohol. WHAT TO EXPECT  Before the test, a doctor will review the test, including possible complications, and will ask you to sign a consent form. The nurse will start an IV line in your hand or arm. Your blood pressure and heart rate will be monitored during the test.    THE COLONOSCOPY PROCEDURE  You will be given fluid and medicines through an IV line. Many people sleep during the test, while others are very relaxed, comfortable, and generally not aware. The colonoscope is a flexible tube, approximately the size of the index finger.  The scope pumps air into the colon to inflate it and allow the doctor to see the entire lining. You might feel bloating or gas cramps as the air opens the colon. Try not to be embarrassed about passing this gas, and let your doctor know if you are uncomfortable. During the procedure, the doctor might take a biopsy (small pieces of tissue) or remove polyps. Polyps are growths of tissue that can range in size from the tip of a pen to several inches. Most polyps are benign (not cancerous). However, some polyps can become cancerous if allowed to grow for a long time. Having a polyp removed does not hurt. RECOVERY FROM COLONOSCOPY  After the colonoscopy, you will be observed in a recovery area until the effects of the sedative medication wear off. The most common complaint after colonoscopy is a feeling of bloating and gas cramps. You may also feel groggy from the sedation medications. You should not return to work or drive that day. Most people are able to eat normally after the test. Ask your doctor when it is safe to restart aspirin and other blood-thinning medications. COLONOSCOPY COMPLICATIONS  Colonoscopy is a safe procedure, and complications are rare but can occur:        Bleeding can occur from biopsies or the removal of polyps, but it is usually minimal and can be controlled. The colonoscope can cause a tear or hole (perforation) in the colon. This is a serious problem, but it does not happen commonly. It is possible to have side effects from the sedative medicines. Although colonoscopy is the best test to examine the colon, it is possible for even the most skilled doctors to miss or overlook an abnormal area in the colon.     You should call your doctor immediately if you have any of the following:        Severe abdominal pain (not just gas cramps)      A firm, bloated abdomen      Vomiting      Fever      Rectal bleeding (greater than a few tablespoons)    AFTER COLONOSCOPY  Although many people worry about being uncomfortable during a colonoscopy, most people tolerate it very well and feel fine afterward. It is normal to feel tired afterward. Plan to take it easy and relax the rest of the day. Your doctor can describe the results of the colonoscopy as soon as it is over. If s/he took biopsies or polyps, you should call for results within one to two weeks. We will make every attempt to get you your results by phone, mychart or sometimes a follow up visit, however, It is your responsibility to obtain your test results. If you do not get your results within 2 weeks, please call the office. Not all test results are available during your visit. If your test results are not back during the visit and you are still having symptoms, make an appointment with your caregiver to find out the results and any next steps. Do not assume everything is normal if you have not heard from your caregiver or the medical facility. It is important for you to follow up on all of your test results. WHERE TO GET MORE INFORMATION  Your healthcare provider is the best source of information for questions and concerns related to your medical problem. This article will be updated as needed every four months on our Web site ([email or web address]/patients). The following organizations also provide reliable health information. [employer] (www.nlm.nih.gov/medlineplus/colonoscopy.html)  [address] for Gastrointestinal Endoscopy  (www.asge.org/PatientInfoIndex. aspx?iu=755)

## 2020-11-09 NOTE — LETTER
[address] ,  [address] [facility]  Phone: [phone] [facility],  [address] [facility], [address] [place] [address]  Phone: [phone]    11/09/20    Patient:Mary Grimaldo  MR QSUJIM:[id number]  YOB: 1970  Date of Visit:11/9/20    Dear Dr. Leidy Smith, APRN - CNP    Thank you for the request for consultation for Libra Freitas to me for the evaluation of   Chief Complaint   Patient presents with   [facility] Patient     brother, age 46, colon cancer, wants checked    . Below are the relevant portions of my assessment and plan of care. FINAL DIAGNOSIS/Assessment   Diagnosis Orders   1. Preop testing  Covid-19 Ambulatory   2. Family history of colon cancer  COLONOSCOPY W/ OR W/O BIOPSY    bisacodyl (DULCOLAX) 5 MG EC tablet    polyethylene glycol (MIRALAX) 17 GM/SCOOP POWD powder    metoclopramide (REGLAN) 10 MG tablet       VISIT ORDERS/Plan  Orders Placed This Encounter   Procedures    COLONOSCOPY W/ OR W/O BIOPSY     Scheduling Instructions:      Please provide prep of choice instructions and prescription. General guidelines for holding blood thinners/anticoagulants around endoscopic procedure are but patients are encouraged to check with their prescribing physician. The patient may hold Plavix, Effient, Brilinta 5 days prior to the procedure unless:       A drug eluting stent has been placed within past 12 months. A nondrug eluting stent has been placed within past 1 month. Coumadin may be held 4 days prior to the procedure unless:        Mechanical mitral valve replacement (requires heparin bridge while Coumadin held and is managed by pharmacy)      Pradaxa, Xarelto, Eliquis may be held 2-3 days prior to procedure.   According to pharmacokinetics of the drug, package insert, cardiology practice patterns, and T1/2 of theses drugs (12 hrs), Eliquis and Xarelto are held 48hrs prior to any procedure, including major surgical procedures w/o       increased bleeding.  That is usually the standard of care, as coagulation would/should be normalized at 48hrs. Every attempt should be made to maintain ASA 81mg per day throughout the amrcelino-operative period in patients with diagnosis of ASHD. These recommendations may need to be modified by the provider/ based on risk /benefit analysis of the procedure and the patients history. If anticoagulation can not be held because recent cardiac stent, elective endoscopic procedures should be delayed until they have received the minimum duration of recommended antiplatlet therapy and it can safely be held. Again if unsure, patient should discuss with prescribing physician/service. If anticoagulation can not be stopped, endoscopic procedures can still be performed either diagnostically at a somewhat higher risk. Understand that any therapeutic procedure where anything beyond looking is performed, carries higher risks. For this reason without overt bleeding other testing       such as cologuard may be more appropriate. High risk endoscopic procedures that require stopping antiplatelet and anticoagulation therapy include polypectomy, biliary or pancreatic sphincterotomy, pneumatic or bougie dilation, PEG placement, therapeutic balloon-assisted enteroscopy, EUS and FNA, tumor ablation by any technique,       cystogastrostomy,and treatment of varices.     Covid-19 Ambulatory     Standing Status:   Future     Standing Expiration Date:   11/9/2021     Scheduling Instructions:      Saline media preferred given current shortage of viral transport media but both acceptable     Order Specific Question:   Status     Answer:   Asymptomatic/Surveillance (e.g. pre-op/pre-procedure, pre-delivery, transfer)     Order Specific Question:   Reason for Test Answer:   Upcoming elective surgery/procedure/delivery, return to work, or discharge to another facility       If you have questions, please do not hesitate to call me. I look forward to following [employer] along with you.     Sincerely,        Agata Bonds 11/9/20 3:11 PM EST

## 2020-11-09 NOTE — PROGRESS NOTES
[address] [address]  Phone: [phone]   AIF:[phone]    CHIEF COMPLAINT     Chief Complaint   Patient presents with    New Patient     brother, age 46, colon cancer, wants checked        HPI (location/symptom, timing/onset, duration, quality/severity, context, modifying factors, and associated signs/symptoms)     Thank you JOB Ortiz CNP for asking me to see Suzan Bernardo in consultation. She is a  [2] White [1] 53 y.o. Peggy Graven female seen independently  who presents with the following GI complaints:    Suzan Bernardo has a brother who is 1years older then her recently diagnosed with colon cancer. Denies associated rectal bleeding, constipation, diarrhea, change in weight. Modifying factors - none. While not currently having any GI symptoms she did have some abdominal pain in 2018 and 2019 that led to 2 separate CT scan showing a suspected adrenal adenoma. She has concerns about drinking a prep indicating she had nausea when drinking the contrast for the CT scans. Last Encounter Reviewed:   Pertinent PMH, FH, SH is reviewed below. Last EGD: none  Last Colonoscopy: none    Review of available records reveals:   Wt Readings from Last 50 Encounters:   05/07/18 150 lb (68 kg)   12/13/17 152 lb 4 oz (69.1 kg)   12/08/17 150 lb (68 kg)   11/16/17 150 lb (68 kg)   03/21/13 150 lb (68 kg)       No components found for: HGBA1C  BP Readings from Last 3 Encounters:   11/09/20 125/78   11/05/20 105/78   05/21/20 110/78     Health Maintenance   Topic Date Due    HIV screen  03/31/1986    Cervical cancer screen  03/31/1992    Lipid screen  03/31/2011    Potassium monitoring  06/06/2020    Creatinine monitoring  06/06/2020    Flu vaccine (1) 09/01/2020    DTaP/Tdap/Td vaccine (1 - Tdap) 04/30/2021 (Originally 3/31/1990)    Breast cancer screen  01/07/2022    Hepatitis A vaccine  Aged Out    Hepatitis B vaccine  Aged Out    Hib vaccine  Aged Out    Meningococcal (ACWY) vaccine  Aged Out    Pneumococcal 0-64 years Vaccine  Aged Out       No components found for: [facility]     PAST MEDICAL HISTORY     Past Medical History:   Diagnosis Date    GERD (gastroesophageal reflux disease)     Hypertension     PONV (postoperative nausea and vomiting)     Seasonal allergies      FAMILY HISTORY     Family History   Problem Relation Age of Onset    No Known Problems Mother     Heart Disease Father 52        CAD, hx of CABG    Colon Cancer Brother     No Known Problems Brother     Diabetes Paternal Aunt     Diabetes Paternal Uncle      SOCIAL HISTORY     Social History     Socioeconomic History    Marital status:      Spouse name: Not on file    Number of children: 1    Years of education: Not on file    Highest education level: Not on file   Occupational History    Not on file   Social Needs    Financial resource strain: Not very hard    Food insecurity     Worry: Never true     Inability: Never true    Transportation needs     Medical: No     Non-medical: No   Tobacco Use    Smoking status: Never Smoker    Smokeless tobacco: Never Used   Substance and Sexual Activity    Alcohol use: No    Drug use: No    Sexual activity: Yes     Partners: Male     Birth control/protection: Pill   Lifestyle    Physical activity     Days per week: 6 days     Minutes per session: 120 min    Stress:  Only a little   Relationships    Social connections     Talks on phone: Not on file     Gets together: Not on file     Attends Jewish service: Not on file     Active member of club or organization: Not on file     Attends meetings of clubs or organizations: Not on file     Relationship status: Not on file    Intimate partner violence     Fear of current or ex partner: Not on file     Emotionally abused: Not on file     Physically abused: Not on file     Forced sexual activity: Not on file   Other Topics Concern    Not on file   Social chest tightness. Cardiovascular: Negative for chest pain   Gastrointestinal:  No abdominal pain, heartburn, bloating, dysphagia, cough, chest pain, globus, regurgitation, diarrhea, constipation, nausea, or vomiting. Musculoskeletal: Negative for arthralgias. Skin: Negative for pallor. Neurological: Negative for weakness and light-headedness. Hematological: Negative for adenopathy. Does not bruise/bleed easily. Psychiatric/Behavioral: Negative for suicidal ideas. PHYSICAL EXAM (7 for level 4, 8 or more for level 5)   VITAL SIGNS: /78 (Site: Left Wrist, Position: Sitting, Cuff Size: Medium Adult)   Pulse 81   Temp 97.5 °F (36.4 °C) (Temporal)   LMP  (LMP Unknown)   Wt Readings from Last 3 Encounters:   05/07/18 150 lb (68 kg)   12/13/17 152 lb 4 oz (69.1 kg)   12/08/17 150 lb (68 kg)     Constitutional: Well developed, Well nourished, No acute distress, Non-toxic appearance. HENT: Normocephalic, Atraumatic, Bilateral external ears normal, Oropharynx moist, No oral exudates, Nose normal.   Eyes: Conjunctiva normal, No discharge. Neck: Normal range of motion, No tenderness, Supple, No stridor. Lymphatic: No cervical, subclavian, or axillary lymphadenopathy. Cardiovascular: Normal heart rate, Normal rhythm, No murmurs, No rubs, No gallops. Thorax & Lungs: Normal breath sounds, No respiratory distress, No wheezing, No chest tenderness. No gynecomastia. Abdomen/GI: scars consistent with stated surgeries, no hernias, no HSM, soft NTND   Rectal:  Deferred. Skin: Warm, Dry, No erythema, No rash. No bruising. No spider hemangiomas. Back: No tenderness, No CVA tenderness. Lower Extremities: Intact distal pulses, No edema, No tenderness, No cyanosis, No clubbing. Neurologic: Alert & oriented x 3, Normal motor function, Normal sensory function, No focal deficits noted. No asterixis.   RADIOLOGY/PROCEDURES       FINAL IMPRESSION     Orders Placed This Encounter   Procedures    COLONOSCOPY W/ OR W/O BIOPSY     Scheduling Instructions:      Please provide prep of choice instructions and prescription. General guidelines for holding blood thinners/anticoagulants around endoscopic procedure are but patients are encouraged to check with their prescribing physician. The patient may hold Plavix, Effient, Brilinta 5 days prior to the procedure unless:       A drug eluting stent has been placed within past 12 months. A nondrug eluting stent has been placed within past 1 month. Coumadin may be held 4 days prior to the procedure unless:        Mechanical mitral valve replacement (requires heparin bridge while Coumadin held and is managed by pharmacy)      Pradaxa, Xarelto, Eliquis may be held 2-3 days prior to procedure. According to pharmacokinetics of the drug, package insert, cardiology practice patterns, and T1/2 of theses drugs (12 hrs), Eliquis and Xarelto are held 48hrs prior to any procedure, including major surgical procedures w/o       increased bleeding.  That is usually the standard of care, as coagulation would/should be normalized at 48hrs. Every attempt should be made to maintain ASA 81mg per day throughout the marcelino-operative period in patients with diagnosis of ASHD. These recommendations may need to be modified by the provider/ based on risk /benefit analysis of the procedure and the patients history. If anticoagulation can not be held because recent cardiac stent, elective endoscopic procedures should be delayed until they have received the minimum duration of recommended antiplatlet therapy and it can safely be held. Again if unsure, patient should discuss with prescribing physician/service. If anticoagulation can not be stopped, endoscopic procedures can still be performed either diagnostically at a somewhat higher risk.   Understand that any therapeutic procedure where anything beyond looking is performed, carries higher risks. For this reason without overt bleeding other testing       such as cologuard may be more appropriate. High risk endoscopic procedures that require stopping antiplatelet and anticoagulation therapy include polypectomy, biliary or pancreatic sphincterotomy, pneumatic or bougie dilation, PEG placement, therapeutic balloon-assisted enteroscopy, EUS and FNA, tumor ablation by any technique,       cystogastrostomy,and treatment of varices.  Covid-19 Ambulatory     Standing Status:   Future     Standing Expiration Date:   11/9/2021     Scheduling Instructions:      Saline media preferred given current shortage of viral transport media but both acceptable     Order Specific Question:   Status     Answer:   Asymptomatic/Surveillance (e.g. pre-op/pre-procedure, pre-delivery, transfer)     Order Specific Question:   Reason for Test     Answer:   Upcoming elective surgery/procedure/delivery, return to work, or discharge to another facility     Ambar Mchugh was seen today for new patient. Diagnoses and all orders for this visit:    Preop testing  -     Covid-19 Ambulatory; Future    Family history of colon cancer  -     COLONOSCOPY W/ OR W/O BIOPSY  -     bisacodyl (DULCOLAX) 5 MG EC tablet; Take 4 tablets by mouth once for 1 dose Take as directed for colonoscopy. -     polyethylene glycol (MIRALAX) 17 GM/SCOOP POWD powder; Take 238 g by mouth daily Take as directed for colonoscopy  -     metoclopramide (REGLAN) 10 MG tablet; Take 1 tablet by mouth every 6 hours as needed (nausea)      ORDERED FUTURE/PENDING TESTS     Lab Frequency Next Occurrence   Lipid, Fasting Once 04/30/2020   Covid-19 Ambulatory Once 11/09/2020       FOLLOWUP   Return for Colonoscopy.           [place] 11/9/20 3:10 PM EST    CC:  JOB Phillips - CNP

## 2020-11-09 NOTE — TELEPHONE ENCOUNTER
Refill Request     Last Seen: 11/5/2020    Last Written: 11/5/2020    Next Appointment:   Future Appointments   Date Time Provider Isi Spaulding   11/9/2020  3:00 PM Lisa Wilkins MD [facility]             Requested Prescriptions     Pending Prescriptions Disp Refills    metoprolol succinate (TOPROL XL) 25 MG extended release tablet [Pharmacy Med Name: METOPROLOL ER SUCCINATE 25MG TABS] 90 tablet 1     Sig: TAKE 1 TABLET BY MOUTH DAILY     Pt requesting 90 day, not 30 day supply

## 2020-11-10 RX ORDER — METOPROLOL SUCCINATE 25 MG/1
25 TABLET, EXTENDED RELEASE ORAL DAILY
Qty: 90 TABLET | Refills: 1 | Status: SHIPPED | OUTPATIENT
Start: 2020-11-10 | End: 2021-05-04

## 2020-11-30 NOTE — PATIENT INSTRUCTIONS
Advance Care Planning  People with COVID-19 may have no symptoms, mild symptoms, such as fever, cough, and shortness of breath or they may have more severe illness, developing severe and fatal pneumonia. As a result, Advance Care Planning with attention to naming a health care decision maker (someone you trust to make healthcare decisions for you if you could not speak for yourself) and sharing other health care preferences is important BEFORE a possible health crisis. Please contact your Primary Care Provider to discuss Advance Care Planning. Preventing the Spread of Coronavirus Disease 2019 in Homes and Residential Communities  For the most recent information go to [email or web address]    Prevention steps for People with confirmed or suspected COVID-19 (including persons under investigation) who do not need to be hospitalized  and   People with confirmed COVID-19 who were hospitalized and determined to be medically stable to go home    Your healthcare provider and public health staff will evaluate whether you can be cared for at home. If it is determined that you do not need to be hospitalized and can be isolated at home, you will be monitored by staff from your local or state health department. You should follow the prevention steps below until a healthcare provider or local or state health department says you can return to your normal activities. Stay home except to get medical care  People who are mildly ill with COVID-19 are able to isolate at home during their illness. You should restrict activities outside your home, except for getting medical care. Do not go to work, school, or public areas. Avoid using public transportation, ride-sharing, or taxis. Separate yourself from other people and animals in your home  People: As much as possible, you should stay in a specific room and away from other people in your home.  Also, you should use a separate before eating or preparing food. If soap and water are not readily available, use an alcohol-based hand  with at least 60% alcohol, covering all surfaces of your hands and rubbing them together until they feel dry. Soap and water are the best option if hands are visibly dirty. Avoid touching your eyes, nose, and mouth with unwashed hands. Avoid sharing personal household items  You should not share dishes, drinking glasses, cups, eating utensils, towels, or bedding with other people or pets in your home. After using these items, they should be washed thoroughly with soap and water. Clean all high-touch surfaces everyday  High touch surfaces include counters, tabletops, doorknobs, bathroom fixtures, toilets, phones, keyboards, tablets, and bedside tables. Also, clean any surfaces that may have blood, stool, or body fluids on them. Use a household cleaning spray or wipe, according to the label instructions. Labels contain instructions for safe and effective use of the cleaning product including precautions you should take when applying the product, such as wearing gloves and making sure you have good ventilation during use of the product. Monitor your symptoms  Seek prompt medical attention if your illness is worsening (e.g., difficulty breathing). Before seeking care, call your healthcare provider and tell them that you have, or are being evaluated for, COVID-19. Put on a facemask before you enter the facility. These steps will help the healthcare providers office to keep other people in the office or waiting room from getting infected or exposed. Ask your healthcare provider to call the local or state health department. Persons who are placed under active monitoring or facilitated self-monitoring should follow instructions provided by their local health department or occupational health professionals, as appropriate. When working with your local health department check their available hours.   If you have a medical emergency and need to call 911, notify the dispatch personnel that you have, or are being evaluated for COVID-19. If possible, put on a facemask before emergency medical services arrive. Discontinuing home isolation  Patients with confirmed COVID-19 should remain under home isolation precautions until the risk of secondary transmission to others is thought to be low. The decision to discontinue home isolation precautions should be made on a case-by-case basis, in consultation with healthcare providers and state and local health departments.

## 2020-11-30 NOTE — PROGRESS NOTES
Katie Petit received a viral test for COVID-19. They were educated on isolation and quarantine as appropriate. For any symptoms, they were directed to seek care from their PCP, given contact information to establish with a doctor, directed to an urgent care or the emergency room.

## 2020-12-01 ENCOUNTER — OFFICE VISIT (OUTPATIENT)
Dept: PRIMARY CARE CLINIC | Age: 50
End: 2020-12-01
Payer: OTHER GOVERNMENT

## 2020-12-01 PROCEDURE — 99211 OFF/OP EST MAY X REQ PHY/QHP: CPT | Performed by: NURSE PRACTITIONER

## 2020-12-02 LAB — SARS-COV-2: NOT DETECTED

## 2020-12-07 ENCOUNTER — HOSPITAL ENCOUNTER (OUTPATIENT)
Age: 50
Setting detail: OUTPATIENT SURGERY
Discharge: HOME | End: 2020-12-07
Attending: INTERNAL MEDICINE | Admitting: INTERNAL MEDICINE
Payer: OTHER GOVERNMENT

## 2020-12-07 VITALS
OXYGEN SATURATION: 100 % | HEART RATE: 68 BPM | DIASTOLIC BLOOD PRESSURE: 62 MMHG | TEMPERATURE: 97.7 F | HEIGHT: 68 IN | WEIGHT: 150 LBS | SYSTOLIC BLOOD PRESSURE: 114 MMHG | RESPIRATION RATE: 16 BRPM | BODY MASS INDEX: 22.73 KG/M2

## 2020-12-07 LAB — PREGNANCY, URINE: NEGATIVE

## 2020-12-07 PROCEDURE — 2709999900 HC NON-CHARGEABLE SUPPLY: Performed by: INTERNAL MEDICINE

## 2020-12-07 PROCEDURE — 99152 MOD SED SAME PHYS/QHP 5/>YRS: CPT | Performed by: INTERNAL MEDICINE

## 2020-12-07 PROCEDURE — 2580000003 HC RX 258: Performed by: INTERNAL MEDICINE

## 2020-12-07 PROCEDURE — 99153 MOD SED SAME PHYS/QHP EA: CPT | Performed by: INTERNAL MEDICINE

## 2020-12-07 PROCEDURE — 45378 DIAGNOSTIC COLONOSCOPY: CPT | Performed by: INTERNAL MEDICINE

## 2020-12-07 PROCEDURE — 3609027000 HC COLONOSCOPY: Performed by: INTERNAL MEDICINE

## 2020-12-07 PROCEDURE — 6360000002 HC RX W HCPCS: Performed by: INTERNAL MEDICINE

## 2020-12-07 PROCEDURE — 84703 CHORIONIC GONADOTROPIN ASSAY: CPT

## 2020-12-07 PROCEDURE — 7100000010 HC PHASE II RECOVERY - FIRST 15 MIN: Performed by: INTERNAL MEDICINE

## 2020-12-07 PROCEDURE — 7100000011 HC PHASE II RECOVERY - ADDTL 15 MIN: Performed by: INTERNAL MEDICINE

## 2020-12-07 RX ORDER — FENTANYL CITRATE 50 UG/ML
INJECTION, SOLUTION INTRAMUSCULAR; INTRAVENOUS PRN
Status: DISCONTINUED | OUTPATIENT
Start: 2020-12-07 | End: 2020-12-07 | Stop reason: ALTCHOICE

## 2020-12-07 RX ORDER — SODIUM CHLORIDE, SODIUM LACTATE, POTASSIUM CHLORIDE, CALCIUM CHLORIDE 600; 310; 30; 20 MG/100ML; MG/100ML; MG/100ML; MG/100ML
INJECTION, SOLUTION INTRAVENOUS CONTINUOUS PRN
Status: DISCONTINUED | OUTPATIENT
Start: 2020-12-07 | End: 2020-12-07 | Stop reason: ALTCHOICE

## 2020-12-07 RX ORDER — SODIUM CHLORIDE, SODIUM LACTATE, POTASSIUM CHLORIDE, CALCIUM CHLORIDE 600; 310; 30; 20 MG/100ML; MG/100ML; MG/100ML; MG/100ML
INJECTION, SOLUTION INTRAVENOUS ONCE
Status: COMPLETED | OUTPATIENT
Start: 2020-12-07 | End: 2020-12-07

## 2020-12-07 RX ORDER — ONDANSETRON 2 MG/ML
INJECTION INTRAMUSCULAR; INTRAVENOUS
Status: DISCONTINUED
Start: 2020-12-07 | End: 2020-12-07 | Stop reason: HOSPADM

## 2020-12-07 RX ORDER — MIDAZOLAM HYDROCHLORIDE 5 MG/ML
INJECTION INTRAMUSCULAR; INTRAVENOUS PRN
Status: DISCONTINUED | OUTPATIENT
Start: 2020-12-07 | End: 2020-12-07 | Stop reason: ALTCHOICE

## 2020-12-07 RX ORDER — DIPHENHYDRAMINE HYDROCHLORIDE 50 MG/ML
INJECTION INTRAMUSCULAR; INTRAVENOUS PRN
Status: DISCONTINUED | OUTPATIENT
Start: 2020-12-07 | End: 2020-12-07 | Stop reason: ALTCHOICE

## 2020-12-07 RX ORDER — ONDANSETRON 2 MG/ML
4 INJECTION INTRAMUSCULAR; INTRAVENOUS ONCE
Status: COMPLETED | OUTPATIENT
Start: 2020-12-07 | End: 2020-12-07

## 2020-12-07 RX ORDER — ONDANSETRON 2 MG/ML
INJECTION INTRAMUSCULAR; INTRAVENOUS PRN
Status: DISCONTINUED | OUTPATIENT
Start: 2020-12-07 | End: 2020-12-07 | Stop reason: ALTCHOICE

## 2020-12-07 RX ADMIN — SODIUM CHLORIDE, POTASSIUM CHLORIDE, SODIUM LACTATE AND CALCIUM CHLORIDE: 600; 310; 30; 20 INJECTION, SOLUTION INTRAVENOUS at 09:29

## 2020-12-07 ASSESSMENT — PAIN SCALES - GENERAL: PAINLEVEL_OUTOF10: 0

## 2020-12-07 ASSESSMENT — PAIN - FUNCTIONAL ASSESSMENT: PAIN_FUNCTIONAL_ASSESSMENT: 0-10

## 2020-12-07 NOTE — H&P
[facility],4Th Floor,  [address] [place] [address]  Phone: [phone]   Fax:[phone]     CHIEF COMPLAINT           Chief Complaint   Patient presents with    New Patient       brother, age 46, colon cancer, wants checked          HPI (location/symptom, timing/onset, duration, quality/severity, context, modifying factors, and associated signs/symptoms)      Thank you JOB Dennis CNP for asking me to see Jj Rudd in consultation. She is a  [2] White [1] 53 y.o. [facility] seen independently  who presents with the following GI complaints:     Jj Rudd has a brother who is 1years older then her recently diagnosed with colon cancer. Denies associated rectal bleeding, constipation, diarrhea, change in weight. Modifying factors - none. While not currently having any GI symptoms she did have some abdominal pain in 2018 and 2019 that led to 2 separate CT scan showing a suspected adrenal adenoma. She has concerns about drinking a prep indicating she had nausea when drinking the contrast for the CT scans.     Last Encounter Reviewed:   Pertinent PMH, FH, SH is reviewed below. Last EGD: none  Last Colonoscopy: none     Review of available records reveals:       Wt Readings from Last 50 Encounters:   05/07/18 150 lb (68 kg)   12/13/17 152 lb 4 oz (69.1 kg)   12/08/17 150 lb (68 kg)   11/16/17 150 lb (68 kg)   03/21/13 150 lb (68 kg)         No components found for: HGBA1C  BP Readings from Last 3 Encounters:   11/09/20 125/78   11/05/20 105/78   05/21/20 110/78           Health Maintenance   Topic Date Due    HIV screen  03/31/1986    Cervical cancer screen  03/31/1992    Lipid screen  03/31/2011    Potassium monitoring  06/06/2020    Creatinine monitoring  06/06/2020    Flu vaccine (1) 09/01/2020    DTaP/Tdap/Td vaccine (1 - Tdap) 04/30/2021 (Originally 3/31/1990)    Breast cancer screen  01/07/2022    Hepatitis A vaccine  Aged Out    Hepatitis B vaccine  Aged Out    Hib vaccine  Aged Out    Meningococcal (ACWY) vaccine  Aged Out    Pneumococcal 0-64 years Vaccine  Aged Out         No components found for: [facility]      PAST MEDICAL HISTORY      Past Medical History        Past Medical History:   Diagnosis Date    GERD (gastroesophageal reflux disease)      Hypertension      PONV (postoperative nausea and vomiting)      Seasonal allergies          FAMILY HISTORY      Family History         Family History   Problem Relation Age of Onset    No Known Problems Mother      Heart Disease Father 52         CAD, hx of CABG    Colon Cancer Brother      No Known Problems Brother      Diabetes Paternal Aunt      Diabetes Paternal Uncle          SOCIAL HISTORY      Social History               Socioeconomic History    Marital status:        Spouse name: Not on file    Number of children: 1    Years of education: Not on file    Highest education level: Not on file   Occupational History    Not on file   Social Needs    Financial resource strain: Not very hard    Food insecurity       Worry: Never true       Inability: Never true    Transportation needs       Medical: No       Non-medical: No   Tobacco Use    Smoking status: Never Smoker    Smokeless tobacco: Never Used   Substance and Sexual Activity    Alcohol use: No    Drug use: No    Sexual activity: Yes       Partners: Male       Birth control/protection: Pill   Lifestyle    Physical activity       Days per week: 6 days       Minutes per session: 120 min    Stress:  Only a little   Relationships    Social connections       Talks on phone: Not on file       Gets together: Not on file       Attends Evangelical service: Not on file       Active member of club or organization: Not on file       Attends meetings of clubs or organizations: Not on file       Relationship status: Not on file    Intimate partner violence       Fear of current or ex partner: Not on file       tenderness, No cyanosis, No clubbing. Neurologic: Alert & oriented x 3, Normal motor function, Normal sensory function, No focal deficits noted. No asterixis. RADIOLOGY/PROCEDURES         FINAL IMPRESSION             Orders Placed This Encounter   Procedures    COLONOSCOPY W/ OR W/O BIOPSY       Scheduling Instructions:         Please provide prep of choice instructions and prescription.                     General guidelines for holding blood thinners/anticoagulants around endoscopic procedure are but patients are encouraged to check with their prescribing physician.                   The patient may hold Plavix, Effient, Brilinta 5 days prior to the procedure unless:          A drug eluting stent has been placed within past 12 months.         A nondrug eluting stent has been placed within past 1 month.         Coumadin may be held 4 days prior to the procedure unless:           Mechanical mitral valve replacement (requires heparin bridge while Coumadin held and is managed by pharmacy)         Pradaxa, Xarelto, Eliquis may be held 2-3 days prior to procedure. According to pharmacokinetics of the drug, package insert, cardiology practice patterns, and T1/2 of theses drugs (12 hrs), Eliquis and Xarelto are held 48hrs prior to any procedure, including major surgical procedures w/o          increased bleeding.  That is usually the standard of care, as coagulation would/should be normalized at 48hrs.         Every attempt should be made to maintain ASA 81mg per day throughout the marcelino-operative period in patients with diagnosis of ASHD.           These recommendations may need to be modified by the provider/ based on risk /benefit analysis of the procedure and the patients history.                   If anticoagulation can not be held because recent cardiac stent, elective endoscopic procedures should be delayed until they have received the minimum duration of recommended antiplatlet therapy and it can safely be held. Again if unsure, patient should discuss with prescribing physician/service.                   If anticoagulation can not be stopped, endoscopic procedures can still be performed either diagnostically at a somewhat higher risk. Understand that any therapeutic procedure where anything beyond looking is performed, carries higher risks. For this reason without overt bleeding other testing          such as cologuard may be more appropriate.                     High risk endoscopic procedures that require stopping antiplatelet and anticoagulation therapy include polypectomy, biliary or pancreatic sphincterotomy, pneumatic or bougie dilation, PEG placement, therapeutic balloon-assisted enteroscopy, EUS and FNA, tumor ablation by any technique,          cystogastrostomy,and treatment of varices.  Covid-19 Ambulatory       Standing Status:   Future       Standing Expiration Date:   11/9/2021       Scheduling Instructions:         Saline media preferred given current shortage of viral transport media but both acceptable       Order Specific Question:   Status       Answer:   Asymptomatic/Surveillance (e.g. pre-op/pre-procedure, pre-delivery, transfer)       Order Specific Question:   Reason for Test       Answer:   Upcoming elective surgery/procedure/delivery, return to work, or discharge to another facility      [place] was seen today for new patient.     Diagnoses and all orders for this visit:     Preop testing  -     Covid-19 Ambulatory; Future     Family history of colon cancer  -     COLONOSCOPY W/ OR W/O BIOPSY  -     bisacodyl (DULCOLAX) 5 MG EC tablet; Take 4 tablets by mouth once for 1 dose Take as directed for colonoscopy. -     polyethylene glycol (MIRALAX) 17 GM/SCOOP POWD powder; Take 238 g by mouth daily Take as directed for colonoscopy  -     metoclopramide (REGLAN) 10 MG tablet;  Take 1 tablet by mouth every 6 hours as needed (nausea)        ORDERED FUTURE/PENDING TESTS      Lab Frequency Next Occurrence   Lipid, Fasting Once 04/30/2020   Covid-19 Ambulatory Once 11/09/2020         FOLLOWUP   Return for Colonoscopy. The patient was counseled at length about the risks of joseluis Covid-19 during their perioperative period and any recovery window from their procedure. The patient was made aware that joseluis Covid-19  may worsen their prognosis for recovering from their procedure  and lend to a higher morbidity and/or mortality risk. All material risks, benefits, and reasonable alternatives including postponing the procedure were discussed. The patient does wish to proceed with the procedure at this time. Preprocedural COVID-19 throat swab was negative.       Ran Flores 12/7/20 9:59 AM EST

## 2021-02-18 ENCOUNTER — NURSE TRIAGE (OUTPATIENT)
Dept: OTHER | Facility: CLINIC | Age: 51
End: 2021-02-18

## 2021-02-18 ENCOUNTER — OFFICE VISIT (OUTPATIENT)
Dept: FAMILY MEDICINE CLINIC | Age: 51
End: 2021-02-18
Payer: OTHER GOVERNMENT

## 2021-02-18 VITALS
TEMPERATURE: 97.3 F | OXYGEN SATURATION: 97 % | SYSTOLIC BLOOD PRESSURE: 120 MMHG | DIASTOLIC BLOOD PRESSURE: 78 MMHG | HEART RATE: 68 BPM

## 2021-02-18 DIAGNOSIS — R10.32 LEFT LOWER QUADRANT ABDOMINAL PAIN: Primary | ICD-10-CM

## 2021-02-18 LAB
BILIRUBIN, POC: NEGATIVE
BLOOD URINE, POC: NEGATIVE
CLARITY, POC: NORMAL
COLOR, POC: YELLOW
GLUCOSE URINE, POC: NEGATIVE
KETONES, POC: NEGATIVE
LEUKOCYTE EST, POC: NEGATIVE
NITRITE, POC: NEGATIVE
PH, POC: 6
PROTEIN, POC: NEGATIVE
SPECIFIC GRAVITY, POC: 1.02
UROBILINOGEN, POC: 0.2

## 2021-02-18 PROCEDURE — 81002 URINALYSIS NONAUTO W/O SCOPE: CPT | Performed by: NURSE PRACTITIONER

## 2021-02-18 PROCEDURE — 99214 OFFICE O/P EST MOD 30 MIN: CPT | Performed by: NURSE PRACTITIONER

## 2021-02-18 ASSESSMENT — ENCOUNTER SYMPTOMS
RESPIRATORY NEGATIVE: 1
CONSTIPATION: 0
ABDOMINAL DISTENTION: 0
RECTAL PAIN: 0
ABDOMINAL PAIN: 1
DIARRHEA: 1
NAUSEA: 0
ANAL BLEEDING: 0
BLOOD IN STOOL: 0
VOMITING: 0

## 2021-02-18 NOTE — PROGRESS NOTES
2021     Oliver Joshi (:  1970) is a 53 y.o. female, here for evaluation of the following medical concerns:    HPI  Pt c/o LLQ pain for the past 2 weeks. She states that she has also had a few episodes of diarrhea. She states that she has also had left flank pain with some urinary frequency. Denies fever, chills, N/V. Pt had a colonoscopy 2020 - pt states that she did not receive results, and the order is still active in her chart. UTD on routine gynecological exams. No h/o abnormal Pap smears. [facility] She states that she had similar symptoms several years ago. She states that [facility] did a transvaginal ultrasound and did not have a cyst.      Abdomen/Pelvis CT 2019:  FINDINGS:   Lower Chest: De pendant opacity is seen at the lung bases, likely   atelectasis.  No pleural effusion.  Small hiatal hernia is seen. Malou Charles is   nonspecific thickening at the GE junction.       Organs: No perisplenic fluid       Adrenal glands appear unchanged.  There is a  focal area on the left   measuring approximately 7 mm, likely adenoma or hyperplasia       No hydronephrosis on the right.  No hydronephrosis on the left.  Hypodense   nodule seen in the right kidney, measuring 8 mm, likely cyst..  Tiny   hypodense nodule left kidney appears similar, likely cyst.       Focal hypodensity is seen along the falciform, similar to prior, likely focal   fat.  No significant inflammatory change surrounding the gallbladder       Tiny punctate hypodense nodule seen inferiorly in the right hepatic lobe,   similar to prior, likely cyst       No peripancreatic inflammatory change       GI/Bowel: Mild stool load seen in the colon.  A few colonic diverticula are   seen.  No bowel obstruction.  No significant pericolonic inflammatory change       Pelvis:  There is lobular contour of the uterus.  Trace free fluid seen within   the pelvis.       Peritoneum/Retroperitoneum: No retroperitoneal adenopathy.  No aortic aneurysm       Subtle injection of the fat in the retroperitoneum surrounding the aorta is   less pronounced.       Bones/Soft Tissues: Spurring is seen in the spine.  Spurring is seen in the   hips.  Appearance is similar           Review of Systems   Constitutional: Negative. Respiratory: Negative. Gastrointestinal: Positive for abdominal pain and diarrhea. Negative for abdominal distention, anal bleeding, blood in stool, constipation, nausea, rectal pain and vomiting. Genitourinary: Positive for flank pain and frequency. Negative for decreased urine volume, difficulty urinating, dyspareunia, dysuria, enuresis, genital sores, hematuria, menstrual problem, pelvic pain, urgency, vaginal bleeding, vaginal discharge and vaginal pain. Skin: Negative. Prior to Visit Medications    Medication Sig Taking? Authorizing Provider   metoprolol succinate (TOPROL XL) 25 MG extended release tablet TAKE 1 TABLET BY MOUTH DAILY Yes Dena Runner, APRN - CNP   LO LOESTRIN FE 1 MG-10 MCG / 10 MCG tablet TK 1 T PO QD Yes Historical Provider, MD   OMEGA 3-6-9 FATTY ACIDS PO Take by mouth Yes Historical Provider, MD   Multiple Vitamins-Minerals (THERAPEUTIC MULTIVITAMIN-MINERALS) tablet Take 1 tablet by mouth daily Yes Historical Provider, MD   vitamin B-12 (CYANOCOBALAMIN) 100 MCG tablet Take 50 mcg by mouth daily Yes Historical Provider, MD   loratadine (CLARITIN) 10 MG tablet Take 10 mg by mouth daily Yes Historical Provider, MD        Social History     Tobacco Use    Smoking status: Never Smoker    Smokeless tobacco: Never Used   Substance Use Topics    Alcohol use: No        Vitals:    02/18/21 1653   BP: 120/78   Site: Left Upper Arm   Position: Sitting   Cuff Size: Medium Adult   Pulse: 68   Temp: 97.3 °F (36.3 °C)   TempSrc: Temporal   SpO2: 97%     Estimated body mass index is 22.81 kg/m² as calculated from the following:    Height as of 12/7/20: 5' 8\" (1.727 m).     Weight as of 12/7/20: 150 lb (68 kg). Physical Exam  Constitutional:       Appearance: Normal appearance. HENT:      Head: Normocephalic. Cardiovascular:      Rate and Rhythm: Normal rate and regular rhythm. Heart sounds: Normal heart sounds. Pulmonary:      Effort: Pulmonary effort is normal.      Breath sounds: Normal breath sounds. Abdominal:      General: Abdomen is flat. Bowel sounds are decreased. Palpations: Abdomen is soft. Tenderness: There is abdominal tenderness in the left lower quadrant. There is no right CVA tenderness or left CVA tenderness. Skin:     General: Skin is warm and dry. Neurological:      Mental Status: She is alert. ASSESSMENT/PLAN:  1. Left lower quadrant abdominal pain  Urine dip was normal, will send for culture. Will contact [phone] for recent colonoscopy results. Will obtain records from [facility]. Advised pt that I would f/u with her regarding her CT results. - CT ABDOMEN PELVIS W IV CONTRAST Additional Contrast? Radiologist Recommendation  - Culture, Urine  - POCT Urinalysis no Micro  - CREATININE, SERUM      Return if symptoms worsen or fail to improve. An electronic signature was used to authenticate this note.     --Reema Jefferson, JOB - CNP on 2/18/2021 at 5:39 PM

## 2021-02-18 NOTE — TELEPHONE ENCOUNTER
Patient called Andrea Kerns  at [facility])  with red flag complaint. Brief description of triage: flank pain    Triage indicates for patient to see today    Care advice provided, patient verbalizes understanding; denies any other questions or concerns; instructed to call back for any new or worsening symptoms. Writer provided warm transfer to Daniella at [facility] for appointment scheduling. Attention Provider: Thank you for allowing me to participate in the care of your patient. The patient was connected to triage in response to information provided to the ECC. Please do not respond through this encounter as the response is not directed to a shared pool. Reason for Disposition   MODERATE pain (e.g., interferes with normal activities or awakens from sleep)    Answer Assessment - Initial Assessment Questions  1. LOCATION: \"Where does it hurt? \" (e.g., left, right)      Left flank pain, some on right flank, and to lower abdomen . 2. ONSET: \"When did the pain start? \"      Last night. 3. SEVERITY: \"How bad is the pain? \" (e.g., Scale 1-10; mild, moderate, or severe)    - MILD (1-3): doesn't interfere with normal activities     - MODERATE (4-7): interferes with normal activities or awakens from sleep     - SEVERE (8-10): excruciating pain and patient unable to do normal activities (stays in bed)        2/10 currently, can get severe, like last night. Woke with pain during night. 4. PATTERN: \"Does the pain come and go, or is it constant? \"       Comes and goes. Had severe pain trying to have bowel movement- left lower back and abdomen area. 5. CAUSE: \"What do you think is causing the pain? \"      Unsure. 6. OTHER SYMPTOMS:  \"Do you have any other symptoms? \" (e.g., fever, abdominal pain, vomiting, leg weakness, burning with urination, blood in urine)      No burning with urination, at times has \"razor\" feeling in vaginal area.   Mostly left flank pain, at times right side and at times lower abdomen, both sides. Has frequency. Feels pressure to urinate. 7. PREGNANCY:  \"Is there any chance you are pregnant? \" \"When was your last menstrual period? \"      denies    Protocols used:  FLANK PAIN-ADULT-[place]

## 2021-02-19 LAB — URINE CULTURE, ROUTINE: NORMAL

## 2021-02-25 ENCOUNTER — HOSPITAL ENCOUNTER (OUTPATIENT)
Dept: CT IMAGING | Age: 51
Discharge: HOME | End: 2021-02-25
Payer: OTHER GOVERNMENT

## 2021-02-25 ENCOUNTER — TELEPHONE (OUTPATIENT)
Dept: FAMILY MEDICINE CLINIC | Age: 51
End: 2021-02-25

## 2021-02-25 DIAGNOSIS — R10.32 LEFT LOWER QUADRANT ABDOMINAL PAIN: ICD-10-CM

## 2021-02-25 DIAGNOSIS — R10.32 LEFT LOWER QUADRANT ABDOMINAL PAIN: Primary | ICD-10-CM

## 2021-02-25 LAB
CREAT SERPL-MCNC: 0.9 MG/DL (ref 0.6–1.1)
GFR AFRICAN AMERICAN: >60
GFR AFRICAN AMERICAN: >60
GFR NON-AFRICAN AMERICAN: 59
GFR NON-AFRICAN AMERICAN: >60
PERFORMED ON: ABNORMAL
POC CREATININE: 1 MG/DL (ref 0.6–1.1)
POC SAMPLE TYPE: ABNORMAL

## 2021-02-25 PROCEDURE — 82565 ASSAY OF CREATININE: CPT

## 2021-02-25 PROCEDURE — 6360000004 HC RX CONTRAST MEDICATION: Performed by: NURSE PRACTITIONER

## 2021-02-25 PROCEDURE — 74177 CT ABD & PELVIS W/CONTRAST: CPT

## 2021-02-25 RX ADMIN — IOPAMIDOL 75 ML: 755 INJECTION, SOLUTION INTRAVENOUS at 10:47

## 2021-02-25 NOTE — TELEPHONE ENCOUNTER
Pt. Is currently at the CT department but did not get her Creatinine level done before scan. Patient was asking if she should still have CT done. I did recommend to have Creatinine done first, as it is also a requirement for CT Dept. I did inform patient to follow CT Dept. Guidelines.

## 2021-04-20 ENCOUNTER — HOSPITAL ENCOUNTER (INPATIENT)
Age: 51
LOS: 1 days | Discharge: HOME | DRG: 176 | End: 2021-04-21
Attending: EMERGENCY MEDICINE | Admitting: INTERNAL MEDICINE
Payer: OTHER GOVERNMENT

## 2021-04-20 ENCOUNTER — OFFICE VISIT (OUTPATIENT)
Dept: FAMILY MEDICINE CLINIC | Age: 51
End: 2021-04-20
Payer: OTHER GOVERNMENT

## 2021-04-20 ENCOUNTER — HOSPITAL ENCOUNTER (OUTPATIENT)
Dept: CT IMAGING | Age: 51
Discharge: HOME | DRG: 176 | End: 2021-04-20
Payer: OTHER GOVERNMENT

## 2021-04-20 ENCOUNTER — TELEPHONE (OUTPATIENT)
Dept: FAMILY MEDICINE CLINIC | Age: 51
End: 2021-04-20

## 2021-04-20 ENCOUNTER — HOSPITAL ENCOUNTER (OUTPATIENT)
Age: 51
Discharge: HOME | DRG: 176 | End: 2021-04-20
Payer: OTHER GOVERNMENT

## 2021-04-20 VITALS — HEART RATE: 71 BPM | OXYGEN SATURATION: 97 % | SYSTOLIC BLOOD PRESSURE: 110 MMHG | DIASTOLIC BLOOD PRESSURE: 62 MMHG

## 2021-04-20 DIAGNOSIS — R06.02 SHORTNESS OF BREATH: ICD-10-CM

## 2021-04-20 DIAGNOSIS — R07.89 CHEST TIGHTNESS: Primary | ICD-10-CM

## 2021-04-20 DIAGNOSIS — R06.02 SOB (SHORTNESS OF BREATH): ICD-10-CM

## 2021-04-20 DIAGNOSIS — R07.9 CHEST PAIN, UNSPECIFIED TYPE: ICD-10-CM

## 2021-04-20 DIAGNOSIS — R07.89 CHEST TIGHTNESS: ICD-10-CM

## 2021-04-20 DIAGNOSIS — I26.94 MULTIPLE SUBSEGMENTAL PULMONARY EMBOLI WITHOUT ACUTE COR PULMONALE (HCC): Primary | ICD-10-CM

## 2021-04-20 PROBLEM — I26.99 PULMONARY EMBOLISM AND INFARCTION (HCC): Status: ACTIVE | Noted: 2021-04-20

## 2021-04-20 PROBLEM — I26.99 PULMONARY EMBOLISM (HCC): Status: ACTIVE | Noted: 2021-04-20

## 2021-04-20 LAB
A/G RATIO: 1.3 (ref 1.1–2.2)
ALBUMIN SERPL-MCNC: 4.3 G/DL (ref 3.4–5)
ALP BLD-CCNC: 55 U/L (ref 40–129)
ALT SERPL-CCNC: 11 U/L (ref 10–40)
ANION GAP SERPL CALCULATED.3IONS-SCNC: 11 MMOL/L (ref 3–16)
APTT: 25 SEC (ref 24.2–36.2)
AST SERPL-CCNC: 16 U/L (ref 15–37)
BASOPHILS ABSOLUTE: 0 K/UL (ref 0–0.2)
BASOPHILS RELATIVE PERCENT: 0.3 %
BILIRUB SERPL-MCNC: 0.5 MG/DL (ref 0–1)
BUN BLDV-MCNC: 13 MG/DL (ref 7–20)
BUN BLDV-MCNC: 13 MG/DL (ref 7–20)
CALCIUM SERPL-MCNC: 9.8 MG/DL (ref 8.3–10.6)
CHLORIDE BLD-SCNC: 101 MMOL/L (ref 99–110)
CO2: 24 MMOL/L (ref 21–32)
CREAT SERPL-MCNC: 0.7 MG/DL (ref 0.6–1.1)
CREAT SERPL-MCNC: 0.7 MG/DL (ref 0.6–1.1)
EOSINOPHILS ABSOLUTE: 0 K/UL (ref 0–0.6)
EOSINOPHILS RELATIVE PERCENT: 0.2 %
GFR AFRICAN AMERICAN: >60
GFR AFRICAN AMERICAN: >60
GFR NON-AFRICAN AMERICAN: >60
GFR NON-AFRICAN AMERICAN: >60
GLOBULIN: 3.4 G/DL
GLUCOSE BLD-MCNC: 88 MG/DL (ref 70–99)
HCT VFR BLD CALC: 38 % (ref 36–48)
HEMOGLOBIN: 12.8 G/DL (ref 12–16)
INR BLD: 1.04 (ref 0.86–1.14)
LYMPHOCYTES ABSOLUTE: 1.7 K/UL (ref 1–5.1)
LYMPHOCYTES RELATIVE PERCENT: 13 %
MCH RBC QN AUTO: 31.8 PG (ref 26–34)
MCHC RBC AUTO-ENTMCNC: 33.7 G/DL (ref 31–36)
MCV RBC AUTO: 94.3 FL (ref 80–100)
MONOCYTES ABSOLUTE: 1 K/UL (ref 0–1.3)
MONOCYTES RELATIVE PERCENT: 8 %
NEUTROPHILS ABSOLUTE: 10.1 K/UL (ref 1.7–7.7)
NEUTROPHILS RELATIVE PERCENT: 78.5 %
PDW BLD-RTO: 12.6 % (ref 12.4–15.4)
PLATELET # BLD: 311 K/UL (ref 135–450)
PMV BLD AUTO: 8.5 FL (ref 5–10.5)
POTASSIUM SERPL-SCNC: 3.8 MMOL/L (ref 3.5–5.1)
PROTHROMBIN TIME: 12.1 SEC (ref 10–13.2)
RBC # BLD: 4.03 M/UL (ref 4–5.2)
SODIUM BLD-SCNC: 136 MMOL/L (ref 136–145)
SPECIMEN STATUS: NORMAL
TOTAL PROTEIN: 7.7 G/DL (ref 6.4–8.2)
TROPONIN: <0.01 NG/ML
TROPONIN: <0.01 NG/ML
WBC # BLD: 12.8 K/UL (ref 4–11)

## 2021-04-20 PROCEDURE — 1200000000 HC SEMI PRIVATE

## 2021-04-20 PROCEDURE — 84520 ASSAY OF UREA NITROGEN: CPT

## 2021-04-20 PROCEDURE — 85025 COMPLETE CBC W/AUTO DIFF WBC: CPT

## 2021-04-20 PROCEDURE — 6360000002 HC RX W HCPCS: Performed by: EMERGENCY MEDICINE

## 2021-04-20 PROCEDURE — 82565 ASSAY OF CREATININE: CPT

## 2021-04-20 PROCEDURE — 96374 THER/PROPH/DIAG INJ IV PUSH: CPT

## 2021-04-20 PROCEDURE — 36415 COLL VENOUS BLD VENIPUNCTURE: CPT

## 2021-04-20 PROCEDURE — 71260 CT THORAX DX C+: CPT

## 2021-04-20 PROCEDURE — 84484 ASSAY OF TROPONIN QUANT: CPT

## 2021-04-20 PROCEDURE — 85610 PROTHROMBIN TIME: CPT

## 2021-04-20 PROCEDURE — 2580000003 HC RX 258: Performed by: INTERNAL MEDICINE

## 2021-04-20 PROCEDURE — 6370000000 HC RX 637 (ALT 250 FOR IP): Performed by: NURSE PRACTITIONER

## 2021-04-20 PROCEDURE — 93000 ELECTROCARDIOGRAM COMPLETE: CPT | Performed by: NURSE PRACTITIONER

## 2021-04-20 PROCEDURE — 96375 TX/PRO/DX INJ NEW DRUG ADDON: CPT

## 2021-04-20 PROCEDURE — 80053 COMPREHEN METABOLIC PANEL: CPT

## 2021-04-20 PROCEDURE — 85730 THROMBOPLASTIN TIME PARTIAL: CPT

## 2021-04-20 PROCEDURE — 6360000004 HC RX CONTRAST MEDICATION: Performed by: NURSE PRACTITIONER

## 2021-04-20 PROCEDURE — 6360000002 HC RX W HCPCS: Performed by: NURSE PRACTITIONER

## 2021-04-20 PROCEDURE — 93005 ELECTROCARDIOGRAM TRACING: CPT | Performed by: NURSE PRACTITIONER

## 2021-04-20 PROCEDURE — 99283 EMERGENCY DEPT VISIT LOW MDM: CPT

## 2021-04-20 PROCEDURE — 6360000002 HC RX W HCPCS: Performed by: INTERNAL MEDICINE

## 2021-04-20 PROCEDURE — 99214 OFFICE O/P EST MOD 30 MIN: CPT | Performed by: NURSE PRACTITIONER

## 2021-04-20 PROCEDURE — 6370000000 HC RX 637 (ALT 250 FOR IP): Performed by: INTERNAL MEDICINE

## 2021-04-20 RX ORDER — HEPARIN SODIUM 10000 [USP'U]/100ML
12.2 INJECTION, SOLUTION INTRAVENOUS CONTINUOUS
Status: DISCONTINUED | OUTPATIENT
Start: 2021-04-20 | End: 2021-04-21

## 2021-04-20 RX ORDER — SODIUM CHLORIDE 0.9 % (FLUSH) 0.9 %
5-40 SYRINGE (ML) INJECTION PRN
Status: DISCONTINUED | OUTPATIENT
Start: 2021-04-20 | End: 2021-04-21 | Stop reason: HOSPADM

## 2021-04-20 RX ORDER — HEPARIN SODIUM 1000 [USP'U]/ML
80 INJECTION, SOLUTION INTRAVENOUS; SUBCUTANEOUS PRN
Status: DISCONTINUED | OUTPATIENT
Start: 2021-04-20 | End: 2021-04-21

## 2021-04-20 RX ORDER — M-VIT,TX,IRON,MINS/CALC/FOLIC 27MG-0.4MG
1 TABLET ORAL DAILY
Status: DISCONTINUED | OUTPATIENT
Start: 2021-04-20 | End: 2021-04-21 | Stop reason: HOSPADM

## 2021-04-20 RX ORDER — MORPHINE SULFATE 4 MG/ML
4 INJECTION, SOLUTION INTRAMUSCULAR; INTRAVENOUS ONCE
Status: COMPLETED | OUTPATIENT
Start: 2021-04-20 | End: 2021-04-20

## 2021-04-20 RX ORDER — SODIUM CHLORIDE 0.9 % (FLUSH) 0.9 %
5-40 SYRINGE (ML) INJECTION EVERY 12 HOURS SCHEDULED
Status: DISCONTINUED | OUTPATIENT
Start: 2021-04-20 | End: 2021-04-21 | Stop reason: HOSPADM

## 2021-04-20 RX ORDER — HEPARIN SODIUM 1000 [USP'U]/ML
80 INJECTION, SOLUTION INTRAVENOUS; SUBCUTANEOUS ONCE
Status: COMPLETED | OUTPATIENT
Start: 2021-04-20 | End: 2021-04-20

## 2021-04-20 RX ORDER — UBIDECARENONE 75 MG
50 CAPSULE ORAL DAILY
Status: DISCONTINUED | OUTPATIENT
Start: 2021-04-21 | End: 2021-04-21 | Stop reason: HOSPADM

## 2021-04-20 RX ORDER — KETOROLAC TROMETHAMINE 30 MG/ML
30 INJECTION, SOLUTION INTRAMUSCULAR; INTRAVENOUS EVERY 6 HOURS PRN
Status: DISCONTINUED | OUTPATIENT
Start: 2021-04-20 | End: 2021-04-21 | Stop reason: HOSPADM

## 2021-04-20 RX ORDER — POLYETHYLENE GLYCOL 3350 17 G/17G
17 POWDER, FOR SOLUTION ORAL DAILY PRN
Status: DISCONTINUED | OUTPATIENT
Start: 2021-04-20 | End: 2021-04-21 | Stop reason: HOSPADM

## 2021-04-20 RX ORDER — SODIUM CHLORIDE 9 MG/ML
25 INJECTION, SOLUTION INTRAVENOUS PRN
Status: DISCONTINUED | OUTPATIENT
Start: 2021-04-20 | End: 2021-04-21 | Stop reason: HOSPADM

## 2021-04-20 RX ORDER — OXYCODONE HYDROCHLORIDE AND ACETAMINOPHEN 5; 325 MG/1; MG/1
1 TABLET ORAL ONCE
Status: COMPLETED | OUTPATIENT
Start: 2021-04-20 | End: 2021-04-20

## 2021-04-20 RX ORDER — HEPARIN SODIUM 1000 [USP'U]/ML
40 INJECTION, SOLUTION INTRAVENOUS; SUBCUTANEOUS PRN
Status: DISCONTINUED | OUTPATIENT
Start: 2021-04-20 | End: 2021-04-21

## 2021-04-20 RX ORDER — PROCHLORPERAZINE EDISYLATE 5 MG/ML
10 INJECTION INTRAMUSCULAR; INTRAVENOUS EVERY 6 HOURS PRN
Status: DISCONTINUED | OUTPATIENT
Start: 2021-04-20 | End: 2021-04-21 | Stop reason: HOSPADM

## 2021-04-20 RX ORDER — CETIRIZINE HYDROCHLORIDE 10 MG/1
10 TABLET ORAL DAILY
Status: DISCONTINUED | OUTPATIENT
Start: 2021-04-20 | End: 2021-04-21 | Stop reason: HOSPADM

## 2021-04-20 RX ORDER — METOPROLOL SUCCINATE 25 MG/1
25 TABLET, EXTENDED RELEASE ORAL DAILY
Status: DISCONTINUED | OUTPATIENT
Start: 2021-04-20 | End: 2021-04-21 | Stop reason: HOSPADM

## 2021-04-20 RX ADMIN — CETIRIZINE HYDROCHLORIDE 10 MG: 10 TABLET, FILM COATED ORAL at 20:59

## 2021-04-20 RX ADMIN — MORPHINE SULFATE 4 MG: 4 INJECTION, SOLUTION INTRAMUSCULAR; INTRAVENOUS at 17:42

## 2021-04-20 RX ADMIN — Medication 10 ML: at 21:03

## 2021-04-20 RX ADMIN — OXYCODONE HYDROCHLORIDE AND ACETAMINOPHEN 1 TABLET: 5; 325 TABLET ORAL at 16:08

## 2021-04-20 RX ADMIN — HEPARIN SODIUM 5440 UNITS: 1000 INJECTION INTRAVENOUS; SUBCUTANEOUS at 17:44

## 2021-04-20 RX ADMIN — HEPARIN SODIUM 12.2 ML/HR: 10000 INJECTION, SOLUTION INTRAVENOUS at 17:44

## 2021-04-20 RX ADMIN — KETOROLAC TROMETHAMINE 30 MG: 30 INJECTION, SOLUTION INTRAMUSCULAR at 20:59

## 2021-04-20 RX ADMIN — IOPAMIDOL 75 ML: 755 INJECTION, SOLUTION INTRAVENOUS at 14:18

## 2021-04-20 RX ADMIN — Medication 1 TABLET: at 20:59

## 2021-04-20 ASSESSMENT — PATIENT HEALTH QUESTIONNAIRE - PHQ9
1. LITTLE INTEREST OR PLEASURE IN DOING THINGS: 0
2. FEELING DOWN, DEPRESSED OR HOPELESS: 0
SUM OF ALL RESPONSES TO PHQ QUESTIONS 1-9: 0
SUM OF ALL RESPONSES TO PHQ9 QUESTIONS 1 & 2: 0

## 2021-04-20 ASSESSMENT — ENCOUNTER SYMPTOMS
COUGH: 1
BACK PAIN: 1
SHORTNESS OF BREATH: 1
APNEA: 0
CHEST TIGHTNESS: 1
GASTROINTESTINAL NEGATIVE: 1
CHOKING: 0
WHEEZING: 0
STRIDOR: 0

## 2021-04-20 ASSESSMENT — PAIN SCALES - GENERAL
PAINLEVEL_OUTOF10: 5
PAINLEVEL_OUTOF10: 2
PAINLEVEL_OUTOF10: 5

## 2021-04-20 ASSESSMENT — PAIN DESCRIPTION - PAIN TYPE: TYPE: ACUTE PAIN

## 2021-04-20 ASSESSMENT — PAIN DESCRIPTION - ORIENTATION: ORIENTATION: RIGHT

## 2021-04-20 NOTE — H&P
Hospital Medicine History & Physical      PCP: JOB Louie CNP    Date of Admission: 4/20/2021    Date of Service: Pt seen/examined on 04/20/21 and Admitted to Inpatient with expected LOS greater than two midnights due to medical therapy. Chief Complaint:  Chest pain, dyspnea    History Of Present Illness: The patient is a pleasant 49 Y F with a h/o HTN and a remote h/o palpitations, for which she takes metoprolol. She is generally healthy, runs 5 miles regularly. Starting about a week ago she noticed more REDDY than usual, then started having some tight chest pain which radiated to her R scapular area. The pain had pleuritic features. She saw her PCP, who ordered a CTPA, and she was found to have acute RLL and RML PE's. She was sent to the ED, where her labs and vitals were reassuring. She does take an estrogen-containing contraceptive, and a week ago she drove back here from [place]. Also of note, she had the J+J vaccine about a month ago. Past Medical History:          Diagnosis Date    GERD (gastroesophageal reflux disease)     Hypertension     PONV (postoperative nausea and vomiting)     Pulmonary embolism ([facility] [address] 04/20/2021    Seasonal allergies        Past Surgical History:          Procedure Laterality Date    BREAST BIOPSY Right 12/13/2017     TWO SITE RIGHT NEEDLE LOCALIZED EXCISIONAL BREAST BIOPSY    COLONOSCOPY N/A 12/7/2020    COLONOSCOPY DIAGNOSTIC performed by Alejandro Lopez MD at [address] [place] [address] Drive EXTRACTION         Medications Prior to Admission:      Prior to Admission medications    Medication Sig Start Date End Date Taking?  Authorizing Provider   metoprolol succinate (TOPROL XL) 25 MG extended release tablet TAKE 1 TABLET BY MOUTH DAILY 11/10/20   JOB Louie CNP   OMEGA 3-6-9 FATTY ACIDS PO Take by mouth    Historical Provider, MD   Multiple Vitamins-Minerals (THERAPEUTIC MULTIVITAMIN-MINERALS) tablet Take 1 tablet by mouth daily    Historical Provider, MD   vitamin B-12 (CYANOCOBALAMIN) 100 MCG tablet Take 50 mcg by mouth daily    Historical Provider, MD   loratadine (CLARITIN) 10 MG tablet Take 10 mg by mouth daily    Historical Provider, MD       Allergies:  Codeine, Decongestant [pseudoephedrine hcl], and Norco [hydrocodone-acetaminophen]    Social History:      The patient currently lives at home    TOBACCO:   reports that she has never smoked. She has never used smokeless tobacco.  ETOH:   reports no history of alcohol use. E-Cigarettes/Vaping Use     Questions Responses    E-Cigarette/Vaping Use Never User    Start Date     Passive Exposure     Quit Date     Counseling Given     Comments             Family History:      Reviewed in detail and negative for ESRD. Positive as follows:        Problem Relation Age of Onset    No Known Problems Mother     Heart Disease Father 52        CAD, hx of CABG    Colon Cancer Brother     No Known Problems Brother     Diabetes Paternal Aunt     Diabetes Paternal Uncle        REVIEW OF SYSTEMS COMPLETED:   Pertinent positives as noted in the HPI. All other systems reviewed and negative. PHYSICAL EXAM PERFORMED:    /78   Pulse 88   Temp 98.1 °F (36.7 °C) (Oral)   Resp 15   Ht 5' 8.5\" (1.74 m)   Wt 150 lb (68 kg)   LMP 03/14/2021 (Approximate)   SpO2 98%   BMI 22.48 kg/m²       General appearance:  No apparent distress, appears stated age and cooperative. HEENT:  Normal cephalic, atraumatic without obvious deformity. Pupils equal, round, and reactive to light. Extra ocular muscles intact. Conjunctivae/corneas clear. Neck: Supple, with full range of motion. No jugular venous distention. Trachea midline. Respiratory:  Normal respiratory effort. Clear to auscultation, bilaterally without Rales/Wheezes/Rhonchi. Cardiovascular:  Regular rate and rhythm with normal S1/S2 without murmurs, rubs or gallops.   Abdomen: Soft, non-tender, non-distended with normal bowel to multiple opioids. Will try short course of NSAID, which will probably be better for pleuritic pain. Otherwise avoid NSAIDs long term in the setting of anticoagulation. HTN  - she can discuss with her PCP whether she really needs the metoprolol anymore. She was put on this when she had palpitations at age 29. She no longer has palpitations. Not the ideal medication for an avid runner, could switch to something else if BP control still needed. Continue antihistamine for seasonal allergies. DVT Prophylaxis: anticoagulation as above  Diet: DIET GENERAL;  Code Status: Prior    PT/OT Eval Status: not indicated    Dispo - likely home 4/21 if still doing well. Ran Snyder MD    Thank you JOB Avilez - SARAI for the opportunity to be involved in this patient's care. If you have any questions or concerns please feel free to contact me at [phone].

## 2021-04-20 NOTE — ED PROVIDER NOTES
I independently performed a history and physical on Nik Hyde. All diagnostic, treatment, and disposition decisions were made by myself in conjunction with the advanced practice provider.     -Nik Hyde is a 49 y.o. female with history of hypertension presents to ED for for finding of PE. Patient states that she had the [employer] shot on 3/12 and soon after drove to [place]. Reports last Wednesday she started to feel like she was getting back spasms that would shoot pain to her right shoulder. Worsened when she lays down or take deep breaths. Also reports chest pain that started last night as well as shortness of breath. Denies lower extremity swelling or history of blood clotting disorder. Patient is on birth control medication. Reports that she saw her PCP today ordered a CT PE scan given all of her risk factors, it came back positive. -PE: well appearing, nontoxic, not in acute distress. RRR, CTAB/l, no w/r/r, abdomen soft, ND, NTTP, + BS x 4, no rigidity, rebound, guarding  -lab workup significant for: Mild leukocytosis of 12.8.  -CT PE: Acute pulmonary embolus. Estimated clot burden is mild to moderate. Airspace disease in the right lower lobe, likely evolving pulmonary infarctions. Pneumonia is in the differential consideration. Small right pleural effusion.  -The Ekg interpreted by me shows  normal sinus rhythm with a rate of 77  Axis is   Normal  QTc is  423  Intervals and Durations are unremarkable. ST Segments: no acute change  No significant change from prior EKG dated 4/20/21  -Patient started high-dose heparin, she understands she will be admitted for further observation for acute PE. Patient was given Percocet for back pain however she states is not helping and therefore was given morphine 4 mg. For further details of [facility] emergency department encounter, please see [facility] Ordonez documentation.         Ema Siemens, MD  04/20/21 1079

## 2021-04-20 NOTE — CONSULTS
Pharmacy to Manage Heparin Infusion per [facility]    Dx: DVT/PE  Pt wt = 68 Kg  Baseline aPTT = 25.0 sec    High Dose Heparin Infusion  Heparin 80 units/kg IVP bolus followed by Heparin infusion at 18 units/kg/hr (recommended initial max dose 2100 units/hr). Recheck aPTT in 6 hours at 2330. Goal aPTT = 49-76 seconds. aPTT < 36.3 Heparin 80 units/kg bolus  Increase infusion by 4 units/kg/hr  aPTT 36.3 - 48.9   Heparin 40 units/kg bolus Increase infusion by 2 units/kg/hr  aPTT 49 - 76 No bolus No change   aPTT 76.1 - 85   No bolus Decrease infusion by 2 units/kg/hr  aPTT 85.1 - 94    Hold heparin for 1 hour Decrease infusion by 3 units/kg/hr  aPTT 94.1 - 103  Hold heparin for 1 hour Decrease infusion by 4 units/kg/hr  aPTT > 103 Hold heparin for 1 hour Decrease infusion by 6 units/kg/hr    Obtain aPTT 6 hours after bolus and 6 hours after any dose change until two consecutive therapeutic aPTT are achieved- then daily. Chemo Gamboa PharmD  4/20/2021 at 5:03 PM     4/21 0042  aptt = 60.9 sec  Continue current rate. Next aptt 0700  Jay Lindsey Pharm D.4/21/2021 2:29 AM      4/21  APTT= 50.9 sec  - No changes needed   - Continue infusion at current rate of 12.2ml/hr  - Daily aPTT.   Leilani Hastings/[facility]. 4/21/21 9:13 AM EDT

## 2021-04-20 NOTE — TELEPHONE ENCOUNTER
[facility] is asking if Carlos Boxer can send over orders for pt to get a BUN and Creatinine done. Pt is there now.  Please Advise

## 2021-04-20 NOTE — ED PROVIDER NOTES
Evaluated by Advanced Practice Provider    [facility]  ED      CHIEF COMPLAINT  Pulmonary Embolism (Had OP CT done today. Pt had J&J COVID vaccine on 3/12 and then drove 16 hours to [place] 3/28 and drove back afterwards.  )    HISTORY OF PRESENT ILLNESS  Sarina Pierce is a 49 y.o. female who presents to the ED complaining of known PE. Last Wednesday she thought she was having back spasm. As week progressed it was OK but over past couple of days pain in her shoulder, pain in her back and chest, and SOB. Reports at night it is unbearable, symptoms worse to lay down. Went to PCP today and sent for CT of chest. Had J&J COVID vaccine on 3/12. She drove to [place] 3 weeks later, stayed a week and drove back. No history of blood clots in the past. Does take estrogen. Had outpatient CT that was positive for PE today and sent to ER. She runs 5 miles a day typically, has had difficulty last couple days. States yesterday she felt like her sports bra was on to tight, chest was tight and heavy. The patient is currently rating their pain as 4/10 and describes it as a aching, tight type of pain. Treatments tried prior to arrival in the ED include: none. The patient arrived to the ED via private car.     PAST MEDICAL HISTORY    Past Medical History:   Diagnosis Date    GERD (gastroesophageal reflux disease)     Hypertension     PONV (postoperative nausea and vomiting)     Pulmonary embolism ([facility] [address] 04/20/2021    Seasonal allergies        SURGICAL HISTORY    Past Surgical History:   Procedure Laterality Date    BREAST BIOPSY Right 12/13/2017     TWO SITE RIGHT NEEDLE LOCALIZED EXCISIONAL BREAST BIOPSY    COLONOSCOPY N/A 12/7/2020    COLONOSCOPY DIAGNOSTIC performed by Rodolfo Fung MD at [facility] [place] EXTRACTION         CURRENT MEDICATIONS    Current Outpatient Rx   Medication Sig Dispense Refill    metoprolol succinate (TOPROL XL) 25 MG extended release tablet TAKE 1 TABLET BY MOUTH DAILY 90 tablet 1    LO LOESTRIN FE 1 MG-10 MCG / 10 MCG tablet TK 1 T PO QD      OMEGA 3-6-9 FATTY ACIDS PO Take by mouth      Multiple Vitamins-Minerals (THERAPEUTIC MULTIVITAMIN-MINERALS) tablet Take 1 tablet by mouth daily      vitamin B-12 (CYANOCOBALAMIN) 100 MCG tablet Take 50 mcg by mouth daily      loratadine (CLARITIN) 10 MG tablet Take 10 mg by mouth daily         ALLERGIES    Allergies   Allergen Reactions    Codeine Other (See Comments)     REDNESS IN NECK TO FACE    Decongestant [Pseudoephedrine Hcl]      Nausea, increased heart rate    Norco [Hydrocodone-Acetaminophen] Hives       FAMILY HISTORY    Family History   Problem Relation Age of Onset    No Known Problems Mother     Heart Disease Father 52        CAD, hx of CABG    Colon Cancer Brother     No Known Problems Brother     Diabetes Paternal Aunt     Diabetes Paternal Uncle        SOCIAL HISTORY    Social History     Socioeconomic History    Marital status:      Spouse name: None    Number of children: 1    Years of education: None    Highest education level: None   Occupational History    None   Social Needs    Financial resource strain: Not very hard    Food insecurity     Worry: Never true     Inability: Never true    Transportation needs     Medical: No     Non-medical: No   Tobacco Use    Smoking status: Never Smoker    Smokeless tobacco: Never Used   Substance and Sexual Activity    Alcohol use: No    Drug use: No    Sexual activity: Yes     Partners: Male     Birth control/protection: Pill   Lifestyle    Physical activity     Days per week: 6 days     Minutes per session: 120 min    Stress:  Only a little   Relationships    Social connections     Talks on phone: None     Gets together: None     Attends Sikh service: None     Active member of club or organization: None     Attends meetings of clubs or organizations: None     Relationship status: None    Intimate partner violence     Fear of current or ex partner: None     Emotionally abused: None     Physically abused: None     Forced sexual activity: None   Other Topics Concern    None   Social History Narrative    None       REVIEW OF SYSTEMS    10 systems reviewed, pertinent positives per HPI otherwise noted to be negative    PHYSICAL EXAM  Vitals:    04/20/21 1607   BP: 121/78   Pulse: 88   Resp: 15   Temp:    SpO2: 98%       GENERAL: Patient is well-developed, well-nourished. Awake and alert. Cooperative. Resting in bed. No apparent distress. HEENT:  Normocephalic, atraumatic. Conjunctiva appear normal. Sclera is non-icteric. External ears are normal.    NECK: Supple with normal ROM. Trachea midline. LUNGS: Equal and symmetric chest rise. Breathing is unlabored. Speaking comfortably in full sentences. Lungs are clear bilaterally to auscultation. Without wheezing, rales, or rhonchi. CADIOVASCULAR:  Regular rate and rhythm. Normal S1-S2 sounds. No murmurs, rubs, or gallops. Capillary refill is brisk in all 4 extremities. Bilateral lower extremities are equal in size, there is no swelling observed. There is no tenderness to palpation. There is no erythema observed or warmth palpated. GI: Soft, nontender, nondistended with positive bowel sounds. No rebound tenderness, guarding or any peritoneal signs. No masses or hepatosplenomegaly    MUSCULOSKELETAL:  No gross deformities or trauma noted. Moving all extremities equally and appropriately. Normal ROM. SKIN: Warm/dry. Skin is intact. No rashes/lesions noted. PSYCHIATRIC: Mood and affect appropriate. Speech is clear and articulate. NEUROLOGIC: Alert and oriented. No focal motor or sensory deficits. LABS  I have reviewed all labs for this visit.    Results for orders placed or performed during the hospital encounter of 04/20/21   CBC auto differential   Result Value Ref Range    WBC 12.8 (H) 4.0 - 11.0 K/uL    RBC 4.03 4.00 - 5.20 M/uL    Hemoglobin 12.8 12.0 - 16.0 g/dL Hematocrit 38.0 36.0 - 48.0 %    MCV 94.3 80.0 - 100.0 fL    MCH 31.8 26.0 - 34.0 pg    MCHC 33.7 31.0 - 36.0 g/dL    RDW 12.6 12.4 - 15.4 %    Platelets 176 977 - 368 K/uL    MPV 8.5 5.0 - 10.5 fL    Neutrophils % 78.5 %    Lymphocytes % 13.0 %    Monocytes % 8.0 %    Eosinophils % 0.2 %    Basophils % 0.3 %    Neutrophils Absolute 10.1 (H) 1.7 - 7.7 K/uL    Lymphocytes Absolute 1.7 1.0 - 5.1 K/uL    Monocytes Absolute 1.0 0.0 - 1.3 K/uL    Eosinophils Absolute 0.0 0.0 - 0.6 K/uL    Basophils Absolute 0.0 0.0 - 0.2 K/uL   Comprehensive metabolic panel   Result Value Ref Range    Sodium 136 136 - 145 mmol/L    Potassium 3.8 3.5 - 5.1 mmol/L    Chloride 101 99 - 110 mmol/L    CO2 24 21 - 32 mmol/L    Anion Gap 11 3 - 16    Glucose 88 70 - 99 mg/dL    BUN 13 7 - 20 mg/dL    CREATININE 0.7 0.6 - 1.1 mg/dL    GFR Non-African American >60 >60    GFR African American >60 >60    Calcium 9.8 8.3 - 10.6 mg/dL    Total Protein 7.7 6.4 - 8.2 g/dL    Albumin 4.3 3.4 - 5.0 g/dL    Albumin/Globulin Ratio 1.3 1.1 - 2.2    Total Bilirubin 0.5 0.0 - 1.0 mg/dL    Alkaline Phosphatase 55 40 - 129 U/L    ALT 11 10 - 40 U/L    AST 16 15 - 37 U/L    Globulin 3.4 g/dL   APTT   Result Value Ref Range    aPTT 25.0 24.2 - 36.2 sec   Protime-INR   Result Value Ref Range    Protime 12.1 10.0 - 13.2 sec    INR 1.04 0.86 - 1.14   Sample possible blood bank testing   Result Value Ref Range    Specimen Status LINA    Troponin   Result Value Ref Range    Troponin <0.01 <0.01 ng/mL   EKG 12 Lead   Result Value Ref Range    Ventricular Rate 77 BPM    Atrial Rate 77 BPM    P-R Interval 106 ms    QRS Duration 72 ms    Q-T Interval 374 ms    QTc Calculation (Bazett) 423 ms    P Axis 55 degrees    R Axis 70 degrees    T Axis 36 degrees    Diagnosis       Sinus rhythm with short PROtherwise normal ECGWhen compared with ECG of 08-DEC-2017 10:34,SC interval has decreased       RADIOLOGY    Ct Chest Pulmonary Embolism W Contrast    Result Date: 4/20/2021  EXAMINATION: CTA OF THE CHEST 4/20/2021 2:09 pm TECHNIQUE: CTA of the chest was performed after the administration of intravenous contrast.  Multiplanar reformatted images are provided for review. MIP images are provided for review. Dose modulation, iterative reconstruction, and/or weight based adjustment of the mA/kV was utilized to reduce the radiation dose to as low as reasonably achievable. COMPARISON: None. HISTORY: ORDERING SYSTEM PROVIDED HISTORY: Chest tightness TECHNOLOGIST PROVIDED HISTORY: Reason for Exam: mid back pain x 2 days-sob since sunday-chest tightness Acuity: Acute Type of Exam: Initial Additional signs and symptoms: non-smoker Relevant Medical/Surgical History: no surg FINDINGS: Pulmonary Arteries: The central pulmonary arteries are normal in caliber. There is acute thrombus in the right descending pulmonary artery, extending into multiple lobar and segmental branches of the right middle and lower lobes. There is scattered thrombus otherwise noted in segmental or subsegmental branches, including the left lower lobe. Mediastinum: The right and left ventricular volumes are symmetrical.  There is no significant mass effect on the intraventricular septum. Thoracic aorta is normal in caliber. No mediastinal or hilar adenopathy. Lungs/pleura: Airspace disease is noted in the right lower lobe, greatest in the basal segments. There is a small right pleural effusion. Upper Abdomen: Limited images of the upper abdomen are unremarkable. Soft Tissues/Bones: No acute bone or soft tissue abnormality. Acute pulmonary embolus. The estimated clot burden is mild-to-moderate. Airspace disease in the right lower lobe, likely evolving pulmonary infarctions. Pneumonia is a differential consideration. Small right pleural effusion. Critical results were called by Dr. Edgardo Hernandez MD to Elver Gallardo on 4/20/2021 at 14:41. ED COURSE/MDM  Patient seen and evaluated. Old records reviewed.

## 2021-04-20 NOTE — PROGRESS NOTES
2021     Ernie Hammer (:  1970) is a 49 y.o. female, here for evaluation of the following medical concerns:    HPI  Pt c/o right sided rib/back pain that radiates to her right shoulder. Also c/o chest tightness, dry cough and SOB. Symptoms started 6 days ago. No known cause. Denies fever or chills. Pt rec'd the J&J COVID vaccine 3/12/2021. Denies calf pain, swelling or redness. Did drive 17 hours to and from [place] 2021. Pt is also on oral BCP's. Review of Systems   Constitutional: Negative. HENT: Negative. Respiratory: Positive for cough, chest tightness and shortness of breath. Negative for apnea, choking, wheezing and stridor. Cardiovascular: Negative. Gastrointestinal: Negative. Musculoskeletal: Positive for back pain. Skin: Negative. Neurological: Negative. Prior to Visit Medications    Medication Sig Taking? Authorizing Provider   metoprolol succinate (TOPROL XL) 25 MG extended release tablet TAKE 1 TABLET BY MOUTH DAILY Yes Lloyd Mac APRN - CNP   LO LOESTRIN FE 1 MG-10 MCG / 10 MCG tablet TK 1 T PO QD Yes Historical Provider, MD Bennett Silbao 3-6-9 FATTY ACIDS PO Take by mouth Yes Historical Provider, MD   Multiple Vitamins-Minerals (THERAPEUTIC MULTIVITAMIN-MINERALS) tablet Take 1 tablet by mouth daily Yes Historical Provider, MD   vitamin B-12 (CYANOCOBALAMIN) 100 MCG tablet Take 50 mcg by mouth daily Yes Historical Provider, MD   loratadine (CLARITIN) 10 MG tablet Take 10 mg by mouth daily Yes Historical Provider, MD        Social History     Tobacco Use    Smoking status: Never Smoker    Smokeless tobacco: Never Used   Substance Use Topics    Alcohol use: No        Vitals:    21 1021   BP: 110/62   Site: Right Upper Arm   Position: Sitting   Cuff Size: Medium Adult   Pulse: 71   SpO2: 97%     Estimated body mass index is 22.81 kg/m² as calculated from the following:    Height as of 20: 5' 8\" (1.727 m).     Weight as of 12/7/20: 150 lb (68 kg). Physical Exam  Vitals signs reviewed. Constitutional:       General: She is not in acute distress. Appearance: Normal appearance. She is normal weight. She is not ill-appearing, toxic-appearing or diaphoretic. HENT:      Head: Normocephalic. Neck:      Musculoskeletal: Normal range of motion. Cardiovascular:      Rate and Rhythm: Normal rate and regular rhythm. Heart sounds: Normal heart sounds. Pulmonary:      Effort: Pulmonary effort is normal.      Breath sounds: Normal breath sounds. Skin:     General: Skin is warm and dry. Neurological:      Mental Status: She is alert. ASSESSMENT/PLAN:  1. Chest tightness  See HPI. Given pt's h/o BCP's, recent COVID vaccine and recent long distance travel will order STAT CTA chest to r/o PE. Will also check troponin and EKG. - CTA PULMONARY W CONTRAST  - EKG 12 Lead  - TROPONIN    2. SOB (shortness of breath)  See #1  - CTA PULMONARY W CONTRAST  - EKG 12 Lead  - TROPONIN      Return if symptoms worsen or fail to improve. An electronic signature was used to authenticate this note.     --JOB Chamberlain - CNP on 4/20/2021 at 10:42 AM

## 2021-04-21 ENCOUNTER — TELEPHONE (OUTPATIENT)
Dept: FAMILY MEDICINE CLINIC | Age: 51
End: 2021-04-21

## 2021-04-21 VITALS
DIASTOLIC BLOOD PRESSURE: 65 MMHG | WEIGHT: 150 LBS | SYSTOLIC BLOOD PRESSURE: 104 MMHG | HEART RATE: 75 BPM | TEMPERATURE: 98.2 F | OXYGEN SATURATION: 97 % | RESPIRATION RATE: 18 BRPM | HEIGHT: 68 IN | BODY MASS INDEX: 22.73 KG/M2

## 2021-04-21 DIAGNOSIS — I26.99 PULMONARY EMBOLISM AND INFARCTION (HCC): Primary | ICD-10-CM

## 2021-04-21 LAB
APTT: 50.9 SEC (ref 24.2–36.2)
APTT: 60.9 SEC (ref 24.2–36.2)
EKG ATRIAL RATE: 77 BPM
EKG DIAGNOSIS: NORMAL
EKG P AXIS: 55 DEGREES
EKG P-R INTERVAL: 106 MS
EKG Q-T INTERVAL: 374 MS
EKG QRS DURATION: 72 MS
EKG QTC CALCULATION (BAZETT): 423 MS
EKG R AXIS: 70 DEGREES
EKG T AXIS: 36 DEGREES
EKG VENTRICULAR RATE: 77 BPM

## 2021-04-21 PROCEDURE — 6370000000 HC RX 637 (ALT 250 FOR IP): Performed by: INTERNAL MEDICINE

## 2021-04-21 PROCEDURE — 36415 COLL VENOUS BLD VENIPUNCTURE: CPT

## 2021-04-21 PROCEDURE — 93010 ELECTROCARDIOGRAM REPORT: CPT | Performed by: INTERNAL MEDICINE

## 2021-04-21 PROCEDURE — 2580000003 HC RX 258: Performed by: INTERNAL MEDICINE

## 2021-04-21 PROCEDURE — 85730 THROMBOPLASTIN TIME PARTIAL: CPT

## 2021-04-21 PROCEDURE — 6360000002 HC RX W HCPCS: Performed by: INTERNAL MEDICINE

## 2021-04-21 RX ORDER — TRAMADOL HYDROCHLORIDE 50 MG/1
50 TABLET ORAL EVERY 4 HOURS PRN
Qty: 18 TABLET | Refills: 0 | Status: SHIPPED | OUTPATIENT
Start: 2021-04-21 | End: 2021-04-24

## 2021-04-21 RX ADMIN — Medication 1 TABLET: at 08:04

## 2021-04-21 RX ADMIN — KETOROLAC TROMETHAMINE 30 MG: 30 INJECTION, SOLUTION INTRAMUSCULAR at 06:41

## 2021-04-21 RX ADMIN — CETIRIZINE HYDROCHLORIDE 10 MG: 10 TABLET, FILM COATED ORAL at 08:04

## 2021-04-21 RX ADMIN — Medication 10 ML: at 08:04

## 2021-04-21 RX ADMIN — APIXABAN 10 MG: 5 TABLET, FILM COATED ORAL at 11:05

## 2021-04-21 ASSESSMENT — PAIN SCALES - GENERAL
PAINLEVEL_OUTOF10: 0
PAINLEVEL_OUTOF10: 0

## 2021-04-21 NOTE — DISCHARGE SUMMARY
Hospital Medicine Discharge Summary    Patient ID: Catia Sorto      Patient's PCP: JOB Ocasio CNP    Admit Date: 4/20/2021     Discharge Date:   04/21/21     Admitting Physician: Amie Cohen MD     Discharge Physician: Aby Lopez MD     Discharge Diagnoses: Active Hospital Problems    Diagnosis    Pulmonary embolism ([facility] [address] [I26.99]    Pulmonary embolism and infarction ([facility] [address] [I26.99]       The patient was seen and examined on day of discharge and this discharge summary is in conjunction with any daily progress note from day of discharge. Hospital Course: The patient is a pleasant 49 Y F with a h/o HTN and a remote h/o palpitations, for which she takes metoprolol. She is generally healthy, runs 5 miles regularly. Starting about a week ago she noticed more REDDY than usual, then started having some tight chest pain which radiated to her R scapular area. The pain had pleuritic features. She saw her PCP, who ordered a CTPA, and she was found to have acute RLL and RML PE's. She was sent to the ED, where her labs and vitals were reassuring. She does take an estrogen-containing contraceptive, and a week ago she drove back here from [place]. Also of note, she had the J+J vaccine about a month ago.       Acute, provoked PE's, with pulmonary infarct  - heparin gtt initially. Will discharge with apixaban. Discussed risks and options in detail. She should be anticoagulated for 6 months. - venous doppler and TTE would not   - stop current contraceptive pill. She would be able to take a progesterone-only pill, such as norethindrone, but she was thinking of stopping contraception anyway. She will follow up with her gynecologist.    - she will need routine cancer screening via her PCP. - analgesia. She had allergic reactions to multiple opioids. Will try short course of NSAID, which will probably be better for pleuritic pain.   Otherwise avoid NSAIDs long term in the setting of anticoagulation.      HTN  - she can discuss with her PCP whether she really needs the metoprolol anymore. She was put on this when she had palpitations at age 29. She no longer has palpitations. Not the ideal medication for an avid runner, could switch to something else if BP control still needed.     Continue antihistamine for seasonal allergies. Physical Exam Performed:     /65   Pulse 75   Temp 98.2 °F (36.8 °C) (Oral)   Resp 18   Ht 5' 8\" (1.727 m)   Wt 150 lb (68 kg)   LMP 03/14/2021 (Approximate)   SpO2 97%   BMI 22.81 kg/m²       General appearance:  No apparent distress, appears stated age and cooperative. HEENT:  Normal cephalic, atraumatic without obvious deformity. Pupils equal, round, and reactive to light. Extra ocular muscles intact. Conjunctivae/corneas clear. Neck: Supple, with full range of motion. No jugular venous distention. Trachea midline. Respiratory:  Normal respiratory effort. Clear to auscultation, bilaterally without Rales/Wheezes/Rhonchi. Cardiovascular:  Regular rate and rhythm with normal S1/S2 without murmurs, rubs or gallops. Abdomen: Soft, non-tender, non-distended with normal bowel sounds. Musculoskeletal:  No clubbing, cyanosis or edema bilaterally. Full range of motion without deformity. Skin: Skin color, texture, turgor normal.  No rashes or lesions. Neurologic:  Neurovascularly intact without any focal sensory/motor deficits. Cranial nerves: II-XII intact, grossly non-focal.  Psychiatric:  Alert and oriented, thought content appropriate, normal insight  Capillary Refill: Brisk,3 seconds, normal  Peripheral Pulses: +2 palpable, equal bilaterally       Labs:  For convenience and continuity at follow-up the following most recent labs are provided:      CBC:    Lab Results   Component Value Date    WBC 12.8 04/20/2021    HGB 12.8 04/20/2021    HCT 38.0 04/20/2021     04/20/2021       Renal:    Lab Results   Component Value Date     04/20/2021    K 3.8 04/20/2021     04/20/2021    CO2 24 04/20/2021    BUN 13 04/20/2021    CREATININE 0.7 04/20/2021    CALCIUM 9.8 04/20/2021    PHOS 3.2 06/06/2019         Significant Diagnostic Studies    Radiology:   No orders to display          Consults:     IP CONSULT TO HOSPITALIST    Disposition:  home     Condition at Discharge: Stable    Discharge Instructions/Follow-up:  Follow up with PCP within a month or so. Code Status:  Full Code     Activity: activity as tolerated    Diet: regular diet      Discharge Medications:     Current Discharge Medication List           Details   apixaban starter pack (ELIQUIS) 5 MG TBPK tablet Take 1 tablet by mouth See Admin Instructions 10 mg twice per day for 7 days, then 5 mg twice per day for 6 months  Qty: 74 tablet, Refills: 5              Details   metoprolol succinate (TOPROL XL) 25 MG extended release tablet TAKE 1 TABLET BY MOUTH DAILY  Qty: 90 tablet, Refills: 1    Comments: **Patient requests 90 days supply**  Associated Diagnoses: Essential hypertension      OMEGA 3-6-9 FATTY ACIDS PO Take by mouth      Multiple Vitamins-Minerals (THERAPEUTIC MULTIVITAMIN-MINERALS) tablet Take 1 tablet by mouth daily      vitamin B-12 (CYANOCOBALAMIN) 100 MCG tablet Take 50 mcg by mouth daily      loratadine (CLARITIN) 10 MG tablet Take 10 mg by mouth daily               Time Spent on discharge is more than 30 minutes in the examination, evaluation, counseling and review of medications and discharge plan. Signed:    Lo Joshi MD   4/21/2021      Thank you JOB Kevin CNP for the opportunity to be involved in this patient's care. If you have any questions or concerns please feel free to contact me at [phone].

## 2021-04-21 NOTE — TELEPHONE ENCOUNTER
Pt. Admitted to hospital yesterday for pulmonary embolism. Was released about 1 hr ago and is having pain under her right rib cage area. States they were giving her pain meds in the hospital but only sent her home with Advil for the pain. Lying down makes the pain worse. Pt. Asking if there is something you will order for the pain.

## 2021-04-21 NOTE — CARE COORDINATION
Chart reviewed by . Triggered by age and diagnosis. Pt on b3 at this time. Being evaluated for PE. Heparin gtt infusing. Pt from home, has insurance and PCP. CM does not anticipate needs at discharge. Please consult CM team if needs arise.

## 2021-04-21 NOTE — TELEPHONE ENCOUNTER
Pt states that she was given Percocet for pain but it was not helping so they gave her Morphine. She also stated, \"they had given me something that started w/ T but I cannot remember what it was .

## 2021-04-21 NOTE — TELEPHONE ENCOUNTER
Can you please ask her what they were giving her? I couldn't find it in the notes. They did document that they wanted her to take Advil, but for only a short term since she was on anticoagulation therapy. Her records indicate that she is allergic to codeine and Vicodin.     Thanks It looks like he is scheduled for a follow-up with cardiology on 9/4/20 so I don't need to see him.

## 2021-04-22 ENCOUNTER — TELEPHONE (OUTPATIENT)
Dept: FAMILY MEDICINE CLINIC | Age: 51
End: 2021-04-22

## 2021-04-24 ENCOUNTER — TELEPHONE (OUTPATIENT)
Dept: FAMILY MEDICINE CLINIC | Age: 51
End: 2021-04-24

## 2021-04-24 RX ORDER — BUTALBITAL, ACETAMINOPHEN AND CAFFEINE 50; 325; 40 MG/1; MG/1; MG/1
1-2 TABLET ORAL EVERY 6 HOURS PRN
Qty: 30 TABLET | Refills: 0 | Status: SHIPPED | OUTPATIENT
Start: 2021-04-24 | End: 2021-05-07 | Stop reason: SDUPTHER

## 2021-04-24 RX ORDER — ONDANSETRON 4 MG/1
4 TABLET, FILM COATED ORAL EVERY 8 HOURS PRN
Qty: 15 TABLET | Refills: 0 | Status: SHIPPED | OUTPATIENT
Start: 2021-04-24 | End: 2021-04-29

## 2021-04-24 NOTE — TELEPHONE ENCOUNTER
Patient calling complaining of headache only when she gets up in the morning. Pain occurs where she has been laying, usually the back of the head. She complains of nausea, which seems to be getting worse. Recently in the hospital for PE, started on Eliquis. She is wondering if it might be from the new medication. No other symptoms. No history of migraines, only minor self-limiting headaches occasionally in the past. She is requesting something to help with nausea. I sent in Zofran to take prn for nausea, also sent in some Fioricet to see if it helps with headaches (safe to take with Eliquis). She already has hospital follow up appointment scheduled on Tuesday, which she will attend.

## 2021-04-27 ENCOUNTER — OFFICE VISIT (OUTPATIENT)
Dept: FAMILY MEDICINE CLINIC | Age: 51
End: 2021-04-27
Payer: OTHER GOVERNMENT

## 2021-04-27 ENCOUNTER — HOSPITAL ENCOUNTER (OUTPATIENT)
Dept: CT IMAGING | Age: 51
Discharge: HOME | End: 2021-04-27
Payer: OTHER GOVERNMENT

## 2021-04-27 VITALS
HEIGHT: 68 IN | RESPIRATION RATE: 16 BRPM | DIASTOLIC BLOOD PRESSURE: 74 MMHG | OXYGEN SATURATION: 98 % | BODY MASS INDEX: 22.81 KG/M2 | SYSTOLIC BLOOD PRESSURE: 120 MMHG | HEART RATE: 64 BPM

## 2021-04-27 DIAGNOSIS — R51.9 ACUTE INTRACTABLE HEADACHE, UNSPECIFIED HEADACHE TYPE: ICD-10-CM

## 2021-04-27 DIAGNOSIS — Z09 HOSPITAL DISCHARGE FOLLOW-UP: Primary | ICD-10-CM

## 2021-04-27 PROCEDURE — 70450 CT HEAD/BRAIN W/O DYE: CPT

## 2021-04-27 PROCEDURE — 99495 TRANSJ CARE MGMT MOD F2F 14D: CPT | Performed by: NURSE PRACTITIONER

## 2021-04-27 PROCEDURE — 1111F DSCHRG MED/CURRENT MED MERGE: CPT | Performed by: NURSE PRACTITIONER

## 2021-04-27 ASSESSMENT — ENCOUNTER SYMPTOMS
RESPIRATORY NEGATIVE: 1
GASTROINTESTINAL NEGATIVE: 1

## 2021-04-27 NOTE — PROGRESS NOTES
Post-Discharge Transitional Care Management Services or Hospital Follow Up      Rocío Gomes   YOB: 1970    Date of Office Visit:  4/27/2021  Date of Hospital Admission: 4/20/21  Date of Hospital Discharge: 4/21/21  Readmission Risk Score(high >=14%.  Medium >=10%):Readmission Risk Score: 7      Care management risk score Rising risk (score 2-5) and Complex Care (Scores >=6): 0     Non face to face  following discharge, date last encounter closed (first attempt may have been earlier): 4/22/2021  9:29 AM 4/22/2021  9:29 AM    Call initiated 2 business days of discharge: Yes     Patient Active Problem List   Diagnosis    Essential hypertension    Right upper quadrant abdominal pain    Abnormal mammogram    Pulmonary embolism ([address]    Pulmonary embolism and infarction ([address]       Allergies   Allergen Reactions    Codeine Other (See Comments)     REDNESS IN NECK TO FACE    Decongestant [Pseudoephedrine Hcl]      Nausea, increased heart rate    Norco [Hydrocodone-Acetaminophen] Hives    Tramadol Nausea Only and Anxiety       Medications listed as ordered at the time of discharge from hospital   Batsheva MURPHY   Home Medication Instructions CYNDIE:    Printed on:04/27/21 3200   Medication Information                      apixaban starter pack (ELIQUIS) 5 MG TBPK tablet  Take 1 tablet by mouth See Admin Instructions 10 mg twice per day for 7 days, then 5 mg twice per day for 6 months             butalbital-acetaminophen-caffeine (FIORICET, ESGIC) -40 MG per tablet  Take 1-2 tablets by mouth every 6 hours as needed for Headaches             loratadine (CLARITIN) 10 MG tablet  Take 10 mg by mouth daily             metoprolol succinate (TOPROL XL) 25 MG extended release tablet  TAKE 1 TABLET BY MOUTH DAILY             Multiple Vitamins-Minerals (THERAPEUTIC MULTIVITAMIN-MINERALS) tablet  Take 1 tablet by mouth daily             OMEGA 3-6-9 FATTY ACIDS PO  Take by mouth ondansetron (ZOFRAN) 4 MG tablet  Take 1 tablet by mouth every 8 hours as needed for Nausea             vitamin B-12 (CYANOCOBALAMIN) 100 MCG tablet  Take 50 mcg by mouth daily                   Medications marked \"taking\" at this time  Outpatient Medications Marked as Taking for the 4/27/21 encounter (Office Visit) with JOB Herrera - CNP   Medication Sig Dispense Refill    ondansetron (ZOFRAN) 4 MG tablet Take 1 tablet by mouth every 8 hours as needed for Nausea 15 tablet 0    butalbital-acetaminophen-caffeine (FIORICET, ESGIC) -40 MG per tablet Take 1-2 tablets by mouth every 6 hours as needed for Headaches 30 tablet 0    apixaban starter pack (ELIQUIS) 5 MG TBPK tablet Take 1 tablet by mouth See Admin Instructions 10 mg twice per day for 7 days, then 5 mg twice per day for 6 months 74 tablet 5    metoprolol succinate (TOPROL XL) 25 MG extended release tablet TAKE 1 TABLET BY MOUTH DAILY 90 tablet 1    OMEGA 3-6-9 FATTY ACIDS PO Take by mouth      Multiple Vitamins-Minerals (THERAPEUTIC MULTIVITAMIN-MINERALS) tablet Take 1 tablet by mouth daily      vitamin B-12 (CYANOCOBALAMIN) 100 MCG tablet Take 50 mcg by mouth daily      loratadine (CLARITIN) 10 MG tablet Take 10 mg by mouth daily          Medications patient taking as of now reconciled against medications ordered at time of hospital discharge: Yes    Chief Complaint   Patient presents with    Follow-Up from Hospital       HPI    Inpatient course: Discharge summary reviewed- see chart. Interval history/Current status:     Acute, provoked PE's, with pulmonary infarct  - heparin gtt initially.  Will discharge with apixaban.  Discussed risks and options in detail.  She should be anticoagulated for 6 months.   - venous doppler and TTE would not   - stop current contraceptive pill.  She would be able to take a progesterone-only pill, such as norethindrone, but she was thinking of stopping contraception anyway. Sachin Lee will Constitutional:       Appearance: Normal appearance. She is normal weight. HENT:      Head: Normocephalic. Eyes:      Extraocular Movements: Extraocular movements intact. Conjunctiva/sclera: Conjunctivae normal.      Pupils: Pupils are equal, round, and reactive to light. Neck:      Musculoskeletal: Normal range of motion. Cardiovascular:      Rate and Rhythm: Normal rate and regular rhythm. Pulses: Normal pulses. Heart sounds: Normal heart sounds. Pulmonary:      Effort: Pulmonary effort is normal.      Breath sounds: Normal breath sounds. Chest:      Chest wall: No tenderness. Skin:     General: Skin is warm and dry. Neurological:      Mental Status: She is alert and oriented to person, place, and time. Cranial Nerves: Cranial nerves are intact. Sensory: Sensation is intact. Motor: Motor function is intact. Coordination: Coordination is intact. Gait: Gait is intact. Assessment/Plan:  1. Hospital discharge follow-up  Continue Eliquis as prescribed. No further labs necessary since PE was likely provoked by long distance travel, recent J&J vaccine and taking BCP's. Recommended f/u in 3 months for a check up or sooner if necessary.   - [place] DISCHARGE MEDS RECONCILED W/ CURRENT OUTPATIENT MED LIST    2. Acute intractable headache, unspecified headache type  See HPI. Pt described the headache as the \"worst headache ever\". Headache occurred when she initially woke up and also c/o nausea. Fioricet relieves the headache.   Will do a STAT head CT and f/u with the pt regarding the results.    - STAT head CT        Medical Decision Making: moderate complexity

## 2021-04-30 ENCOUNTER — TELEPHONE (OUTPATIENT)
Dept: FAMILY MEDICINE CLINIC | Age: 51
End: 2021-04-30

## 2021-05-01 NOTE — TELEPHONE ENCOUNTER
Franky Lynn called the on call provider with complaints of symptoms similar that she felt a week and half ago when diagnosed with a PE. Discomfort in the mid section of her right side. Lower right side soreness. She is currently on Eliquis 5 mg BID. States this feels like back spasms in her \" mid section\" She is getting a sharp pain in her side. She states she feels like she is having palpitations and getting a little painful. Advised if she is having new or worse pain to go to the ER. She is going to take her Fioricet to see if this helps her discomfort and then if still feeling off may follow up in the ER. She verbalized understanding of blood clot symptoms.

## 2021-05-04 DIAGNOSIS — I10 ESSENTIAL HYPERTENSION: ICD-10-CM

## 2021-05-04 RX ORDER — METOPROLOL SUCCINATE 25 MG/1
TABLET, EXTENDED RELEASE ORAL
Qty: 30 TABLET | Refills: 1 | Status: SHIPPED | OUTPATIENT
Start: 2021-05-04 | End: 2021-07-02

## 2021-05-04 NOTE — TELEPHONE ENCOUNTER
.  Last office visit 4/27/2021     Last written 11- 90 with 1      Next office visit scheduled Visit date not found    Requested Prescriptions     Pending Prescriptions Disp Refills    metoprolol succinate (TOPROL XL) 25 MG extended release tablet [Pharmacy Med Name: METOPROLOL ER SUCCINATE 25MG TABS] 30 tablet 1     Sig: TAKE 1 TABLET BY MOUTH EVERY DAY

## 2021-05-07 ENCOUNTER — HOSPITAL ENCOUNTER (OUTPATIENT)
Dept: VASCULAR LAB | Age: 51
Discharge: HOME | End: 2021-05-07
Payer: OTHER GOVERNMENT

## 2021-05-07 ENCOUNTER — OFFICE VISIT (OUTPATIENT)
Dept: FAMILY MEDICINE CLINIC | Age: 51
End: 2021-05-07
Payer: OTHER GOVERNMENT

## 2021-05-07 ENCOUNTER — TELEPHONE (OUTPATIENT)
Dept: FAMILY MEDICINE CLINIC | Age: 51
End: 2021-05-07

## 2021-05-07 VITALS
HEIGHT: 68 IN | RESPIRATION RATE: 16 BRPM | HEART RATE: 61 BPM | BODY MASS INDEX: 22.81 KG/M2 | OXYGEN SATURATION: 98 % | SYSTOLIC BLOOD PRESSURE: 130 MMHG | DIASTOLIC BLOOD PRESSURE: 64 MMHG

## 2021-05-07 DIAGNOSIS — R51.9 ACUTE INTRACTABLE HEADACHE, UNSPECIFIED HEADACHE TYPE: ICD-10-CM

## 2021-05-07 DIAGNOSIS — I26.99 PULMONARY EMBOLISM AND INFARCTION (HCC): ICD-10-CM

## 2021-05-07 DIAGNOSIS — M79.662 PAIN OF LEFT CALF: Primary | ICD-10-CM

## 2021-05-07 DIAGNOSIS — M79.662 PAIN OF LEFT CALF: ICD-10-CM

## 2021-05-07 LAB
APTT: 30 SEC (ref 24.2–36.2)
BASOPHILS ABSOLUTE: 0 K/UL (ref 0–0.2)
BASOPHILS RELATIVE PERCENT: 0.7 %
EOSINOPHILS ABSOLUTE: 0.1 K/UL (ref 0–0.6)
EOSINOPHILS RELATIVE PERCENT: 2 %
FIBRINOGEN: 297 MG/DL (ref 200–397)
HCT VFR BLD CALC: 40.7 % (ref 36–48)
HEMOGLOBIN: 13.9 G/DL (ref 12–16)
INR BLD: 1.27 (ref 0.86–1.14)
LYMPHOCYTES ABSOLUTE: 1.4 K/UL (ref 1–5.1)
LYMPHOCYTES RELATIVE PERCENT: 21.8 %
MCH RBC QN AUTO: 32.4 PG (ref 26–34)
MCHC RBC AUTO-ENTMCNC: 34.1 G/DL (ref 31–36)
MCV RBC AUTO: 95.1 FL (ref 80–100)
MONOCYTES ABSOLUTE: 0.4 K/UL (ref 0–1.3)
MONOCYTES RELATIVE PERCENT: 5.9 %
NEUTROPHILS ABSOLUTE: 4.5 K/UL (ref 1.7–7.7)
NEUTROPHILS RELATIVE PERCENT: 69.6 %
PDW BLD-RTO: 12.9 % (ref 12.4–15.4)
PLATELET # BLD: 343 K/UL (ref 135–450)
PMV BLD AUTO: 9.3 FL (ref 5–10.5)
PROTHROMBIN TIME: 14.8 SEC (ref 10–13.2)
RBC # BLD: 4.28 M/UL (ref 4–5.2)
WBC # BLD: 6.5 K/UL (ref 4–11)

## 2021-05-07 PROCEDURE — 93970 EXTREMITY STUDY: CPT

## 2021-05-07 PROCEDURE — 99214 OFFICE O/P EST MOD 30 MIN: CPT | Performed by: NURSE PRACTITIONER

## 2021-05-07 RX ORDER — BUTALBITAL, ACETAMINOPHEN AND CAFFEINE 50; 325; 40 MG/1; MG/1; MG/1
1 TABLET ORAL EVERY 6 HOURS PRN
Qty: 30 TABLET | Refills: 0 | Status: SHIPPED | OUTPATIENT
Start: 2021-05-07 | End: 2021-08-10

## 2021-05-07 ASSESSMENT — ENCOUNTER SYMPTOMS: RESPIRATORY NEGATIVE: 1

## 2021-05-07 NOTE — PROGRESS NOTES
Alcohol use: No        Vitals:    05/07/21 0835   BP: 130/64   Pulse: 61   Resp: 16   SpO2: 98%   Height: 5' 8\" (1.727 m)     Estimated body mass index is 22.81 kg/m² as calculated from the following:    Height as of this encounter: 5' 8\" (1.727 m). Weight as of 4/20/21: 150 lb (68 kg). Physical Exam  Vitals signs reviewed. Constitutional:       General: She is not in acute distress. Appearance: Normal appearance. She is normal weight. She is not ill-appearing, toxic-appearing or diaphoretic. HENT:      Head: Normocephalic. Neck:      Musculoskeletal: Normal range of motion. Cardiovascular:      Rate and Rhythm: Normal rate and regular rhythm. Pulses: Normal pulses. Heart sounds: Normal heart sounds. Pulmonary:      Effort: Pulmonary effort is normal.      Breath sounds: Normal breath sounds. Musculoskeletal: Normal range of motion. Thoracic back: She exhibits tenderness, pain and spasm. She exhibits normal range of motion, no bony tenderness, no swelling and no edema. Back:       Right lower leg: She exhibits tenderness. She exhibits no bony tenderness, no swelling, no deformity and no laceration. No edema. Legs:       Comments: Pt c/o pain in circled areas   Skin:     General: Skin is warm and dry. Findings: No erythema. Neurological:      Mental Status: She is alert and oriented to person, place, and time. ASSESSMENT/PLAN:  1. Pain of left calf  Pt is aware that even if she has a DVT, she is getting treatment. She requests an ultrasound to know for sure. - CBC Auto Differential  - FIBRINOGEN; Future  - PROTIME-INR  - APTT  - VL DUP UPPER EXTREMITY VENOUS LEFT    2. Pulmonary embolism and infarction [facility])  Advised the pt to continue taking the Eliquis as directed. Pain d/t a PE can last 6 weeks or longer. Will recheck labs.   - CBC Auto Differential  - FIBRINOGEN; Future  - PROTIME-INR  - APTT  - VL DUP UPPER EXTREMITY VENOUS LEFT      Return if symptoms worsen or fail to improve. An electronic signature was used to authenticate this note.     --JOB Swanson - CNP on 5/7/2021 at 9:18 AM

## 2021-05-11 ENCOUNTER — HOSPITAL ENCOUNTER (OUTPATIENT)
Age: 51
Discharge: HOME | End: 2021-05-11
Payer: OTHER GOVERNMENT

## 2021-05-11 ENCOUNTER — HOSPITAL ENCOUNTER (OUTPATIENT)
Dept: GENERAL RADIOLOGY | Age: 51
Discharge: HOME | End: 2021-05-11
Payer: OTHER GOVERNMENT

## 2021-05-11 DIAGNOSIS — R06.02 SOB (SHORTNESS OF BREATH): ICD-10-CM

## 2021-05-11 DIAGNOSIS — R07.89 CHEST TIGHTNESS: ICD-10-CM

## 2021-05-11 DIAGNOSIS — R07.89 CHEST TIGHTNESS: Primary | ICD-10-CM

## 2021-05-11 PROCEDURE — 71046 X-RAY EXAM CHEST 2 VIEWS: CPT

## 2021-05-12 ENCOUNTER — TELEPHONE (OUTPATIENT)
Dept: FAMILY MEDICINE CLINIC | Age: 51
End: 2021-05-12

## 2021-05-12 DIAGNOSIS — J18.9 PNEUMONIA OF RIGHT LOWER LOBE DUE TO INFECTIOUS ORGANISM: Primary | ICD-10-CM

## 2021-05-12 RX ORDER — AZITHROMYCIN 250 MG/1
250 TABLET, FILM COATED ORAL SEE ADMIN INSTRUCTIONS
Qty: 6 TABLET | Refills: 0 | Status: SHIPPED | OUTPATIENT
Start: 2021-05-12 | End: 2021-05-17

## 2021-05-18 DIAGNOSIS — J18.9 PNEUMONIA OF RIGHT LOWER LOBE DUE TO INFECTIOUS ORGANISM: Primary | ICD-10-CM

## 2021-05-18 RX ORDER — DOXYCYCLINE HYCLATE 100 MG
100 TABLET ORAL 2 TIMES DAILY
Qty: 20 TABLET | Refills: 0 | Status: SHIPPED | OUTPATIENT
Start: 2021-05-18 | End: 2021-05-28

## 2021-05-25 ENCOUNTER — APPOINTMENT (OUTPATIENT)
Dept: CT IMAGING | Age: 51
End: 2021-05-25
Payer: OTHER GOVERNMENT

## 2021-05-25 ENCOUNTER — OFFICE VISIT (OUTPATIENT)
Dept: PULMONOLOGY | Age: 51
End: 2021-05-25
Payer: OTHER GOVERNMENT

## 2021-05-25 ENCOUNTER — APPOINTMENT (OUTPATIENT)
Dept: GENERAL RADIOLOGY | Age: 51
End: 2021-05-25
Payer: OTHER GOVERNMENT

## 2021-05-25 ENCOUNTER — HOSPITAL ENCOUNTER (EMERGENCY)
Age: 51
Discharge: HOME | End: 2021-05-25
Attending: EMERGENCY MEDICINE
Payer: OTHER GOVERNMENT

## 2021-05-25 VITALS
WEIGHT: 156.4 LBS | SYSTOLIC BLOOD PRESSURE: 103 MMHG | BODY MASS INDEX: 23.16 KG/M2 | DIASTOLIC BLOOD PRESSURE: 50 MMHG | HEART RATE: 65 BPM | OXYGEN SATURATION: 98 % | HEIGHT: 69 IN

## 2021-05-25 VITALS
WEIGHT: 150 LBS | HEIGHT: 69 IN | TEMPERATURE: 98.5 F | HEART RATE: 56 BPM | SYSTOLIC BLOOD PRESSURE: 123 MMHG | BODY MASS INDEX: 22.22 KG/M2 | RESPIRATION RATE: 18 BRPM | OXYGEN SATURATION: 98 % | DIASTOLIC BLOOD PRESSURE: 68 MMHG

## 2021-05-25 DIAGNOSIS — R07.9 RIGHT-SIDED CHEST PAIN: Primary | ICD-10-CM

## 2021-05-25 DIAGNOSIS — G58.8 INTERCOSTAL NEURALGIA: Primary | ICD-10-CM

## 2021-05-25 DIAGNOSIS — R07.81 PLEURODYNIA: ICD-10-CM

## 2021-05-25 DIAGNOSIS — I26.99 OTHER ACUTE PULMONARY EMBOLISM WITHOUT ACUTE COR PULMONALE (HCC): ICD-10-CM

## 2021-05-25 DIAGNOSIS — I26.99 PULMONARY INFARCT (HCC): ICD-10-CM

## 2021-05-25 LAB
A/G RATIO: 1.3 (ref 1.1–2.2)
ALBUMIN SERPL-MCNC: 4 G/DL (ref 3.4–5)
ALP BLD-CCNC: 75 U/L (ref 40–129)
ALT SERPL-CCNC: 23 U/L (ref 10–40)
ANION GAP SERPL CALCULATED.3IONS-SCNC: 11 MMOL/L (ref 3–16)
AST SERPL-CCNC: 26 U/L (ref 15–37)
BASOPHILS ABSOLUTE: 0 K/UL (ref 0–0.2)
BASOPHILS RELATIVE PERCENT: 0.6 %
BILIRUB SERPL-MCNC: <0.2 MG/DL (ref 0–1)
BUN BLDV-MCNC: 15 MG/DL (ref 7–20)
CALCIUM SERPL-MCNC: 9.7 MG/DL (ref 8.3–10.6)
CHLORIDE BLD-SCNC: 106 MMOL/L (ref 99–110)
CO2: 26 MMOL/L (ref 21–32)
CREAT SERPL-MCNC: 0.9 MG/DL (ref 0.6–1.1)
EKG ATRIAL RATE: 56 BPM
EKG DIAGNOSIS: NORMAL
EKG P AXIS: 82 DEGREES
EKG P-R INTERVAL: 134 MS
EKG Q-T INTERVAL: 428 MS
EKG QRS DURATION: 88 MS
EKG QTC CALCULATION (BAZETT): 413 MS
EKG R AXIS: 80 DEGREES
EKG T AXIS: 46 DEGREES
EKG VENTRICULAR RATE: 56 BPM
EOSINOPHILS ABSOLUTE: 0.1 K/UL (ref 0–0.6)
EOSINOPHILS RELATIVE PERCENT: 0.9 %
GFR AFRICAN AMERICAN: >60
GFR NON-AFRICAN AMERICAN: >60
GLOBULIN: 3 G/DL
GLUCOSE BLD-MCNC: 107 MG/DL (ref 70–99)
HCT VFR BLD CALC: 38.9 % (ref 36–48)
HEMOGLOBIN: 13.2 G/DL (ref 12–16)
LYMPHOCYTES ABSOLUTE: 1.8 K/UL (ref 1–5.1)
LYMPHOCYTES RELATIVE PERCENT: 22.6 %
MCH RBC QN AUTO: 31.4 PG (ref 26–34)
MCHC RBC AUTO-ENTMCNC: 33.9 G/DL (ref 31–36)
MCV RBC AUTO: 92.9 FL (ref 80–100)
MONOCYTES ABSOLUTE: 0.5 K/UL (ref 0–1.3)
MONOCYTES RELATIVE PERCENT: 6.7 %
NEUTROPHILS ABSOLUTE: 5.6 K/UL (ref 1.7–7.7)
NEUTROPHILS RELATIVE PERCENT: 69.2 %
PDW BLD-RTO: 12.9 % (ref 12.4–15.4)
PLATELET # BLD: 269 K/UL (ref 135–450)
PMV BLD AUTO: 8.6 FL (ref 5–10.5)
POTASSIUM REFLEX MAGNESIUM: 3.9 MMOL/L (ref 3.5–5.1)
RBC # BLD: 4.19 M/UL (ref 4–5.2)
SODIUM BLD-SCNC: 143 MMOL/L (ref 136–145)
TOTAL PROTEIN: 7 G/DL (ref 6.4–8.2)
TROPONIN: <0.01 NG/ML
WBC # BLD: 8 K/UL (ref 4–11)

## 2021-05-25 PROCEDURE — 93005 ELECTROCARDIOGRAM TRACING: CPT | Performed by: EMERGENCY MEDICINE

## 2021-05-25 PROCEDURE — 93010 ELECTROCARDIOGRAM REPORT: CPT | Performed by: INTERNAL MEDICINE

## 2021-05-25 PROCEDURE — 6360000004 HC RX CONTRAST MEDICATION: Performed by: EMERGENCY MEDICINE

## 2021-05-25 PROCEDURE — 85025 COMPLETE CBC W/AUTO DIFF WBC: CPT

## 2021-05-25 PROCEDURE — 99204 OFFICE O/P NEW MOD 45 MIN: CPT | Performed by: INTERNAL MEDICINE

## 2021-05-25 PROCEDURE — 71260 CT THORAX DX C+: CPT

## 2021-05-25 PROCEDURE — 6370000000 HC RX 637 (ALT 250 FOR IP): Performed by: EMERGENCY MEDICINE

## 2021-05-25 PROCEDURE — 84484 ASSAY OF TROPONIN QUANT: CPT

## 2021-05-25 PROCEDURE — 99284 EMERGENCY DEPT VISIT MOD MDM: CPT

## 2021-05-25 PROCEDURE — 80053 COMPREHEN METABOLIC PANEL: CPT

## 2021-05-25 PROCEDURE — 71046 X-RAY EXAM CHEST 2 VIEWS: CPT

## 2021-05-25 RX ORDER — GABAPENTIN 600 MG/1
600 TABLET ORAL 3 TIMES DAILY
Qty: 90 TABLET | Refills: 0 | Status: SHIPPED | OUTPATIENT
Start: 2021-05-25 | End: 2021-06-21

## 2021-05-25 RX ORDER — IBUPROFEN 600 MG/1
600 TABLET ORAL ONCE
Status: COMPLETED | OUTPATIENT
Start: 2021-05-25 | End: 2021-05-25

## 2021-05-25 RX ADMIN — IBUPROFEN 600 MG: 600 TABLET ORAL at 06:05

## 2021-05-25 RX ADMIN — IOPAMIDOL 75 ML: 755 INJECTION, SOLUTION INTRAVENOUS at 06:24

## 2021-05-25 ASSESSMENT — ENCOUNTER SYMPTOMS
NAUSEA: 0
SORE THROAT: 0
BACK PAIN: 0
SORE THROAT: 0
ABDOMINAL PAIN: 0
CONSTIPATION: 0
COUGH: 1
CHEST TIGHTNESS: 0
STRIDOR: 0
VOMITING: 0
EYE PAIN: 0
ABDOMINAL PAIN: 0
CONSTIPATION: 0
EYE DISCHARGE: 0
EYE ITCHING: 0
DIARRHEA: 0
DIARRHEA: 0
VOICE CHANGE: 0
CHOKING: 0
SHORTNESS OF BREATH: 0

## 2021-05-25 ASSESSMENT — PAIN DESCRIPTION - FREQUENCY: FREQUENCY: CONTINUOUS

## 2021-05-25 ASSESSMENT — SLEEP AND FATIGUE QUESTIONNAIRES
ESS TOTAL SCORE: 7
HOW LIKELY ARE YOU TO NOD OFF OR FALL ASLEEP WHEN YOU ARE A PASSENGER IN A CAR FOR AN HOUR WITHOUT A BREAK: 2
HOW LIKELY ARE YOU TO NOD OFF OR FALL ASLEEP IN A CAR, WHILE STOPPED FOR A FEW MINUTES IN TRAFFIC: 0
HOW LIKELY ARE YOU TO NOD OFF OR FALL ASLEEP WHILE SITTING AND READING: 2

## 2021-05-25 ASSESSMENT — PAIN SCALES - GENERAL
PAINLEVEL_OUTOF10: 3
PAINLEVEL_OUTOF10: 2

## 2021-05-25 ASSESSMENT — PAIN DESCRIPTION - DESCRIPTORS: DESCRIPTORS: ACHING

## 2021-05-25 ASSESSMENT — PAIN DESCRIPTION - ORIENTATION: ORIENTATION: RIGHT

## 2021-05-25 ASSESSMENT — PAIN DESCRIPTION - PAIN TYPE: TYPE: ACUTE PAIN

## 2021-05-25 NOTE — PROGRESS NOTES
Pulmonary Outpatient Note   Rachel Gonzalez MD       5/25/2021    Chief Complaint:  New Patient (PE r/b [facility] ER)     HPI:   49y.o. year old female here for further eval of PE. Had an acute provoked PE a month ago, related to a combined risk factors of travel and receiving the Srinivasan Peter vaccine as well as OCP exposure. She was placed on Eliquis. She has had persistent right sided pleuritic pain that has not improved since onset. She was told at one point she also has pneumonia and was taking atb without relief. She has remained very active, runs 5 miles on a regular basis. Denies resp symptoms     CT chest from prior and today personally reviewed, resolving right lower pulmonary embolism, evolving right lower lobe infarct and pleural fluid     Past Medical History:   Diagnosis Date    GERD (gastroesophageal reflux disease)     Hypertension     PONV (postoperative nausea and vomiting)     Pulmonary embolism (HCC) 04/20/2021    Seasonal allergies        Past Surgical History:   Procedure Laterality Date    BREAST BIOPSY Right 12/13/2017     TWO SITE RIGHT NEEDLE LOCALIZED EXCISIONAL BREAST BIOPSY    COLONOSCOPY N/A 12/7/2020    COLONOSCOPY DIAGNOSTIC performed by Verner Pilsner, MD at [address] [place] [address]         Social History     Tobacco Use    Smoking status: Never Smoker    Smokeless tobacco: Never Used   Substance Use Topics    Alcohol use: No          Family History   Problem Relation Age of Onset    No Known Problems Mother     Heart Disease Father 52        CAD, hx of CABG    Colon Cancer Brother     No Known Problems Brother     Diabetes Paternal Aunt     Diabetes Paternal Uncle          Current Outpatient Medications:     Lactobacillus (PROBIOTIC ACIDOPHILUS PO), Take by mouth, Disp: , Rfl:     gabapentin (NEURONTIN) 600 MG tablet, Take 1 tablet by mouth 3 times daily for 30 days. , Disp: 90 tablet, Rfl: 0    doxycycline hyclate (VIBRA-TABS) 100 Psychiatric/Behavioral: Negative for agitation, confusion and hallucinations. Physical Exam  Constitutional:       General: She is not in acute distress. Appearance: She is well-developed. She is not diaphoretic. HENT:      Head: Normocephalic and atraumatic. Mouth/Throat:      Pharynx: No oropharyngeal exudate. Eyes:      Pupils: Pupils are equal, round, and reactive to light. Neck:      Vascular: No JVD. Cardiovascular:      Heart sounds: Normal heart sounds. No murmur heard. No friction rub. No gallop. Pulmonary:      Effort: Pulmonary effort is normal.      Breath sounds: No wheezing or rales. Abdominal:      General: Bowel sounds are normal. There is no distension. Palpations: Abdomen is soft. Tenderness: There is no abdominal tenderness. Musculoskeletal:         General: Normal range of motion. Cervical back: Neck supple. Lymphadenopathy:      Cervical: No cervical adenopathy. Skin:     General: Skin is warm and dry. Findings: No rash. Neurological:      Mental Status: She is alert. Cranial Nerves: No cranial nerve deficit. Comments: CN 2-12 grossly intact         ASSESSMENT:    1. Intercostal neuralgia    2. Pleurodynia    3. Other acute pulmonary embolism without acute cor pulmonale (HCC)      PLAN:    -Symptoms consistent with pleurodynia and intercostal neuralgia due to location of pulmonary infarct. Discussed with the patient extensively   -Start on a trial of gabepentin, reviewed proper use and potential adverse effects/issues.  If this fails can also consider intercostal nerve blocks  -Provoked PE, continue 2 more months of anticoagulation for a total 3 month course and aspirin therapy after that   -Instructed to notify if symptoms not improving with above         Rosalia Botello MD

## 2021-05-25 NOTE — PROGRESS NOTES
MA Communication: The following orders are received by verbal communication from Dr. Maureen Sandifer. Orders include:     Follow up as needed

## 2021-05-25 NOTE — ED PROVIDER NOTES
[facility]  ED  EMERGENCY DEPARTMENT ENCOUNTER      Pt Name: Hien Cota  MRN: [id number]  [place] 1970  Date of evaluation: 5/25/2021  Provider: Arcelia Tse MD    [address] [place] [address]       Chief Complaint   Patient presents with    Chest Pain     Patient comes into ED with c/o chest pain, palpitations. Patient diagnosed with PE one month ago and has been having issues with chest pain ever since. HISTORY OF PRESENT ILLNESS   (Location/Symptom, Timing/Onset, Context/Setting, Quality, Duration, Modifying Factors, Severity)  Note limiting factors. Hien Cota is a 49 y.o. female who presents to the emergency department     Is a 52-year-old female with a past medical history of hypertension, pulmonary embolism on Eliquis who presents with worsening right-sided chest pain. Patient reports that she was diagnosed with a pulmonary embolism April 20. She is currently on Eliquis. She did develop a right lower lobe pneumonia as a complication. Had worsening chest pain and had persistent pneumonia so was started on a second course of antibiotics. She reports she 6 days into this course of antibiotics but pain is still persistent and she became concerned that something else was wrong. Reports subjective fevers and chills. Describes a right-sided sharp chest pain that she currently rates as a 3 out of 10 on the pain scale. Reports unproductive cough. Denies shortness of breath but does report feeling worse on exertion. The history is provided by the patient. Nursing Notes were reviewed. REVIEW OF SYSTEMS    (2-9 systems for level 4, 10 or more for level 5)     Review of Systems   Constitutional: Positive for chills and fever. HENT: Negative for congestion and sore throat. Eyes: Negative for visual disturbance. Respiratory: Positive for cough. Negative for shortness of breath. Cardiovascular: Positive for chest pain.    Gastrointestinal: Negative for abdominal pain, constipation, diarrhea, nausea and vomiting. Genitourinary: Negative for dysuria and hematuria. Musculoskeletal: Negative for back pain and neck pain. Skin: Negative for pallor and rash. Neurological: Positive for headaches. Negative for light-headedness. All other systems reviewed and are negative. Except as noted above the remainder of the review of systems was reviewed and negative.        PAST MEDICAL HISTORY     Past Medical History:   Diagnosis Date    GERD (gastroesophageal reflux disease)     Hypertension     PONV (postoperative nausea and vomiting)     Pulmonary embolism ([facility] 04/20/2021    Seasonal allergies          SURGICAL HISTORY       Past Surgical History:   Procedure Laterality Date    BREAST BIOPSY Right 12/13/2017     TWO SITE RIGHT NEEDLE LOCALIZED EXCISIONAL BREAST BIOPSY    COLONOSCOPY N/A 12/7/2020    COLONOSCOPY DIAGNOSTIC performed by Alejandro Lopez MD at [facility]       Previous Medications    APIXABAN STARTER PACK (ELIQUIS) 5 MG TBPK TABLET    Take 1 tablet by mouth See Admin Instructions 10 mg twice per day for 7 days, then 5 mg twice per day for 6 months    BUTALBITAL-ACETAMINOPHEN-CAFFEINE (FIORICET, ESGIC) -40 MG PER TABLET    Take 1 tablet by mouth every 6 hours as needed for Headaches    DOXYCYCLINE HYCLATE (VIBRA-TABS) 100 MG TABLET    Take 1 tablet by mouth 2 times daily for 10 days    LORATADINE (CLARITIN) 10 MG TABLET    Take 10 mg by mouth daily    METOPROLOL SUCCINATE (TOPROL XL) 25 MG EXTENDED RELEASE TABLET    TAKE 1 TABLET BY MOUTH EVERY DAY    MULTIPLE VITAMINS-MINERALS (THERAPEUTIC MULTIVITAMIN-MINERALS) TABLET    Take 1 tablet by mouth daily    OMEGA 3-6-9 FATTY ACIDS PO    Take by mouth    VITAMIN B-12 (CYANOCOBALAMIN) 100 MCG TABLET    Take 50 mcg by mouth daily       ALLERGIES     Codeine, Decongestant [pseudoephedrine hcl], Norco PHYSICAL EXAM    (up to 7 for level 4, 8 or more for level 5)     ED Triage Vitals [05/25/21 0159]   BP Temp Temp src Pulse Resp SpO2 Height Weight   127/66 98.5 °F (36.9 °C) -- 64 20 96 % 5' 9\" (1.753 m) 150 lb (68 kg)       Physical Exam  Vitals and nursing note reviewed. Constitutional:       General: She is not in acute distress. Appearance: Normal appearance. HENT:      Head: Normocephalic and atraumatic. Nose: Nose normal. No congestion. Mouth/Throat:      Mouth: Mucous membranes are moist.   Eyes:      Conjunctiva/sclera: Conjunctivae normal.   Cardiovascular:      Rate and Rhythm: Normal rate and regular rhythm. Pulses: Normal pulses. Heart sounds: Normal heart sounds. No murmur heard. Pulmonary:      Effort: Pulmonary effort is normal. No respiratory distress. Breath sounds: Normal breath sounds. Abdominal:      General: There is no distension. Palpations: Abdomen is soft. Tenderness: There is no abdominal tenderness. Musculoskeletal:         General: No swelling or deformity. Normal range of motion. Cervical back: Normal range of motion and neck supple. Skin:     General: Skin is warm and dry. Neurological:      General: No focal deficit present. Mental Status: She is alert and oriented to person, place, and time. DIAGNOSTIC RESULTS     EKG: All EKG's are interpreted by the Emergency Department Physician who either signs or Co-signs this chart in the absence of a cardiologist.    Sinus bradycardia, rate 56, normal intervals, no STEMI, similar to previous EKG dated April 20, 2021    RADIOLOGY:     Interpretation per the Radiologist below, if available at the time of this note:    CT CHEST PULMONARY EMBOLISM W CONTRAST   Final Result   1. Evolving right lower lobe pulmonary infarct. 2. Improved with residual trace right pleural effusion. XR CHEST (2 VW)   Final Result   No acute disease.                LABS:  Labs Reviewed   COMPREHENSIVE METABOLIC PANEL W/ REFLEX TO MG FOR LOW K - Abnormal; Notable for the following components:       Result Value    Glucose 107 (*)     All other components within normal limits    Narrative:     Performed at:  [address], [facility]   Phone [phone]   CBC WITH AUTO DIFFERENTIAL    Narrative:     Performed at:  [address], [facility]   Phone [phone]   TROPONIN    Narrative:     Performed at:  [address], [facility]   Phone [phone]       All other labs were within normal range or not returned as of this dictation. EMERGENCY DEPARTMENT COURSE and DIFFERENTIAL DIAGNOSIS/MDM:   Vitals:    Vitals:    05/25/21 0159 05/25/21 0636   BP: 127/66 131/69   Pulse: 64 57   Resp: 20 18   Temp: 98.5 °F (36.9 °C)    SpO2: 96% 98%   Weight: 150 lb (68 kg)    Height: 5' 9\" (1.753 m)        Patient evaluated and previous record reviewed. Patient presents with right-sided chest pain after recent pulmonary embolism in April. Vital signs stable and within normal limits. Physical exam as documented above. Lab work-up largely unremarkable. CT chest shows resolved pulmonary emboli but evolving right lower lobe infarct. This is likely the source of patient's pain. Patient is reassured to know that the pulmonary emboli have resolved. Advised her to treat her symptoms with Tylenol and ibuprofen and to follow-up with pulmonology as an outpatient. She voiced understanding and is amenable with this plan. Patient discharged home. CONSULTS:  None    PROCEDURES:  Unless otherwise noted below, none     Procedures      FINAL IMPRESSION      1. Right-sided chest pain    2.  Pulmonary infarct [facility])          DISPOSITION/PLAN   DISPOSITION Decision To Discharge 05/25/2021 07:23:01 AM      PATIENT REFERRED TO:  Pamela Hussein Kristine Oneil [address] [place] [address]  [phone]    Schedule an appointment as soon as possible for a visit         DISCHARGE MEDICATIONS:  New Prescriptions    No medications on file     Controlled Substances Monitoring:     No flowsheet data found.     (Please note that portions of this note were completed with a voice recognition program.  Efforts were made to edit the dictations but occasionally words are mis-transcribed.)    Deonte Jin MD (electronically signed)  Attending Emergency Physician           Magdalena Lai MD  05/25/21 0042

## 2021-05-25 NOTE — PATIENT INSTRUCTIONS
Remember to bring a list of pulmonary medications and any CPAP or BiPAP machines to your next appointment with the office. Please keep all of your future appointments scheduled by [facility] office. Out of respect for other patients and providers, you may be asked to reschedule your appointment if you arrive later than your scheduled appointment time. Appointments cancelled less than 24hrs in advance will be considered a no show. Patients with three missed appointments within 1 year or four missed appointments within 2 years can be dismissed from the practice. Please be aware that our physicians are required to work in the Intensive Care Unit at [facility].  Your appointment may need to be rescheduled if they are designated to work during your appointment time. You may receive a survey regarding the care you received during your visit. Your input is valuable to us. We encourage you to complete and return your survey. We hope you will choose us in the future for your healthcare needs. Pt instructed of all future appointment dates & times, including radiology, labs, procedures & referrals. If procedures were scheduled preparation instructions provided. Instructions on future appointments with [facility] were given.

## 2021-06-21 RX ORDER — GABAPENTIN 600 MG/1
TABLET ORAL
Qty: 90 TABLET | Refills: 0 | Status: SHIPPED | OUTPATIENT
Start: 2021-06-21 | End: 2021-07-21 | Stop reason: SDUPTHER

## 2021-06-29 ENCOUNTER — HOSPITAL ENCOUNTER (OUTPATIENT)
Dept: GENERAL RADIOLOGY | Age: 51
Discharge: HOME | End: 2021-06-29
Payer: OTHER GOVERNMENT

## 2021-06-29 ENCOUNTER — HOSPITAL ENCOUNTER (OUTPATIENT)
Age: 51
Discharge: HOME | End: 2021-06-29
Payer: OTHER GOVERNMENT

## 2021-06-29 DIAGNOSIS — R06.02 SHORTNESS OF BREATH: ICD-10-CM

## 2021-06-29 DIAGNOSIS — I26.99 PULMONARY INFARCT (HCC): ICD-10-CM

## 2021-06-29 PROCEDURE — 71046 X-RAY EXAM CHEST 2 VIEWS: CPT

## 2021-07-02 ENCOUNTER — PATIENT MESSAGE (OUTPATIENT)
Dept: FAMILY MEDICINE CLINIC | Age: 51
End: 2021-07-02

## 2021-07-02 DIAGNOSIS — F40.298 FEAR OF TESTS: ICD-10-CM

## 2021-07-02 DIAGNOSIS — M51.34 DDD (DEGENERATIVE DISC DISEASE), THORACIC: Primary | ICD-10-CM

## 2021-07-02 DIAGNOSIS — I10 ESSENTIAL HYPERTENSION: ICD-10-CM

## 2021-07-02 DIAGNOSIS — R00.2 HEART PALPITATIONS: Primary | ICD-10-CM

## 2021-07-02 RX ORDER — METOPROLOL SUCCINATE 25 MG/1
TABLET, EXTENDED RELEASE ORAL
Qty: 30 TABLET | Refills: 1 | Status: SHIPPED | OUTPATIENT
Start: 2021-07-02 | End: 2021-08-24 | Stop reason: SINTOL

## 2021-07-02 NOTE — TELEPHONE ENCOUNTER
Please let the pt know that since this an established problem, she does not need to come in for an OV. Her recent EKG done in May 2021 showed sinus bradycardia, no arrhythmias. I put in an order for a 48 hour holter monitor. I will call her once we receive those results.

## 2021-07-02 NOTE — TELEPHONE ENCOUNTER
.  Last office visit 5/7/2021     Last written 5-4-21 30 with 1      Next office visit scheduled Visit date not found    Requested Prescriptions     Pending Prescriptions Disp Refills    metoprolol succinate (TOPROL XL) 25 MG extended release tablet [Pharmacy Med Name: METOPROLOL ER SUCCINATE 25MG TABS] 30 tablet 1     Sig: TAKE 1 TABLET BY MOUTH EVERY DAY

## 2021-07-02 NOTE — TELEPHONE ENCOUNTER
From: Sana Infante  To: Kourtney De Leon, APRN - CNP  Sent: 7/2/2021 10:35 AM EDT  Subject: Non-Urgent Medical Question    Good morning Ambar Mchugh, Previously we discussed just calling in my blood pressure refill. I am due in several days but was hoping to schedule time with you to discuss ongoing issues and to re-evaluate/consider the option to change my medicine (based on the recommendation of the ER doctor). I hope all is well and I look forward to seeing you soon. I hope you have a great 4th of July weekend.  Best Konstantin Barges

## 2021-07-12 ENCOUNTER — HOSPITAL ENCOUNTER (OUTPATIENT)
Dept: NON INVASIVE DIAGNOSTICS | Age: 51
Discharge: HOME | End: 2021-07-12
Payer: OTHER GOVERNMENT

## 2021-07-12 DIAGNOSIS — R00.2 HEART PALPITATIONS: ICD-10-CM

## 2021-07-12 PROCEDURE — 93226 XTRNL ECG REC<48 HR SCAN A/R: CPT

## 2021-07-12 PROCEDURE — 93225 XTRNL ECG REC<48 HRS REC: CPT

## 2021-07-13 ENCOUNTER — TELEPHONE (OUTPATIENT)
Dept: PULMONOLOGY | Age: 51
End: 2021-07-13

## 2021-07-13 NOTE — TELEPHONE ENCOUNTER
Patient is calling in saying she messaged Dr. Amy Smith on 7/1/21on  alejandra patient had a blood clot in right lung and is now having back pain she said that started back in April. Patient pain is now coming back and she says it comes and goes patient said pain is now on her left side in the middle of back and burns and is persistent pain, pt stated Dr. Cochran Zion her back today 7/13/21 this morning  and told pt to keep doing what she is doing and go see her pcp pt said she is frustrated cause she seem to be getting the run around's and she would like to know if she still needs CT scan? and why would she need to go back to her pcp? she said she don't want to start over when she was already getting somewhere with her problem when Dr. Amy Smith told her the back pain is from her blood clot and pluracey and would like to know more please advise.      Pt was told to wait 2 weeks to see if back pain would go away by Bernice Tipton  and she said it has not improved and she said Dr. Amy Smith would do a CT scan to see why it keeps happening pt is only using a Hot compress for now and pt stated she is very confused

## 2021-07-15 LAB
ACQUISITION DURATION: NORMAL S
AVERAGE HEART RATE: 64 BPM
EKG DIAGNOSIS: NORMAL
HOLTER MAX HEART RATE: 117 BPM
HOOKUP DATE: NORMAL
HOOKUP TIME: NORMAL
MAX HEART RATE TIME/DATE: NORMAL
MIN HEART RATE TIME/DATE: NORMAL
MIN HEART RATE: 48 BPM
NUMBER OF QRS COMPLEXES: NORMAL
NUMBER OF SUPRAVENTRICULAR COUPLETS: 1
NUMBER OF SUPRAVENTRICULAR ECTOPICS: 750
NUMBER OF SUPRAVENTRICULAR ISOLATED BEATS: 712
NUMBER OF VENTRICULAR BIGEMINAL CYCLES: 0
NUMBER OF VENTRICULAR COUPLETS: 0
NUMBER OF VENTRICULAR ECTOPICS: 0

## 2021-07-16 ENCOUNTER — OFFICE VISIT (OUTPATIENT)
Dept: FAMILY MEDICINE CLINIC | Age: 51
End: 2021-07-16
Payer: OTHER GOVERNMENT

## 2021-07-16 ENCOUNTER — TELEPHONE (OUTPATIENT)
Dept: FAMILY MEDICINE CLINIC | Age: 51
End: 2021-07-16

## 2021-07-16 ENCOUNTER — HOSPITAL ENCOUNTER (OUTPATIENT)
Dept: CT IMAGING | Age: 51
Discharge: HOME | End: 2021-07-16
Payer: OTHER GOVERNMENT

## 2021-07-16 VITALS
OXYGEN SATURATION: 97 % | WEIGHT: 161.8 LBS | SYSTOLIC BLOOD PRESSURE: 100 MMHG | BODY MASS INDEX: 23.89 KG/M2 | DIASTOLIC BLOOD PRESSURE: 60 MMHG | RESPIRATION RATE: 18 BRPM | HEART RATE: 62 BPM

## 2021-07-16 DIAGNOSIS — R07.9 CHEST PAIN, UNSPECIFIED TYPE: ICD-10-CM

## 2021-07-16 DIAGNOSIS — I10 ESSENTIAL HYPERTENSION: Primary | ICD-10-CM

## 2021-07-16 DIAGNOSIS — R06.02 SOB (SHORTNESS OF BREATH): ICD-10-CM

## 2021-07-16 DIAGNOSIS — R06.02 SOB (SHORTNESS OF BREATH): Primary | ICD-10-CM

## 2021-07-16 DIAGNOSIS — I10 ESSENTIAL HYPERTENSION: ICD-10-CM

## 2021-07-16 DIAGNOSIS — M51.34 DDD (DEGENERATIVE DISC DISEASE), THORACIC: Primary | ICD-10-CM

## 2021-07-16 LAB
GFR AFRICAN AMERICAN: >60
GFR NON-AFRICAN AMERICAN: 59
PERFORMED ON: ABNORMAL
POC CREATININE: 1 MG/DL (ref 0.6–1.1)
POC SAMPLE TYPE: ABNORMAL

## 2021-07-16 PROCEDURE — 99214 OFFICE O/P EST MOD 30 MIN: CPT | Performed by: NURSE PRACTITIONER

## 2021-07-16 PROCEDURE — 6360000004 HC RX CONTRAST MEDICATION: Performed by: NURSE PRACTITIONER

## 2021-07-16 PROCEDURE — 82565 ASSAY OF CREATININE: CPT

## 2021-07-16 PROCEDURE — 71260 CT THORAX DX C+: CPT

## 2021-07-16 RX ORDER — METHOCARBAMOL 750 MG/1
750 TABLET, FILM COATED ORAL 3 TIMES DAILY
Qty: 30 TABLET | Refills: 0 | Status: SHIPPED | OUTPATIENT
Start: 2021-07-16 | End: 2021-07-26

## 2021-07-16 RX ORDER — PREDNISONE 10 MG/1
TABLET ORAL
Qty: 21 TABLET | Refills: 0 | Status: SHIPPED | OUTPATIENT
Start: 2021-07-16 | End: 2021-07-28

## 2021-07-16 RX ADMIN — IOPAMIDOL 75 ML: 755 INJECTION, SOLUTION INTRAVENOUS at 14:28

## 2021-07-16 NOTE — TELEPHONE ENCOUNTER
Please let patient know that Sinan Hodges is out of the office until next week. I am leaving this for her review. In the mean time I will send message to [facility] , Lance Solares RN and see if she can reach out to the  at the Pulmonology office. I do apologize and we want her to feel better. Please let her know we are trying to get her an answer and will follow back up with her.  Thanks, JOB Padilla, CNP

## 2021-07-16 NOTE — TELEPHONE ENCOUNTER
Pt called after messaging    Pain in back since R Pulm Emb in April  Now new onset left side pain in back - much like what she experienced in April with Right PE. Lean back is painful and she has to sleep on her stomach. Constant fatigue  Persistent pain   Pain does not worsen with deep inspiration  Denies shortness of breath  Heavy thumping feeling when she breaths at night and is lying down. Denies syncope or palpitations. Discussed case with PCP AMA Ventura. Plan:  Stat CT of Chest  Appt with DPH after.

## 2021-07-16 NOTE — PROGRESS NOTES
Alejandro Cadena (:  1970) is a 49 y.o. female,Established patient, here for evaluation of the following chief complaint(s):  Back Pain (mid-upper) and Other (chest CT)         ASSESSMENT/PLAN:  1. DDD (degenerative disc disease), thoracic  -     predniSONE (DELTASONE) 10 MG tablet; 2 tablets 2 times daily for 3 days, 1 tablet 2 times daily for 3 days, 1 tablet daily. , Disp-21 tablet, R-0Normal  -     methocarbamol (ROBAXIN-750) 750 MG tablet; Take 1 tablet by mouth 3 times daily for 10 days, Disp-30 tablet, R-0Normal    Apply ice pack 4 times daily for 15-20 minutes. Wrap in towel, etc to avoid the coldness directly to the skin. Recommend continuing gabapentin. Will contact pulmonary given they have ordered last 2 times. If they wish for PCP to manage she will contact office for us to take over. Reviewed 48 hour Holter monitor. Early beats but stable. Reviewed labs, CT chest from earlier today. Return in about 2 weeks (around 2021) for Louie Alford--back pain. Subjective   SUBJECTIVE/OBJECTIVE:  HPI      2021 had the J&J vaccine in march. Traveled to [place] driving. Has back pain that she felt was pretty severe. CTA right PE, admitted to hospital.     3 weeks later still not feeling better, mid upper back pain around to bilateral scapula. Burning sensation. Had heart palpitations. Chest xray with pneumonia. Completed ATB    Continued with back pain. 3 weeks later to ER. Advised PE better, infarct right lung and referred to pulmonologist. Seen by Dr. Francisco Smallwood and advised had pleurisy as well. Was given gabapentin. Had relief for couple of weeks. Progressively returned. Then with hot flashes, palpitations, left scapula. Constant. Noted more at night. Follow up with Dr. Chloé Patrick. Chest xray repeated. Advised if not improved would repeat CT scan. Then advised should follow up with PCP, consider skeletal pain. Palpitations occur at random, feels like a thump.  Sometimes occurs more often than others. Just completed Holter monitor. Review of Systems   All other systems reviewed and are negative. Objective   Physical Exam  Constitutional:       Appearance: Normal appearance. She is well-developed. HENT:      Head: Normocephalic and atraumatic. Neck:      Thyroid: No thyromegaly. Cardiovascular:      Rate and Rhythm: Normal rate and regular rhythm. Pulses: Normal pulses. Heart sounds: Normal heart sounds. Pulmonary:      Effort: Pulmonary effort is normal.      Breath sounds: Normal breath sounds. Abdominal:      General: Bowel sounds are normal.      Palpations: Abdomen is soft. Musculoskeletal:         General: Normal range of motion. Cervical back: Normal range of motion and neck supple. Skin:     General: Skin is warm. Neurological:      Mental Status: She is alert and oriented to person, place, and time.    Psychiatric:         Behavior: Behavior normal.                  An electronic signature was used to authenticate this note.    --JOB IBARRA - CNP

## 2021-07-19 ENCOUNTER — TELEPHONE (OUTPATIENT)
Dept: PULMONOLOGY | Age: 51
End: 2021-07-19

## 2021-07-19 NOTE — TELEPHONE ENCOUNTER
CTA OF THE CHEST 7/16/2021 2:01 pm       TECHNIQUE:   CTA of the chest was performed after the administration of intravenous   contrast.  Multiplanar reformatted images are provided for review. MIP   images are provided for review. Dose modulation, iterative reconstruction,   and/or weight based adjustment of the mA/kV was utilized to reduce the   radiation dose to as low as reasonably achievable. COMPARISON:   05/25/2021, 04/20/2021, 03/01/2016       HISTORY:   ORDERING SYSTEM PROVIDED HISTORY: SOB (shortness of breath)   TECHNOLOGIST PROVIDED HISTORY:   Reason for exam:->hx of pulmonary emboli, ongoing SOB, chest pain   Reason for Exam: still having breathing issues and pain on left side in the   back and under left armpit area,  history of blood clot on rt side in April,   Acuity: Acute   Type of Exam: Initial   Relevant Medical/Surgical History: see epic       FINDINGS:   Pulmonary Arteries: Pulmonary arteries are adequately opacified for   evaluation. No evidence of intraluminal filling defect to suggest pulmonary   embolism. Main pulmonary artery is normal in caliber. Mediastinum: There is no pathologic lymphadenopathy within the chest or   mediastinum. The intrathoracic aorta is unremarkable, without evidence of   aneurysm or dissection. No pericardial abnormality is identified. Lungs/pleura: The central airways are patent. There is mild dependent   atelectasis within both lower lobes. There has been interval decrease in   size of a nodular focus of opacity within the posterior basal segment of the   right lower lobe, most consistent with an evolving pulmonary infarct. No   focus of acute airspace consolidation is identified. There is no evidence of   a pneumothorax or pleural effusion. There are a few stable subcentimeter   nodules throughout both lungs, the largest measuring approximately 3.5 mm   within the anterior right lower lobe, unchanged from 03/01/2016.   No new

## 2021-07-21 DIAGNOSIS — M54.6 ACUTE MIDLINE THORACIC BACK PAIN: Primary | ICD-10-CM

## 2021-07-21 RX ORDER — GABAPENTIN 600 MG/1
TABLET ORAL
Qty: 90 TABLET | Refills: 0 | Status: SHIPPED | OUTPATIENT
Start: 2021-07-21 | End: 2021-08-23

## 2021-07-28 RX ORDER — PREDNISONE 10 MG/1
TABLET ORAL
Qty: 10 TABLET | Refills: 0 | Status: SHIPPED | OUTPATIENT
Start: 2021-07-28 | End: 2021-07-28

## 2021-07-28 RX ORDER — PREDNISONE 10 MG/1
TABLET ORAL
Qty: 21 TABLET | Refills: 0 | Status: SHIPPED | OUTPATIENT
Start: 2021-07-28 | End: 2021-08-11 | Stop reason: SDUPTHER

## 2021-07-28 NOTE — TELEPHONE ENCOUNTER
Should have enough Gabapentin until her visit with Sinan Hua, will reorder prednisone for another 6 days until visit with Sinan Hua for back. Please let patient know. Any questions, let us know.  Thanks, Unruly Barajas

## 2021-08-04 ENCOUNTER — OFFICE VISIT (OUTPATIENT)
Dept: FAMILY MEDICINE CLINIC | Age: 51
End: 2021-08-04
Payer: OTHER GOVERNMENT

## 2021-08-04 VITALS
WEIGHT: 163.4 LBS | SYSTOLIC BLOOD PRESSURE: 96 MMHG | BODY MASS INDEX: 24.13 KG/M2 | HEART RATE: 62 BPM | OXYGEN SATURATION: 97 % | DIASTOLIC BLOOD PRESSURE: 68 MMHG

## 2021-08-04 DIAGNOSIS — M51.34 DDD (DEGENERATIVE DISC DISEASE), THORACIC: Primary | ICD-10-CM

## 2021-08-04 DIAGNOSIS — R07.89 CHEST TIGHTNESS: ICD-10-CM

## 2021-08-04 PROCEDURE — 99214 OFFICE O/P EST MOD 30 MIN: CPT | Performed by: NURSE PRACTITIONER

## 2021-08-04 ASSESSMENT — ENCOUNTER SYMPTOMS
GASTROINTESTINAL NEGATIVE: 1
RESPIRATORY NEGATIVE: 1
BACK PAIN: 1

## 2021-08-04 NOTE — PROGRESS NOTES
2021     Adams Zhang (:  1970) is a 49 y.o. female, here for evaluation of the following medical concerns:    HPI  Pt c/o mid back pain for the past 4 months. H/o PE 4 months ago, recent CT chest done 2021 for mid back pain continued symptoms. Showed improvement of RLL pulmonary infarct. Chest CT did show mild DDD throughout the thoracic spine. Treated with prednisone which the pt states has helped. Pain is worse with position changes and palpitation. She states that the pain is worse on the left side, but does wrap around to her right side. Taking Eliquis for recent PE. Pt is to avoid NSAIDs. States that since she had her PE 4 months ago she has developed heart palpitations and \"sweats\". She has also had some chest heaviness which she attributed to her PE. BCP was dc'd 4 months ago. Pt has not had a menstrual period since then. 48 hour holter monitor done 21 - showed sinus bradycardia. Pt admits to running 5 miles 6 days per week. She is still taking metoprolol as prescribed by previous provider for \"heart palpitations and rapid heart rate\". BP has been low over the past 3 months. Fasting labs ordered 2020, pt still has not got them done. Review of Systems   Constitutional: Positive for diaphoresis. Negative for activity change, appetite change, chills, fatigue, fever and unexpected weight change. HENT: Negative. Respiratory: Negative. Cardiovascular: Positive for palpitations. Chest heaviness   Gastrointestinal: Negative. Musculoskeletal: Positive for back pain. Negative for arthralgias, gait problem, joint swelling, myalgias, neck pain and neck stiffness. Skin: Negative. Neurological: Negative. Prior to Visit Medications    Medication Sig Taking? Authorizing Provider   predniSONE (DELTASONE) 10 MG tablet 2 tablets 2 times daily for 3 days, 1 tablet 2 times daily for 3 days, 1 tablet daily.  Yes Luh Dumont Effort: Pulmonary effort is normal. No respiratory distress. Breath sounds: Normal breath sounds. No stridor. No wheezing, rhonchi or rales. Chest:      Chest wall: No tenderness. Musculoskeletal:      Cervical back: Normal range of motion. Thoracic back: Tenderness present. No swelling, edema, deformity, signs of trauma, lacerations, spasms or bony tenderness. Normal range of motion. No scoliosis. Back:       Comments: Pt c/o pain upon palpation. Skin:     General: Skin is warm and dry. Neurological:      Mental Status: She is alert and oriented to person, place, and time. Psychiatric:         Mood and Affect: Mood normal.         Behavior: Behavior normal.         Thought Content: Thought content normal.         Judgment: Judgment normal.         ASSESSMENT/PLAN:  1. DDD (degenerative disc disease), thoracic  Continue Prednisone as prescribed. Pt is currently taking Eliquis for PE 4/2021 and will be for a total of 6 months. NSAIDs should be avoided. - MRI THORACIC SPINE WO CONTRAST; Future  - Bard Avi WHITFIELD, Orthopedic Surgery, [facility]    2. Chest tightness  See HPI. Recent Chest CT showed improvement. Advised pt to d/c metoprolol since her BP and HR has been running low. Will check echo, and have pt f/u with Cardiology. Fasting lipid panel ordered 4/2020, I encouraged the pt to get this done as soon as possible. She states that she will come back in this week to have it drawn.     - ECHO Complete 2D W Doppler W Color; Future  - Suad Milner MD, Cardiology, [facility]  - Lipid, Fasting; Future      Return for TBD. An electronic signature was used to authenticate this note.     --JOB De Paz - CNP on 8/4/2021 at 5:28 PM

## 2021-08-10 DIAGNOSIS — F40.298 FEAR OF TESTS: ICD-10-CM

## 2021-08-10 RX ORDER — DIAZEPAM 10 MG/1
TABLET ORAL
Qty: 1 TABLET | Refills: 0 | Status: SHIPPED | OUTPATIENT
Start: 2021-08-10 | End: 2022-06-22 | Stop reason: SDUPTHER

## 2021-08-11 DIAGNOSIS — M51.34 DDD (DEGENERATIVE DISC DISEASE), THORACIC: ICD-10-CM

## 2021-08-12 RX ORDER — PREDNISONE 10 MG/1
TABLET ORAL
Qty: 21 TABLET | Refills: 0 | Status: SHIPPED | OUTPATIENT
Start: 2021-08-12 | End: 2021-08-24 | Stop reason: ALTCHOICE

## 2021-08-12 NOTE — TELEPHONE ENCOUNTER
Refill Request     Last Seen: Last Seen Department: 2021    Last Seen by PCP: 21     Last Written: 21 21 tablet 0 refills    Next Appointment: n/a  Future Appointments   Date Time Provider Isi Spaulding   2021 10:30 AM ECHO ONLY ROOM 1 - [facility] ECHO [facility]   2021 12:00 PM A MRI RM 2 [facility] MRI Herlene Bones   2021  8:45 AM MD Dulce Davis   2021  3:20 PM Homer Castillo MD AND ORTHO [facility]       Future appointment scheduled      Requested Prescriptions     Pending Prescriptions Disp Refills    predniSONE (DELTASONE) 10 MG tablet 21 tablet 0     Si tablets 2 times daily for 3 days, 1 tablet 2 times daily for 3 days, 1 tablet daily.

## 2021-08-19 ENCOUNTER — HOSPITAL ENCOUNTER (OUTPATIENT)
Dept: NON INVASIVE DIAGNOSTICS | Age: 51
Discharge: HOME | End: 2021-08-19
Payer: OTHER GOVERNMENT

## 2021-08-19 ENCOUNTER — HOSPITAL ENCOUNTER (OUTPATIENT)
Dept: MRI IMAGING | Age: 51
Discharge: HOME | End: 2021-08-19
Payer: OTHER GOVERNMENT

## 2021-08-19 DIAGNOSIS — M51.34 DDD (DEGENERATIVE DISC DISEASE), THORACIC: ICD-10-CM

## 2021-08-19 DIAGNOSIS — R07.89 CHEST TIGHTNESS: ICD-10-CM

## 2021-08-19 LAB
LV EF: 55 %
LVEF MODALITY: NORMAL

## 2021-08-19 PROCEDURE — 93306 TTE W/DOPPLER COMPLETE: CPT

## 2021-08-19 PROCEDURE — 72146 MRI CHEST SPINE W/O DYE: CPT

## 2021-08-20 NOTE — PROGRESS NOTES
CARDIOLOGY CONSULTATION        Patient Name: Cesario Naqvi  Primary Care physician: JOB Smith CNP    Reason for Referral/Chief Complaint: Cesario Naqvi is a 49 y.o. patient who is referred to cardiology clinic today for evaluation and treatment of palpitations. History of Present Illness:   Cesario Naqvi 1970 has a prior medical history significant for pulmonary embolism (4/2021), GERD, and hypertension. She presented to ED 5/25/2021 with chest pain following PE in April. Troponin < 0.01. EKG showed sinus bradycardia 56 bpm.      Today,  she reports that she had Ovi&Ovi vaccine in March and then went on 16 hour drive to and from [place] 2 weeks following vaccination. She was on birth control at that time. She had chest tightness that began on the way back. Diagnosed with pulmonary embolism complicated with lung infarct and pleural effusion. Since this time, she complains of nagging back/flank discomfort. She reports still having back heaviness that radiates to the upper right chest heaviness. It's waxes and wanes in intensity. She notes palpitations/strong heart beats at night, mostly when she lies on the left side. She has hot flashes at that time. She notes fatigue. Denies dizziness, near-syncope or hayder syncope. Denies paroxysmal nocturnal dyspnea, orthopnea, bendopnea, increasing lower extremity edema or weight gain. The patient endorses highest level of activity as performing walks/run five miles for 55 minutes duration most days of the week. She denies any of the symptoms above while doing this activity/denies cardiac limitations to activity. Past Medical History:   has a past medical history of GERD (gastroesophageal reflux disease), Hypertension, PONV (postoperative nausea and vomiting), Pulmonary embolism ([address] and Seasonal allergies.     Surgical History:   has a past surgical history that includes [place] tooth extraction; Breast medications for this visit. Allergies:  Codeine, Decongestant [pseudoephedrine hcl], Norco [hydrocodone-acetaminophen], and Tramadol     Review of Systems:   A 14 point review of symptoms completed. Pertinent positives identified in the HPI, all other review of symptoms negative as below. Objective:     Vitals:    08/23/21 0855   BP: 112/62   Pulse: 83   SpO2: 98%    Weight: 161 lb (73 kg)       PHYSICAL EXAM:    General:  Alert, cooperative, no distress, appears stated age   Head:  Normocephalic, atraumatic   Eyes:  Conjunctiva/corneas clear, anicteric sclerae    Nose: Nares normal, no drainage or sinus tenderness   Throat: No abnormalities of the lips, oral mucosa or tongue. MMM   Neck: Trachea midline. Neck supple with no lymphadenopathy, thyroid not enlarged, symmetric, no tenderness/mass/nodules, no Jugular venous pressure elevation    Lungs:   Clear to auscultation bilaterally, no wheezes, no rales, no respiratory distress, diminished BS R base   Chest Wall:  No deformity or tenderness to palpation   Heart:  Regular rate and rhythm, normal S1, normal S2, no murmur, no rub, no S3/S4, PMI non-displaced. Abdomen:   Soft, non-tender, with normoactive bowel sounds. No masses, no hepatosplenomegaly   Extremities: No cyanosis, clubbing or pitting edema. Vascular: 2+ radial, 2+ dorsalis pedis and posterior tibial pulses bilaterally. Brisk carotid upstrokes without carotid bruit. Skin: Skin color, texture, turgor are normal with no rashes or ulceration. Pysch: Euthymic mood, appropriate affect   Neurologic: Oriented to person, place and time. No slurred speech or facial asymmetry. No motor or sensory deficits on gross examination.          Labs:   CBC:   Lab Results   Component Value Date    WBC 8.0 05/25/2021    RBC 4.19 05/25/2021    HGB 13.2 05/25/2021    HCT 38.9 05/25/2021    MCV 92.9 05/25/2021    RDW 12.9 05/25/2021     05/25/2021     CMP:  Lab Results   Component Value Date     05/25/2021    K 3.9 05/25/2021     05/25/2021    CO2 26 05/25/2021    BUN 15 05/25/2021    CREATININE 1.0 07/16/2021    CREATININE 0.9 05/25/2021    GFRAA >60 07/16/2021    AGRATIO 1.3 05/25/2021    LABGLOM 59 07/16/2021    GLUCOSE 107 05/25/2021    PROT 7.0 05/25/2021    CALCIUM 9.7 05/25/2021    BILITOT <0.2 05/25/2021    ALKPHOS 75 05/25/2021    AST 26 05/25/2021    ALT 23 05/25/2021     PT/INR:  No results found for: PTINR  HgBA1c:No results found for: LABA1C  Lab Results   Component Value Date    TROPONINI <0.01 05/25/2021     No results found for: CHOL  No results found for: TRIG  Lab Results   Component Value Date    HDL 68 (H) 08/06/2021     Lab Results   Component Value Date    LDLCALC 104 (H) 08/06/2021     Lab Results   Component Value Date    LABVLDL 27 08/06/2021     No results found for: [facility]        Cardiac Data:     EKG: Today, personally reviewed, my interpretation: Normal sinus rhythm, HR 70, Non-specific ST abnormality inferior/anterolateral leads. Compared with May, no signficant changes. Echo: 8/19/2021   Summary   -- Normal left ventricle systolic function with an estimated ejection   fraction of 55%. No regional wall motion abnormalities are seen. Normal left ventricular diastolic filling pressure. There is an aneurysmal interatrial septum with no evidence of shunting. (not seen well due to REGINALDO limitations, consider REGINALDO if indicated)   -- Mild mitral regurgitation. Trace pulmonic and tricuspid regurgitation. Additional studies:   50 Hr Monitor 7/12/2021  1. The rhythm was sinus. Average OR interval 0.16 average QRS duration 0.08.2. Diary returned with entries of \"hot flash\" and \"heart palpitations\" with no ectopy noted. Confirmed by Carley Clay MD, [address] ([id number]) on 7/15/2021 4:08:01 PM     Chest CT 7/16/2021  FINDINGS:   Pulmonary Arteries: Pulmonary arteries are adequately opacified for   evaluation.   No evidence of intraluminal filling defect to suggest pulmonary embolism. Main pulmonary artery is normal in caliber. Mediastinum: There is no pathologic lymphadenopathy within the chest or   mediastinum. The intrathoracic aorta is unremarkable, without evidence of   aneurysm or dissection. No pericardial abnormality is identified. Lungs/pleura: The central airways are patent. There is mild dependent   atelectasis within both lower lobes. There has been interval decrease in   size of a nodular focus of opacity within the posterior basal segment of the   right lower lobe, most consistent with an evolving pulmonary infarct. No   focus of acute airspace consolidation is identified. There is no evidence of   a pneumothorax or pleural effusion. There are a few stable subcentimeter   nodules throughout both lungs, the largest measuring approximately 3.5 mm   within the anterior right lower lobe, unchanged from 03/01/2016. No new   suspicious pulmonary nodule or mass is evident. Upper Abdomen: Limited images of the upper abdomen are unremarkable. Soft Tissues/Bones: There is mild degenerative change throughout the thoracic   spine. No osteolytic or osteoblastic lesion is seen. Impression   1. No CT evidence of a pulmonary embolism. 2. Evolving right lower lobe pulmonary infarct     BLE Venous Duplex: 5/7/2021   Normal venous duplex study of the bilateral lower extremities. There is no  evidence of deep or superficial venous thrombosis. CT Cardiac Calcium score: 3/1/2016  No definite Coronary artery calcium is identified. Relative risk is low. Calcium score is 0       Impression and Plan:      1. Back pain/atypical chest pain,suspected related to #3  2. Palipitations  3. History of pulmonary embolism, provoke, complicated with pulmonary infarction/pleural effusion  4. Family history of premature onset CAD in her father   11. Fatigue  6.  Hypertension     The 10-year ASCVD risk score (Miguel Ramos, et al., 2013) is: 0.7%    Values used to calculate the score:      Age: 49 years      Sex: Female      Is Non- : No      Diabetic: No      Tobacco smoker: No      Systolic Blood Pressure: 335 mmHg      Is BP treated: No      HDL Cholesterol: 68 mg/dL      Total Cholesterol: 199 mg/dL    Patient Active Problem List   Diagnosis    Essential hypertension    Right upper quadrant abdominal pain    Abnormal mammogram    Pulmonary embolism (HCC)    Pulmonary embolism and infarction (HCC)    Palpitation    History of pulmonary embolism       PLAN:  1. Pulmonary medicine referral placed for formal assessment  2. Recommend trial off of Metoprolol at this time given fatigue and no strong indication from cardiac standpoint. If BP persistently > 140/90 consider ACE inhibitor or calcium channel blocker for BP management. 3. We encouraged modest weight loss through implementing appropriate dietary measures as well as initiation of a graded exercise program with the ultimate goal of 150 minutes of aerobic exercise weekly. 4. No need for further cardiac testing at this time. 5. Continue on Eliquis for VTE history. Off oral contraceptives. 6. Recommend further workup for hot flashes/fatigue/palpitations: Thyroid studies and menopause evaluation. Follow as needed. This note was scribed in the presence of Perla Jones MD by India Latham RN. The scribes documentation has been prepared under my direction and personally reviewed by me in its entirety. I confirm that the note above accurately reflects all work, treatment, procedures, and medical decision making performed by me. Alyssa Washington MD, personally performed the services described in this documentation as scribed by India Latham RN in my presence, and it is both accurate and complete to the best of our ability. I will address the patient's cardiac risk factors and adjusted pharmacologic treatment as needed.  In addition, I have reinforced the need for patient directed risk factor modification. All questions and concerns were addressed to the patient/family. Alternatives to my treatment were discussed. Thank you for allowing us to participate in the care of Elham Rogel. Please call me with any questions [phone].     Huong Parkinson MD, [facility]  Cardiovascular Disease  [facility]  [phone] [address] [facility]  [phone] [address]  8/23/2021 9:32 AM

## 2021-08-21 DIAGNOSIS — M54.6 ACUTE MIDLINE THORACIC BACK PAIN: ICD-10-CM

## 2021-08-23 ENCOUNTER — OFFICE VISIT (OUTPATIENT)
Dept: CARDIOLOGY CLINIC | Age: 51
End: 2021-08-23
Payer: OTHER GOVERNMENT

## 2021-08-23 VITALS
WEIGHT: 161 LBS | DIASTOLIC BLOOD PRESSURE: 62 MMHG | OXYGEN SATURATION: 98 % | HEIGHT: 69 IN | HEART RATE: 83 BPM | SYSTOLIC BLOOD PRESSURE: 112 MMHG | BODY MASS INDEX: 23.85 KG/M2

## 2021-08-23 DIAGNOSIS — R00.2 PALPITATION: Primary | ICD-10-CM

## 2021-08-23 DIAGNOSIS — I10 ESSENTIAL HYPERTENSION: ICD-10-CM

## 2021-08-23 DIAGNOSIS — Z86.711 HISTORY OF PULMONARY EMBOLISM: ICD-10-CM

## 2021-08-23 PROCEDURE — 93000 ELECTROCARDIOGRAM COMPLETE: CPT | Performed by: INTERNAL MEDICINE

## 2021-08-23 PROCEDURE — 99204 OFFICE O/P NEW MOD 45 MIN: CPT | Performed by: INTERNAL MEDICINE

## 2021-08-23 RX ORDER — GABAPENTIN 600 MG/1
TABLET ORAL
Qty: 90 TABLET | Refills: 0 | Status: SHIPPED | OUTPATIENT
Start: 2021-08-23 | End: 2022-03-01

## 2021-08-26 ENCOUNTER — OFFICE VISIT (OUTPATIENT)
Dept: ORTHOPEDIC SURGERY | Age: 51
End: 2021-08-26
Payer: OTHER GOVERNMENT

## 2021-08-26 ENCOUNTER — OFFICE VISIT (OUTPATIENT)
Dept: PULMONOLOGY | Age: 51
End: 2021-08-26
Payer: OTHER GOVERNMENT

## 2021-08-26 VITALS
RESPIRATION RATE: 16 BRPM | BODY MASS INDEX: 23.7 KG/M2 | HEART RATE: 83 BPM | DIASTOLIC BLOOD PRESSURE: 60 MMHG | OXYGEN SATURATION: 96 % | HEIGHT: 69 IN | WEIGHT: 160 LBS | SYSTOLIC BLOOD PRESSURE: 112 MMHG | TEMPERATURE: 98.3 F

## 2021-08-26 VITALS — BODY MASS INDEX: 23.85 KG/M2 | WEIGHT: 161 LBS | HEIGHT: 69 IN

## 2021-08-26 DIAGNOSIS — M89.8X1 PAIN OF LEFT SCAPULA: ICD-10-CM

## 2021-08-26 DIAGNOSIS — M54.6 THORACIC SPINE PAIN: Primary | ICD-10-CM

## 2021-08-26 DIAGNOSIS — I26.99 OTHER ACUTE PULMONARY EMBOLISM WITHOUT ACUTE COR PULMONALE (HCC): Primary | ICD-10-CM

## 2021-08-26 DIAGNOSIS — I26.99 PULMONARY INFARCT (HCC): ICD-10-CM

## 2021-08-26 DIAGNOSIS — R07.81 PLEURODYNIA: ICD-10-CM

## 2021-08-26 PROCEDURE — 99213 OFFICE O/P EST LOW 20 MIN: CPT | Performed by: INTERNAL MEDICINE

## 2021-08-26 PROCEDURE — 99204 OFFICE O/P NEW MOD 45 MIN: CPT | Performed by: PHYSICIAN ASSISTANT

## 2021-08-26 NOTE — PATIENT INSTRUCTIONS
Remember to bring a list of pulmonary medications and any CPAP or BiPAP machines to your next appointment with the office. Please keep all of your future appointments scheduled by [address], [facility] office. Out of respect for other patients and providers, you may be asked to reschedule your appointment if you arrive later than your scheduled appointment time. Appointments cancelled less than 24hrs in advance will be considered a no show. Patients with three missed appointments within 1 year or four missed appointments within 2 years can be dismissed from the practice. Please be aware that our physicians are required to work in the Intensive Care Unit at [facility].  Your appointment may need to be rescheduled if they are designated to work during your appointment time. You may receive a survey regarding the care you received during your visit. Your input is valuable to us. We encourage you to complete and return your survey. We hope you will choose us in the future for your healthcare needs. Pt instructed of all future appointment dates & times, including radiology, labs, procedures & referrals. If procedures were scheduled preparation instructions provided. Instructions on future appointments with [facility] were given.

## 2021-08-26 NOTE — PROGRESS NOTES
New Patient: Thoracic SPINE    Referring Provider:  JOB Jean -*    CHIEF COMPLAINT:    Chief Complaint   Patient presents with    Back Pain     thoracic       HISTORY OF PRESENT ILLNESS:       Ms. Yoselin Cavanaugh  is a pleasant 49 y.o. female here for consultation regarding her thoracic spine pain. She states her pain began shortly after receiving the [place] vaccine but she states that she also went on a very long car ride along with being on birth control pills. She states that she developed a pulmonary embolus with subsequent pleurisy and pneumonia. She continues to have mid T-spine pain that radiates into her left shoulder blade around into her armpit. She has been on 2 rounds of prednisone which she states gives her significant benefit until she stops the prednisone and she has been on gabapentin 600 mg twice daily. . She rates her back pain 5/10 without radiation into either upper or lower extremities. She describes the pain as constant with a feeling of tightness. Pain is worse with prolonged sitting and any pressure on her back as if lying on her back or up against a chair back and improved some with standing, changing positions, and slightly leaning forward. She denies any paresthesias or weakness into either upper or lower extremities. She denies any bowel or bladder dysfunction. The pain at times disrupts her sleep due to positioning.    Pain Assessment  Location of Pain: Back  Severity of Pain: 5  Quality of Pain: Other (Comment)]    Current/Past Treatment:   · Physical Therapy: None  · Chiropractic: None  · Injection: None  · Medications: Gabapentin and previous prednisone taper    Past Medical History:   Past Medical History:   Diagnosis Date    GERD (gastroesophageal reflux disease)     Hypertension     PONV (postoperative nausea and vomiting)     Pulmonary embolism ([facility] [address] 04/20/2021    Seasonal allergies         Past Surgical History:     Past Surgical History: Procedure Laterality Date    BREAST BIOPSY Right 12/13/2017     TWO SITE RIGHT NEEDLE LOCALIZED EXCISIONAL BREAST BIOPSY    COLONOSCOPY N/A 12/7/2020    COLONOSCOPY DIAGNOSTIC performed by Crystal Drummond MD at [address] [place] [address] Drive EXTRACTION         Current Medications:     Current Outpatient Medications:     gabapentin (NEURONTIN) 600 MG tablet, TAKE 1 TABLET BY MOUTH THREE TIMES DAILY (Patient taking differently: TAKE 1 TABLET BY MOUTH DAILY), Disp: 90 tablet, Rfl: 0    Lactobacillus (PROBIOTIC ACIDOPHILUS PO), Take by mouth, Disp: , Rfl:     apixaban starter pack (ELIQUIS) 5 MG TBPK tablet, Take 1 tablet by mouth See Admin Instructions 10 mg twice per day for 7 days, then 5 mg twice per day for 6 months, Disp: 74 tablet, Rfl: 5    OMEGA 3-6-9 FATTY ACIDS PO, Take by mouth, Disp: , Rfl:     Multiple Vitamins-Minerals (THERAPEUTIC MULTIVITAMIN-MINERALS) tablet, Take 1 tablet by mouth daily, Disp: , Rfl:     vitamin B-12 (CYANOCOBALAMIN) 100 MCG tablet, Take 50 mcg by mouth daily, Disp: , Rfl:     loratadine (CLARITIN) 10 MG tablet, Take 10 mg by mouth daily, Disp: , Rfl:     Allergies:  Codeine, Decongestant [pseudoephedrine hcl], Norco [hydrocodone-acetaminophen], and Tramadol    Social History:    reports that she has never smoked. She has never used smokeless tobacco. She reports that she does not drink alcohol and does not use drugs.     Family History:   Family History   Problem Relation Age of Onset    No Known Problems Mother     Heart Disease Father 52        CAD, hx of CABG    Colon Cancer Brother     No Known Problems Brother     Diabetes Paternal Aunt     Diabetes Paternal Uncle        REVIEW OF SYSTEMS: Full ROS noted & scanned   CONSTITUTIONAL: Denies unexplained weight loss, fevers, chills or fatigue  NEUROLOGICAL: Denies unsteady gait or progressive weakness  MUSCULOSKELETAL: Denies joint swelling or redness  PSYCHOLOGICAL: Denies anxiety, depression   SKIN: Denies skin changes, delayed healing, rash, itching   HEMATOLOGIC: Denies easy bleeding or bruising  ENDOCRINE: Denies excessive thirst, urination, heat/cold  RESPIRATORY: Denies current dyspnea, cough  GI: Denies nausea, vomiting, diarrhea   : Denies bowel or bladder issues      PHYSICAL EXAM:    Vitals: Height 5' 9.02\" (1.753 m), weight 161 lb (73 kg), last menstrual period 08/12/2021, not currently breastfeeding. GENERAL EXAM:  · General Apparence: Patient is adequately groomed with no evidence of malnutrition. · Orientation: The patient is oriented to time, place and person. · Mood & Affect:The patient's mood and affect are appropriate. · Vascular: Examination reveals no swelling tenderness in upper or lower extremities. Good capillary refill. · Lymphatic: The lymphatic examination bilaterally reveals all areas to be without enlargement or induration  · Sensation: Sensation is intact without deficit  · Coordination/Balance: Good coordination. Thoracic spine EXAMINATION:  · Inspection: Local inspection shows no step-off or bruising. Thoracic alignment is normal.  Sagittal and Coronal balance is neutral.      · Palpation:   No evidence of tenderness at the midline. She is tender to palpation over the mid thoracic spine on the left and along her scapula into her armpit. There is no tenderness bilaterally at the paraspinal or trochanters. There is no step-off or paraspinal spasm. · Range of Motion: Thoracic flexion, extension and rotation are mildly limited due to pain. · Strength:   Strength testing is 5/5 in all muscle groups tested. · Special Tests:   Straight leg raise and crossed SLR negative. Leg length and pelvis level. · Skin: There are no rashes, ulcerations or lesions. · Reflexes: Reflexes are symmetrically 2+ at the patellar and ankle tendons. Clonus absent bilaterally at the feet.   · Gait & station: normal, patient ambulates without assistance    · Additional Examinations:   · RIGHT LOWER EXTREMITY: Inspection/examination of the right lower extremity does not show any tenderness, deformity or injury. Range of motion is unremarkable. There is no gross instability. There are no rashes, ulcerations or lesions. Strength and tone are normal.  · LEFT LOWER EXTREMITY:  Inspection/examination of the left lower extremity does not show any tenderness, deformity or injury. Range of motion is unremarkable. There is no gross instability. There are no rashes, ulcerations or lesions. Strength and tone are normal.    Diagnostic Testing:    MRI of the thoracic spine that was obtained on 8/19/2021 was reviewed with the patient today which shows  No significant spinal canal stenosis or neuroforaminal narrowing. There is normal alignment of the spine and the vertebral body heights are maintained    Impression:   Status post bilateral pulmonary embolus  Thoracic spine pain    Plan:      · We discussed the diagnosis and treatment options including observation, additional oral steroids, physical therapy, epidural injections and additional imaging. She wishes to proceed with outpatient physical therapy for the thoracic spine with modalities. She may also try chiropractic care. If she finds that she has had no significant improvement from outpatient physical therapy she will contact the office for referral to [address] for potential trigger point injections. Follow up -as needed      Patient examined and note dictated by Ho Gramajo PA-C. Patient also seen and examined by Dr. Pa Hurt.

## 2021-08-26 NOTE — PROGRESS NOTES
Pulmonary Outpatient Note   Arpan Stone MD       8/26/2021    1. Other acute pulmonary embolism without acute cor pulmonale ([facility] [address]    2. Pulmonary infarct ([facility] [address]    3. Pleurodynia          ASSESSMENT/PLAN:  Pulmonary embolism, pulmonary infarction. CT images shown to the patient. This is the natural course of events. I have recommended completing 6 months of anticoagulation, going off anticoagulation for at least a couple of weeks and then performing a hypercoagulable profile. She expressed understanding and agrees  Pleurodynia, left scapular pain. She appears to have pain on the left, while the infarct was on the right. CT did show small clots in the left pulmonary vasculature as well. She has localized tenderness along the scapula, this is more of a musculoskeletal problem. She is due to see an orthopedic surgeon. Prophylaxis. She has received both doses of her COVID-19 vaccine. Recommend flu shot annually. RTC after performing blood work for hypercoagulable state, call earlier if symptomatic      Orders Placed This Encounter   Procedures    FACTOR 5 LEIDEN    ANTITHROMBIN III ACTIVITY    PROTHROMBIN GENE MUTATION    Protein C Functional    PROTEIN S FUNCTIONAL    HOMOCYSTEINE, SERUM       No follow-ups on file. Chief Complaint:   Follow-up (hx pulm. embolism)       HPI: Flora Lei is a 49y.o. year old female here for follow up. Her PCP is Rodney Acuña. [place] was seen by Dr. Kristin Hudson on 5/25/2021 for pulmonary embolism related to possible combination of travel, receiving the [employer] COVID-19 vaccine and oral contraceptive exposure. She had pleurodynia. The patient had called for left-sided pleuritic chest pain, had a right-sided infarct. Repeat CT chest was done on 7/16/2021. Her other medical problems include hypertension, GERD and seasonal allergic rhinitis. The patient has had atypia on her right breast lesion.     The patient lives in [facility], she is a non-smoker, 5    OMEGA 3-6-9 FATTY ACIDS PO, Take by mouth, Disp: , Rfl:     Multiple Vitamins-Minerals (THERAPEUTIC MULTIVITAMIN-MINERALS) tablet, Take 1 tablet by mouth daily, Disp: , Rfl:     vitamin B-12 (CYANOCOBALAMIN) 100 MCG tablet, Take 50 mcg by mouth daily, Disp: , Rfl:     loratadine (CLARITIN) 10 MG tablet, Take 10 mg by mouth daily, Disp: , Rfl:     Codeine, Decongestant [pseudoephedrine hcl], Norco [hydrocodone-acetaminophen], and Tramadol    Vitals:    08/26/21 0832   BP: 112/60   Site: Right Upper Arm   Position: Sitting   Cuff Size: Medium Adult   Pulse: 83   Resp: 16   Temp: 98.3 °F (36.8 °C)   TempSrc: Oral   SpO2: 96%   Weight: 160 lb (72.6 kg)   Height: 5' 9\" (1.753 m)       Review of Systems   Musculoskeletal:        Scapular pain   Psychiatric/Behavioral: Positive for behavioral problems. All other systems reviewed and are negative. Physical Exam  Vitals reviewed. Constitutional:       General: She is not in acute distress. Appearance: Normal appearance. She is well-developed. She is not ill-appearing, toxic-appearing or diaphoretic. Comments: Very pleasant,   HENT:      Head: Normocephalic and atraumatic. Nose: Nose normal. No congestion or rhinorrhea. Mouth/Throat:      Mouth: Mucous membranes are moist.      Pharynx: Oropharynx is clear. No oropharyngeal exudate or posterior oropharyngeal erythema. Comments: Class II airway  Eyes:      General: No scleral icterus. Right eye: No discharge. Left eye: No discharge. Conjunctiva/sclera: Conjunctivae normal.      Pupils: Pupils are equal, round, and reactive to light. Neck:      Thyroid: No thyromegaly. Vascular: No JVD. Trachea: No tracheal deviation. Cardiovascular:      Rate and Rhythm: Normal rate and regular rhythm. Heart sounds: Normal heart sounds. No murmur heard. No friction rub. No gallop. Pulmonary:      Effort: Pulmonary effort is normal. No respiratory distress. Breath sounds: Normal breath sounds. No stridor. No wheezing or rales. Abdominal:      Palpations: Abdomen is soft. Musculoskeletal:      Right lower leg: No edema. Left lower leg: No edema. Comments: Tenderness in the rhomboid along the medial scapula on the left   Lymphadenopathy:      Cervical: No cervical adenopathy. Skin:     General: Skin is warm and dry. Coloration: Skin is not jaundiced or pale. Findings: No bruising, erythema, lesion or rash. Neurological:      Mental Status: She is alert and oriented to person, place, and time.       Gait: Gait normal.   Psychiatric:         Mood and Affect: Mood normal.         Behavior: Behavior normal.

## 2021-08-26 NOTE — PROGRESS NOTES
MA Communication:   The following orders are received by verbal communication from Allie Garcia MD    Orders include:  Lab work 2 rd wk Nov       FU OV3 wk Nov

## 2021-09-16 ENCOUNTER — HOSPITAL ENCOUNTER (OUTPATIENT)
Dept: MAMMOGRAPHY | Age: 51
Discharge: HOME | End: 2021-09-16
Payer: OTHER GOVERNMENT

## 2021-09-16 VITALS — BODY MASS INDEX: 22.96 KG/M2 | WEIGHT: 155 LBS | HEIGHT: 69 IN

## 2021-09-16 DIAGNOSIS — Z12.31 VISIT FOR SCREENING MAMMOGRAM: ICD-10-CM

## 2021-09-16 DIAGNOSIS — R92.1 BREAST CALCIFICATION, RIGHT: Primary | ICD-10-CM

## 2021-09-16 PROCEDURE — 77063 BREAST TOMOSYNTHESIS BI: CPT

## 2021-09-17 ENCOUNTER — TELEPHONE (OUTPATIENT)
Dept: PULMONOLOGY | Age: 51
End: 2021-09-17

## 2021-09-17 NOTE — TELEPHONE ENCOUNTER
Henrietta from Dr. Guthrie Class office ([facility]). Pt came to them this week and needs to have a \"sterio biopsy\". Pt is currently on Eliquis. Pt told them she has 1 more month on the medication. They want to know if pt can come off early and how long she would need to be off before having procedure. Or, should pt wait until she completes the Eliquis until complete, and if so, when could they schedule the biopsy for? Please advise.

## 2021-09-18 NOTE — TELEPHONE ENCOUNTER
Would consider stopping Eliquis 48 hours before the biopsy, holding it for about 72 hours after the biopsy, with close follow-up to look for bleeding after the biopsy. If no bleeding, Eliquis can be resumed. Please send my note to their office.

## 2021-09-21 ENCOUNTER — TELEPHONE (OUTPATIENT)
Dept: PULMONOLOGY | Age: 51
End: 2021-09-21

## 2021-09-21 NOTE — TELEPHONE ENCOUNTER
Henrietta from Dr. Claire Hess office called to just verify that Dr. Errol Paulino is OK with stopping the Eliquis now or does she need to wait an additional month?   Please advise    [phone] (p)  [phone] (f)

## 2021-09-22 DIAGNOSIS — M54.6 ACUTE MIDLINE THORACIC BACK PAIN: ICD-10-CM

## 2021-09-22 RX ORDER — GABAPENTIN 600 MG/1
TABLET ORAL
Qty: 90 TABLET | Refills: 0 | OUTPATIENT
Start: 2021-09-22

## 2021-09-22 NOTE — TELEPHONE ENCOUNTER
Refill Request     Last Seen: Last Seen Department: 8/4/2021  Last Seen by PCP: Visit date not found    Last Written: 8/23/21    Next Appointment:   Future Appointments   Date Time Provider Isi Spaulding   11/18/2021  8:30 AM Jamila Roach MD [facility]       When is pt due back?       Requested Prescriptions     Pending Prescriptions Disp Refills    gabapentin (NEURONTIN) 600 MG tablet [Pharmacy Med Name: GABAPENTIN 600MG TABLETS] 90 tablet 0     Sig: TAKE 1 TABLET BY MOUTH THREE TIMES DAILY

## 2021-09-24 DIAGNOSIS — F40.298 FEAR OF TESTS: Primary | ICD-10-CM

## 2021-09-24 RX ORDER — DIAZEPAM 2 MG/1
2 TABLET ORAL ONCE
Qty: 1 TABLET | Refills: 0 | Status: SHIPPED | OUTPATIENT
Start: 2021-09-24 | End: 2021-09-24

## 2021-11-12 DIAGNOSIS — I10 ESSENTIAL HYPERTENSION: ICD-10-CM

## 2021-11-12 DIAGNOSIS — I26.99 OTHER ACUTE PULMONARY EMBOLISM WITHOUT ACUTE COR PULMONALE (HCC): ICD-10-CM

## 2021-11-12 LAB — HOMOCYSTEINE: 7 UMOL/L (ref 0–10)

## 2021-11-13 LAB
CREAT SERPL-MCNC: 0.8 MG/DL (ref 0.6–1.2)
GFR AFRICAN AMERICAN: 99
GFR NON-AFRICAN AMERICAN: 86

## 2021-11-16 LAB
PROTEIN C FUNCTIONAL: 121 % (ref 83–168)
PROTEIN S, FUNCTIONAL: 101 % (ref 57–131)

## 2021-11-18 ENCOUNTER — OFFICE VISIT (OUTPATIENT)
Dept: PULMONOLOGY | Age: 51
End: 2021-11-18
Payer: OTHER GOVERNMENT

## 2021-11-18 VITALS
SYSTOLIC BLOOD PRESSURE: 118 MMHG | OXYGEN SATURATION: 97 % | RESPIRATION RATE: 18 BRPM | HEIGHT: 69 IN | WEIGHT: 158 LBS | BODY MASS INDEX: 23.4 KG/M2 | TEMPERATURE: 97.9 F | HEART RATE: 71 BPM | DIASTOLIC BLOOD PRESSURE: 64 MMHG

## 2021-11-18 DIAGNOSIS — R07.81 PLEURITIC CHEST PAIN: ICD-10-CM

## 2021-11-18 DIAGNOSIS — I26.99 PULMONARY EMBOLISM AND INFARCTION (HCC): Primary | ICD-10-CM

## 2021-11-18 LAB — AT-III ACTIVITY: 92 % (ref 83–122)

## 2021-11-18 PROCEDURE — 99214 OFFICE O/P EST MOD 30 MIN: CPT | Performed by: INTERNAL MEDICINE

## 2021-11-18 ASSESSMENT — SLEEP AND FATIGUE QUESTIONNAIRES
HOW LIKELY ARE YOU TO NOD OFF OR FALL ASLEEP WHILE SITTING AND TALKING TO SOMEONE: 0
HOW LIKELY ARE YOU TO NOD OFF OR FALL ASLEEP WHILE WATCHING TV: 1
HOW LIKELY ARE YOU TO NOD OFF OR FALL ASLEEP WHILE SITTING AND READING: 2
HOW LIKELY ARE YOU TO NOD OFF OR FALL ASLEEP WHEN YOU ARE A PASSENGER IN A CAR FOR AN HOUR WITHOUT A BREAK: 0
ESS TOTAL SCORE: 5
HOW LIKELY ARE YOU TO NOD OFF OR FALL ASLEEP IN A CAR, WHILE STOPPED FOR A FEW MINUTES IN TRAFFIC: 0
NECK CIRCUMFERENCE (INCHES): 15
HOW LIKELY ARE YOU TO NOD OFF OR FALL ASLEEP WHILE SITTING QUIETLY AFTER LUNCH WITHOUT ALCOHOL: 0
HOW LIKELY ARE YOU TO NOD OFF OR FALL ASLEEP WHILE SITTING INACTIVE IN A PUBLIC PLACE: 0
HOW LIKELY ARE YOU TO NOD OFF OR FALL ASLEEP WHILE LYING DOWN TO REST IN THE AFTERNOON WHEN CIRCUMSTANCES PERMIT: 2

## 2021-11-18 NOTE — PATIENT INSTRUCTIONS
Remember to bring a list of pulmonary medications and any CPAP or BiPAP machines to your next appointment with the office. Please keep all of your future appointments scheduled by [address]. Out of respect for other patients and providers, you may be asked to reschedule your appointment if you arrive later than your scheduled appointment time. Appointments cancelled less than 24hrs in advance will be considered a no show. Patients with three missed appointments within 1 year or four missed appointments within 2 years can be dismissed from the practice. Please be aware that our physicians are required to work in the Intensive Care Unit at [facility].  Your appointment may need to be rescheduled if they are designated to work during your appointment time. You may receive a survey regarding the care you received during your visit. Your input is valuable to us. We encourage you to complete and return your survey. We hope you will choose us in the future for your healthcare needs. Pt instructed of all future appointment dates & times, including radiology, labs, procedures & referrals. If procedures were scheduled preparation instructions provided. Instructions on future appointments with [facility] were given.

## 2021-11-18 NOTE — PROGRESS NOTES
MA Communication:   The following orders are received by verbal communication from Opal Estrada MD    Orders include: VQ scan        FU OV

## 2021-11-18 NOTE — PROGRESS NOTES
C/O Pulmonary evaluation and discuss test results      HPI: Patient is a 52-year-old female who has come to the office for a pulmonary evaluation and to discuss the test results, patient had developed acute pulmonary bolus him with infarction secondary to receiving J&[employer] Covid vaccine and also traveling to [place] by car and patient has been on oral contraceptive pill for which she was treated, patient continues to be on Eliquis at this time, patient on questioning further states that she has been having some persistent left infrascapular pain for which she has had evaluations done including MRI without any improvement, patient on questioning further states that she does not have any chest pain or discomfort on the right side, patient does not have any increasing cough expectoration shortness of breath or wheezing, patient does not have any significant nasal congestion sinus congestion postnasal drainage, no epistaxis or hemoptysis, patient does not have any sore throat or difficulty in swallowing, patient does not have any significant abdominal pain nausea vomiting or any reflux symptoms, patient states that she does not have any dysuria hematuria or any abnormal bleeding per vagina, patient states that her brother had colon cancer and she has a recent colonoscopy which was negative, patient's Pap smear have been negative, patient also had a breast evaluation done and there was a question of breast lump and biopsy was done which showed some atypia but otherwise was negative, patient does not have any significant change in the ambient environment or sick contacts, no change in weight or appetite, patient has had issues with low blood pressure after she have a started on Eliquis, patient does not have any significant family history of hypercoagulable state, patient does not have any history of collagen vascular issues, no other pertinent review of system of concern              Review of Systems same as above Physical Exam:  Blood pressure 118/64, pulse 71, temperature 97.9 °F (36.6 °C), temperature source Temporal, resp. rate 18, height 5' 8.5\" (1.74 m), weight 158 lb (71.7 kg), last menstrual period 11/11/2021, SpO2 97 %, not currently breastfeeding.'  Constitutional:  No acute distress. HENT:  Oropharynx is clear and moist. Nothyromegaly. Eyes:  Conjunctivae are normal. Pupils equal, round, and reactive to light. No scleral icterus. Neck: . No tracheal deviation present. No obviousthyroid mass. Cardiovascular: Normal rate, regular rhythm, normal heart sounds. No right ventricular heave. No lower extremity edema. Pulmonary/Chest: No wheezes. No rales. Chest wall is not dull to percussion. No accessory muscle usage or stridor. Abdominal: Soft. Bowel sounds present. No distension or hernia. No tenderness. Musculoskeletal: No cyanosis. No clubbing. No obvious joint deformity. Lymphadenopathy: No cervical or supraclavicularadenopathy. Skin: Skin is warm and dry. No rash or nodules on the exposed extremities. Psychiatric: Normal mood and affect. Behavior is normal.  No anxiety. Neurologic : Alert, awake and oriented. PERRL. Speechfluent      Sleep Medicine Data:  Sitting and reading: Moderate chance of dozing  Watching TV: Slight chance of dozing  Sitting, inactive in a public place (e.g. a theatre or a meeting): Would never doze  As a passenger in a car for an hour without a break: Would never doze  Lying down to rest in the afternoon when circumstances permit: Moderate chance of dozing  Sitting and talking to someone: Would never doze  Sitting quietly after a lunch without alcohol: Would never doze  In a car, while stopped for a few minutes in traffic: Would never doze  Total score: 5  Neck circumference: 15        Data:     Imaging:  I have reviewed radiology images personally.   NM LUNG SCAN PERFUSION ONLY    (Results Pending)     CTA OF THE CHEST 7/16/2021 2:01 pm       TECHNIQUE:   CTA of Limited images of the upper abdomen are unremarkable.       Soft Tissues/Bones: There is mild degenerative change throughout the thoracic   spine.  No osteolytic or osteoblastic lesion is seen.           Impression   1. No CT evidence of a pulmonary embolism. 2. Evolving right lower lobe pulmonary infarct. MRI OF THE THORACIC SPINE WITHOUT CONTRAST  8/19/2021 11:58 am       TECHNIQUE:   Multiplanar multisequence MRI of the thoracic spine was performed without the   administration of intravenous contrast.       COMPARISON:   None.       HISTORY:   ORDERING SYSTEM PROVIDED HISTORY: DDD (degenerative disc disease), thoracic   TECHNOLOGIST PROVIDED HISTORY:   Is the patient pregnant?->No   Reason for Exam: upper back pain, DDD   Acuity: Acute   Type of Exam: Initial       FINDINGS:   BONES/ALIGNMENT: There is normal alignment of the spine. The vertebral body   heights are maintained. The bone marrow signal appears unremarkable.       SPINAL CORD: No abnormal cord signal is seen.       SOFT TISSUES: No paraspinal mass identified.       DEGENERATIVE CHANGES: No significant spinal canal stenosis or neural   foraminal narrowing of the thoracic spine.           Impression   Unremarkable MRI of the thoracic spine         BILATERAL DIGITAL SCREENING MAMMOGRAM WITH CAD, DIGITAL BREAST TOMOSYNTHESIS       COMPARISON: None       Breast complaints: None   Lifetime risk: 16.8%       FINDINGS:       Breast stroma is heterogeneously dense, which can obscure small nodules.  Mammography is less sensitive for the detection of carcinoma in dense breasts.       RIGHT BREAST:   Biopsy clip noted inferior outer quadrant 5.8 cm posterior the nipple, stable.       Clustered grouping of microcalcifications in the right breast, along the equator in the inner quadrant, 3:00 position, new from prior study. Although these could represent benign fibroadenoma, biopsy is recommended.       Axilla: No lesions identified.  Microclip is noted in the right axilla.       SKIN: Normal       LEFT BREAST: Unremarkable. No direct or indirect signs of malignancy. No interval changes. Axilla: Normal       SKIN: Normal       Digital tomosynthesis was utilized during interpretation.           Impression       Indeterminate right breast calcifications in the 3:00 position, new from prior study. Stereotactic breast biopsy recommended           Assessment:    1. Pulmonary embolism and infarction ([address]    - NM LUNG SCAN PERFUSION ONLY; Future    2. Pleuritic chest pain    - NM LUNG SCAN PERFUSION ONLY;  Future        Plan:   · Patient's review of system were discussed  · Patient was told about the clinical finding including auscultation and implications and patient does not have any adventitious sounds on auscultation of concern  · Patient continues to have left intrascapular pain which may not be coming from the lung cavity pleural surfaces  · Patient was shown the serial CT scan of the chest along with findings/interpretation along with implications and options  · Patient's hypercoagulable work-up was so far negative  · Patient appears to have a provoked pulmonary embolism with infarction which may have contributed to her symptomatology initially but patient's symptoms are out of proportion to the imaging which has been done so far  · ID speaking patient needs to take education for 6 months for provoked event but patient has been skeptical about discontinuation  · We will subject the patient to a VQ scan to make sure that patient does not have any chronic thromboembolic phenomena  · Patient can discuss with PCP about muscle relaxants and local analgesics like icy hot or Biofreeze and if patient continues to have pain then patient may require evaluation by pain specialist  · Patient was asking about getting a booster dose for COVID-19 as she had 1 shot of J&J-patient was told about that her current status is not a contraindication  · Patient's further management depending on patient clinical status and the follow-up on above recommendations along with test results

## 2021-11-19 LAB
PROTHROMBIN G20210A MUTATION: NEGATIVE
PT PCR SPECIMEN: NORMAL

## 2021-11-20 LAB
FACTOR V LEIDEN: NEGATIVE
SPECIMEN: NORMAL

## 2021-11-23 ENCOUNTER — HOSPITAL ENCOUNTER (OUTPATIENT)
Dept: NUCLEAR MEDICINE | Age: 51
Discharge: HOME | End: 2021-11-23
Payer: OTHER GOVERNMENT

## 2021-11-23 ENCOUNTER — HOSPITAL ENCOUNTER (OUTPATIENT)
Dept: GENERAL RADIOLOGY | Age: 51
Discharge: HOME | End: 2021-11-23
Payer: OTHER GOVERNMENT

## 2021-11-23 DIAGNOSIS — R07.81 PLEURITIC CHEST PAIN: ICD-10-CM

## 2021-11-23 DIAGNOSIS — I26.99 PULMONARY EMBOLISM AND INFARCTION (HCC): ICD-10-CM

## 2021-11-23 DIAGNOSIS — R07.81 CHEST PAIN, PLEURITIC: ICD-10-CM

## 2021-11-23 PROCEDURE — 3430000000 HC RX DIAGNOSTIC RADIOPHARMACEUTICAL: Performed by: INTERNAL MEDICINE

## 2021-11-23 PROCEDURE — 71046 X-RAY EXAM CHEST 2 VIEWS: CPT

## 2021-11-23 PROCEDURE — A9558 XE133 XENON 10MCI: HCPCS | Performed by: INTERNAL MEDICINE

## 2021-11-23 PROCEDURE — A9540 TC99M MAA: HCPCS | Performed by: INTERNAL MEDICINE

## 2021-11-23 PROCEDURE — 78582 LUNG VENTILAT&PERFUS IMAGING: CPT

## 2021-11-23 RX ORDER — XENON XE-133 10 MCI/1
21.5 GAS RESPIRATORY (INHALATION)
Status: COMPLETED | OUTPATIENT
Start: 2021-11-23 | End: 2021-11-23

## 2021-11-23 RX ADMIN — XENON XE-133 21.5 MILLICURIE: 10 GAS RESPIRATORY (INHALATION) at 09:53

## 2021-11-23 RX ADMIN — Medication 5.8 MILLICURIE: at 09:55

## 2021-12-07 ENCOUNTER — OFFICE VISIT (OUTPATIENT)
Dept: PULMONOLOGY | Age: 51
End: 2021-12-07
Payer: OTHER GOVERNMENT

## 2021-12-07 VITALS
HEART RATE: 78 BPM | WEIGHT: 158 LBS | TEMPERATURE: 97.6 F | RESPIRATION RATE: 18 BRPM | OXYGEN SATURATION: 100 % | DIASTOLIC BLOOD PRESSURE: 66 MMHG | HEIGHT: 69 IN | SYSTOLIC BLOOD PRESSURE: 133 MMHG | BODY MASS INDEX: 23.4 KG/M2

## 2021-12-07 DIAGNOSIS — I26.99 PULMONARY EMBOLISM AND INFARCTION (HCC): Primary | ICD-10-CM

## 2021-12-07 PROCEDURE — 99213 OFFICE O/P EST LOW 20 MIN: CPT | Performed by: INTERNAL MEDICINE

## 2021-12-07 NOTE — PROGRESS NOTES
C/O Pulmonary evaluation and discuss test results     Patient has come to the office for a pulmonary follow-up and to discuss the test results and further management, patient states that she feels great, patient does not have any significant cough expectoration shortness of breath or wheezing, patient ran 5 miles today also without any shortness of breath or issues, patient does not have any pleuritic chest pain or back pain now, patient does not have any significant cough expectoration shortness of breath or wheezing, patient does not have any significant abdominal symptoms of concern, patient does not have any increasing leg edema, patient does not have any other pertinent review of system of concern, patient still wanted no about the COVID-19 vaccine boosters, no other issues     Previous HPI: Patient is a 64-year-old female who has come to the office for a pulmonary evaluation and to discuss the test results, patient had developed acute pulmonary bolus him with infarction secondary to receiving J&J Covid vaccine and also traveling to [place] by car and patient has been on oral contraceptive pill for which she was treated, patient continues to be on Eliquis at this time, patient on questioning further states that she has been having some persistent left infrascapular pain for which she has had evaluations done including MRI without any improvement, patient on questioning further states that she does not have any chest pain or discomfort on the right side, patient does not have any increasing cough expectoration shortness of breath or wheezing, patient does not have any significant nasal congestion sinus congestion postnasal drainage, no epistaxis or hemoptysis, patient does not have any sore throat or difficulty in swallowing, patient does not have any significant abdominal pain nausea vomiting or any reflux symptoms, patient states that she does not have any dysuria hematuria or any abnormal bleeding per vagina, patient states that her brother had colon cancer and she has a recent colonoscopy which was negative, patient's Pap smear have been negative, patient also had a breast evaluation done and there was a question of breast lump and biopsy was done which showed some atypia but otherwise was negative, patient does not have any significant change in the ambient environment or sick contacts, no change in weight or appetite, patient has had issues with low blood pressure after she have a started on Eliquis, patient does not have any significant family history of hypercoagulable state, patient does not have any history of collagen vascular issues, no other pertinent review of system of concern              Review of Systems same as above     Physical Exam:  Blood pressure 133/66, pulse 78, temperature 97.6 °F (36.4 °C), temperature source Temporal, resp. rate 18, height 5' 8.5\" (1.74 m), weight 158 lb (71.7 kg), last menstrual period 11/11/2021, SpO2 100 %, not currently breastfeeding.'  Constitutional:  No acute distress. HENT:  Oropharynx is clear and moist. Nothyromegaly. Eyes: Right conjunctival hemorrhaging. Pupils equal, round, and reactive to light. No scleral icterus. Neck: . No tracheal deviation present. No obviousthyroid mass. Cardiovascular: Normal rate, regular rhythm, normal heart sounds. No right ventricular heave. No lower extremity edema. Pulmonary/Chest: No wheezes. No rales. Chest wall is not dull to percussion. No accessory muscle usage or stridor. Abdominal: Soft. Bowel sounds present. No distension or hernia. No tenderness. Musculoskeletal: No cyanosis. No clubbing. No obvious joint deformity. Lymphadenopathy: No cervical or supraclavicularadenopathy. Skin: Skin is warm and dry. No rash or nodules on the exposed extremities. Psychiatric: Normal mood and affect. Behavior is normal.  No anxiety. Neurologic : Alert, awake and oriented. PERRL.   [employer]          Data: Imaging:  I have reviewed radiology images personally. No orders to display     CTA OF THE CHEST 7/16/2021 2:01 pm       TECHNIQUE:   CTA of the chest was performed after the administration of intravenous   contrast.  Multiplanar reformatted images are provided for review.  MIP   images are provided for review. Dose modulation, iterative reconstruction,   and/or weight based adjustment of the mA/kV was utilized to reduce the   radiation dose to as low as reasonably achievable.       COMPARISON:   05/25/2021, 04/20/2021, 03/01/2016       HISTORY:   ORDERING SYSTEM PROVIDED HISTORY: SOB (shortness of breath)   TECHNOLOGIST PROVIDED HISTORY:   Reason for exam:->hx of pulmonary emboli, ongoing SOB, chest pain   Reason for Exam: still having breathing issues and pain on left side in the   back and under left armpit area,  history of blood clot on rt side in April,   Acuity: Acute   Type of Exam: Initial   Relevant Medical/Surgical History: see epic       FINDINGS:   Pulmonary Arteries: Pulmonary arteries are adequately opacified for   evaluation.  No evidence of intraluminal filling defect to suggest pulmonary   embolism.  Main pulmonary artery is normal in caliber.       Mediastinum: There is no pathologic lymphadenopathy within the chest or   mediastinum.  The intrathoracic aorta is unremarkable, without evidence of   aneurysm or dissection.  No pericardial abnormality is identified.       Lungs/pleura:  The central airways are patent.  There is mild dependent   atelectasis within both lower lobes. Tricia Loll has been interval decrease in   size of a nodular focus of opacity within the posterior basal segment of the   right lower lobe, most consistent with an evolving pulmonary infarct.  No   focus of acute airspace consolidation is identified. Tricia Loll is no evidence of   a pneumothorax or pleural effusion. Tricia Loll are a few stable subcentimeter   nodules throughout both lungs, the largest measuring approximately 3.5 mm within the anterior right lower lobe, unchanged from 03/01/2016.  No new   suspicious pulmonary nodule or mass is evident.       Upper Abdomen: Limited images of the upper abdomen are unremarkable.       Soft Tissues/Bones: There is mild degenerative change throughout the thoracic   spine.  No osteolytic or osteoblastic lesion is seen.           Impression   1. No CT evidence of a pulmonary embolism. 2. Evolving right lower lobe pulmonary infarct. MRI OF THE THORACIC SPINE WITHOUT CONTRAST  8/19/2021 11:58 am       TECHNIQUE:   Multiplanar multisequence MRI of the thoracic spine was performed without the   administration of intravenous contrast.       COMPARISON:   None.       HISTORY:   ORDERING SYSTEM PROVIDED HISTORY: DDD (degenerative disc disease), thoracic   TECHNOLOGIST PROVIDED HISTORY:   Is the patient pregnant?->No   Reason for Exam: upper back pain, DDD   Acuity: Acute   Type of Exam: Initial       FINDINGS:   BONES/ALIGNMENT: There is normal alignment of the spine. The vertebral body   heights are maintained.  The bone marrow signal appears unremarkable.       SPINAL CORD: No abnormal cord signal is seen.       SOFT TISSUES: No paraspinal mass identified.       DEGENERATIVE CHANGES: No significant spinal canal stenosis or neural   foraminal narrowing of the thoracic spine.           Impression   Unremarkable MRI of the thoracic spine         BILATERAL DIGITAL SCREENING MAMMOGRAM WITH CAD, DIGITAL BREAST TOMOSYNTHESIS       COMPARISON: None       Breast complaints: None   Lifetime risk: 16.8%       FINDINGS:       Breast stroma is heterogeneously dense, which can obscure small nodules.  Mammography is less sensitive for the detection of carcinoma in dense breasts.       RIGHT BREAST:   Biopsy clip noted inferior outer quadrant 5.8 cm posterior the nipple, stable.       Clustered grouping of microcalcifications in the right breast, along the equator in the inner quadrant, 3:00 position, new from prior study. Although these could represent benign fibroadenoma, biopsy is recommended.       Axilla: No lesions identified. Microclip is noted in the right axilla.       SKIN: Normal       LEFT BREAST: Unremarkable. No direct or indirect signs of malignancy. No interval changes. Axilla: Normal       SKIN: Normal       Digital tomosynthesis was utilized during interpretation.           Impression       Indeterminate right breast calcifications in the 3:00 position, new from prior study. Stereotactic breast biopsy recommended       NUCLEAR MEDICINE VENTILATION PERFUSION SCAN. 11/23/2021       TECHNIQUE:   78.7 millicuries of [id number] was administered via mask prior to planar   imaging of the lungs in anterior and posterior projections.  Then, 5.8   millicuries of Tc 98C MAA was administered intravenously prior to planar   imaging of the lungs in multiple projections.       COMPARISON:   Chest radiograph 06/29/2021.       HISTORY:   ORDERING SYSTEM PROVIDED HISTORY: Pulmonary embolism and infarction [facility])   TECHNOLOGIST PROVIDED HISTORY:   Is the patient pregnant?->No       FINDINGS:   VENTILATION:       Xenon distributes to the lungs bilaterally.  Mildly diminished activity is   seen within portions of the upper, mid, and lower lung zones.  Mild bilateral   xenon retention on washout imaging.       PERFUSION:       Diffuse heterogeneity of perfusion is seen.  Regions of decreased activity   within the upper, mid, and lower lung zones largely matched to the   ventilation pattern.       CHEST RADIOGRAPH:       Attenuation by soft tissue in the lower hemithoraces bilaterally.  No   confluent airspace disease.  No significant pleural effusion.  No   pneumothorax.  Cardiac and mediastinal silhouettes are similar to prior.           Impression   Low probability for pulmonary embolus.       Mild obstructive lung disease.       No acute airspace disease.             Assessment:    1.  Pulmonary embolism and infarction [facility])              Plan:   · Patient's review of system were discussed  · Patient was told about the clinical finding including auscultation and implications and patient does not have any adventitious sounds on auscultation of concern  · Patient's hypercoagulable work-up was so far negative  · Patient appears to have a provoked pulmonary embolism with infarction which may have contributed to her symptomatology initially but patient's symptoms are out of proportion to the imaging which has been done so far  · ID speaking patient needs to take education for 6 months for provoked event but patient has been skeptical about discontinuation  · Patient was told about the VQ scan and patient does not have any chronic thromboembolic phenomena or any other acute phenomena which is new at this time which is concerning  · Patient does not have any pleuritic chest pain at any more  · Patient was told that on the basis of their data available patient can safely discontinue Eliquis  · If patient has any symptoms of concern which were discussed then patient may need to be reevaluated and if for any reason patient gets thromboembolic phenomena once again that patient may require lifelong anticoagulation  · Patient was asking about getting a booster dose for COVID-19 as she had 1 shot of J&J-patient was told about that her current status is not a contraindication and patient was told that she can take any of the mRNA vaccines available for that  · Patient to follow-up on whenever necessary basis

## 2021-12-07 NOTE — PROGRESS NOTES
MA Communication: The following orders are received by verbal communication from Jyoti Alonso MD    Orders include:     Follow up as needed

## 2022-01-09 ENCOUNTER — PATIENT MESSAGE (OUTPATIENT)
Dept: FAMILY MEDICINE CLINIC | Age: 52
End: 2022-01-09
Payer: OTHER GOVERNMENT

## 2022-01-09 ENCOUNTER — E-VISIT (OUTPATIENT)
Dept: PRIMARY CARE CLINIC | Age: 52
End: 2022-01-09
Payer: OTHER GOVERNMENT

## 2022-01-09 DIAGNOSIS — J06.9 VIRAL URI: Primary | ICD-10-CM

## 2022-01-09 DIAGNOSIS — J06.9 UPPER RESPIRATORY TRACT INFECTION, UNSPECIFIED TYPE: Primary | ICD-10-CM

## 2022-01-09 DIAGNOSIS — Z20.822 EXPOSURE TO COVID-19 VIRUS: ICD-10-CM

## 2022-01-09 PROCEDURE — 99422 OL DIG E/M SVC 11-20 MIN: CPT | Performed by: NURSE PRACTITIONER

## 2022-01-09 PROCEDURE — 87804 INFLUENZA ASSAY W/OPTIC: CPT | Performed by: NURSE PRACTITIONER

## 2022-01-09 RX ORDER — AZITHROMYCIN 250 MG/1
TABLET, FILM COATED ORAL
Qty: 6 TABLET | Refills: 0 | Status: SHIPPED | OUTPATIENT
Start: 2022-01-09 | End: 2022-01-19

## 2022-01-09 ASSESSMENT — LIFESTYLE VARIABLES: SMOKING_STATUS: NO, I HAVE NEVER SMOKED

## 2022-01-09 NOTE — PROGRESS NOTES
Reviewed questionnaire    Reviewed meds/allergies    Dx URI/Exposure to covid    Plan  Rx given for zpack, follow up with PCP if no improvement in symptoms with use of antibiotic. Will place orders for covid testing if she would like.      Time spent on visit 11 min

## 2022-01-09 NOTE — TELEPHONE ENCOUNTER
From: Jorgeella Door  Sent: 1/9/2022 12:20 PM EST  To: Chipx John  Practice Support  Subject: Caring for COVID at home    [facility]\"" [address], I completed the evisit and the  called me in an rx for a zpak. She said if I wanted to be tested, I could schedule an appnt. I advised her you had graciously scheduled me for one tomorrow at 2. Thanks again for all of your help! I will see you tomorrow at 2:00. Hope your day is not too crazy and this illness goes away soon.  All the best! Lew Catalan

## 2022-01-10 ENCOUNTER — NURSE ONLY (OUTPATIENT)
Dept: FAMILY MEDICINE CLINIC | Age: 52
End: 2022-01-10

## 2022-01-10 DIAGNOSIS — J06.9 VIRAL URI: ICD-10-CM

## 2022-01-10 DIAGNOSIS — J06.9 VIRAL URI: Primary | ICD-10-CM

## 2022-01-10 LAB
INFLUENZA A ANTIBODY: POSITIVE
INFLUENZA B ANTIBODY: NORMAL

## 2022-01-11 LAB — SARS-COV-2: DETECTED

## 2022-01-14 ENCOUNTER — HOSPITAL ENCOUNTER (OUTPATIENT)
Dept: CT IMAGING | Age: 52
Discharge: HOME | End: 2022-01-14
Payer: OTHER GOVERNMENT

## 2022-01-14 ENCOUNTER — TELEPHONE (OUTPATIENT)
Dept: FAMILY MEDICINE CLINIC | Age: 52
End: 2022-01-14

## 2022-01-14 ENCOUNTER — PATIENT MESSAGE (OUTPATIENT)
Dept: FAMILY MEDICINE CLINIC | Age: 52
End: 2022-01-14

## 2022-01-14 ENCOUNTER — VIRTUAL VISIT (OUTPATIENT)
Dept: FAMILY MEDICINE CLINIC | Age: 52
End: 2022-01-14
Payer: OTHER GOVERNMENT

## 2022-01-14 DIAGNOSIS — Z86.711 HISTORY OF PULMONARY EMBOLISM: ICD-10-CM

## 2022-01-14 DIAGNOSIS — J10.1 INFLUENZA A: ICD-10-CM

## 2022-01-14 DIAGNOSIS — U07.1 COVID-19: Primary | ICD-10-CM

## 2022-01-14 DIAGNOSIS — M54.9 MID BACK PAIN ON RIGHT SIDE: ICD-10-CM

## 2022-01-14 DIAGNOSIS — U07.1 COVID-19: ICD-10-CM

## 2022-01-14 LAB
GFR AFRICAN AMERICAN: >60
GFR NON-AFRICAN AMERICAN: >60
PERFORMED ON: NORMAL
POC CREATININE: 0.6 MG/DL (ref 0.6–1.1)
POC SAMPLE TYPE: NORMAL

## 2022-01-14 PROCEDURE — 71260 CT THORAX DX C+: CPT

## 2022-01-14 PROCEDURE — 6360000004 HC RX CONTRAST MEDICATION: Performed by: NURSE PRACTITIONER

## 2022-01-14 PROCEDURE — 82565 ASSAY OF CREATININE: CPT

## 2022-01-14 PROCEDURE — 99213 OFFICE O/P EST LOW 20 MIN: CPT | Performed by: NURSE PRACTITIONER

## 2022-01-14 RX ADMIN — IOPAMIDOL 75 ML: 755 INJECTION, SOLUTION INTRAVENOUS at 11:13

## 2022-01-14 SDOH — ECONOMIC STABILITY: HOUSING INSECURITY
IN THE LAST 12 MONTHS, WAS THERE A TIME WHEN YOU DID NOT HAVE A STEADY PLACE TO SLEEP OR SLEPT IN A SHELTER (INCLUDING NOW)?: NO

## 2022-01-14 SDOH — ECONOMIC STABILITY: HOUSING INSECURITY: IN THE LAST 12 MONTHS, HOW MANY PLACES HAVE YOU LIVED?: 1

## 2022-01-14 SDOH — ECONOMIC STABILITY: INCOME INSECURITY: IN THE LAST 12 MONTHS, WAS THERE A TIME WHEN YOU WERE NOT ABLE TO PAY THE MORTGAGE OR RENT ON TIME?: NO

## 2022-01-14 SDOH — ECONOMIC STABILITY: FOOD INSECURITY: WITHIN THE PAST 12 MONTHS, THE FOOD YOU BOUGHT JUST DIDN'T LAST AND YOU DIDN'T HAVE MONEY TO GET MORE.: NEVER TRUE

## 2022-01-14 SDOH — ECONOMIC STABILITY: FOOD INSECURITY: WITHIN THE PAST 12 MONTHS, YOU WORRIED THAT YOUR FOOD WOULD RUN OUT BEFORE YOU GOT MONEY TO BUY MORE.: NEVER TRUE

## 2022-01-14 ASSESSMENT — ENCOUNTER SYMPTOMS
SHORTNESS OF BREATH: 0
CHOKING: 0
DIARRHEA: 1
WHEEZING: 0
CHEST TIGHTNESS: 0
BACK PAIN: 1
COUGH: 1
APNEA: 0
NAUSEA: 1
ABDOMINAL PAIN: 0
STRIDOR: 0
VOMITING: 0

## 2022-01-14 ASSESSMENT — SOCIAL DETERMINANTS OF HEALTH (SDOH): HOW HARD IS IT FOR YOU TO PAY FOR THE VERY BASICS LIKE FOOD, HOUSING, MEDICAL CARE, AND HEATING?: NOT HARD AT ALL

## 2022-01-14 NOTE — PROGRESS NOTES
2022    TELEHEALTH EVALUATION -- Audio/Visual (During WBTXU-85 public health emergency)    HPI:    Tavo Kendrick (:  1970) has requested an audio/video evaluation for the following concern(s):    Pt tested positive for COVID-19 and influenza A on 1/10/22. She states that she wasn't feeling too bad, but started feeling a lot worse last night. Started to get sharp pains in her right mid back area with chills, nausea, loose stools and feeling feverish. Her main concern is her h/o PE and pneumonia 2021 in her right lung. She started ASA 81 mg daily 2 days ago. Denies SOB, CP or wheezing. Denies recent long distance travel. Has not been on oral BCP's since 2021. Monitoring BP at home 128/77 and HR was 63 bpm.      Review of Systems   Constitutional: Positive for chills, fatigue and fever. Feels feverish   Respiratory: Positive for cough. Negative for apnea, choking, chest tightness, shortness of breath, wheezing and stridor. Cardiovascular: Negative. Gastrointestinal: Positive for diarrhea and nausea. Negative for abdominal pain and vomiting. Musculoskeletal: Positive for back pain. Neurological: Negative. Prior to Visit Medications    Medication Sig Taking? Authorizing Provider   Lactobacillus (PROBIOTIC ACIDOPHILUS PO) Take by mouth Yes Historical Provider, MD   OMEGA 3-6-9 FATTY ACIDS PO Take by mouth Yes Historical Provider, MD   Multiple Vitamins-Minerals (THERAPEUTIC MULTIVITAMIN-MINERALS) tablet Take 1 tablet by mouth daily Yes Historical Provider, MD   vitamin B-12 (CYANOCOBALAMIN) 100 MCG tablet Take 50 mcg by mouth daily Yes Historical Provider, MD   loratadine (CLARITIN) 10 MG tablet Take 10 mg by mouth daily Yes Historical Provider, MD   azithromycin (ZITHROMAX) 250 MG tablet 2 tablets by mouth on day one. Then, 1 tablet by mouth daily for days 2-5.   Patient not taking: Reported on 2022  JOB Valentine - CNP   gabapentin (NEURONTIN) 600 MG tablet TAKE 1 TABLET BY MOUTH THREE TIMES DAILY  Patient not taking: Reported on 1/14/2022  JOB Russell - SARAI       Social History     Tobacco Use    Smoking status: Never Smoker    Smokeless tobacco: Never Used   Vaping Use    Vaping Use: Never used   Substance Use Topics    Alcohol use: No    Drug use: No        Allergies   Allergen Reactions    Codeine Other (See Comments)     REDNESS IN NECK TO FACE    Decongestant [Pseudoephedrine Hcl]      Nausea, increased heart rate    Norco [Hydrocodone-Acetaminophen] Hives    Tramadol Nausea Only and Anxiety   ,   Past Medical History:   Diagnosis Date    Breast atypical lobular hyperplasia     GERD (gastroesophageal reflux disease)     Hypertension     PONV (postoperative nausea and vomiting)     Pulmonary embolism (HCC) 04/20/2021    Seasonal allergies    ,   Past Surgical History:   Procedure Laterality Date    BREAST BIOPSY Right 12/13/2017     TWO SITE RIGHT NEEDLE LOCALIZED EXCISIONAL BREAST BIOPSY    COLONOSCOPY N/A 12/7/2020    COLONOSCOPY DIAGNOSTIC performed by Ambar Wilkins MD at [address] [facility]     ,   Social History     Tobacco Use    Smoking status: Never Smoker    Smokeless tobacco: Never Used   Vaping Use    Vaping Use: Never used   Substance Use Topics    Alcohol use: No    Drug use: No   ,   Family History   Problem Relation Age of Onset    No Known Problems Mother     Heart Disease Father 52        CAD, hx of CABG    Colon Cancer Brother     No Known Problems Brother     Diabetes Paternal Aunt     Diabetes Paternal Uncle    ,   Immunization History   Administered Date(s) Administered    COVID-19, J&J, PF, 0.5 mL 03/12/2021   ,   Health Maintenance   Topic Date Due    Hepatitis C screen  Never done    HIV screen  Never done    DTaP/Tdap/Td vaccine (1 - Tdap) Never done    Cervical cancer screen  Never done    Shingles Vaccine (1 of 2) Never done    COVID-19 Vaccine (2 - Booster for [employer] series) 05/07/2021    Flu vaccine (1) Never done    Depression Screen  04/20/2022    Potassium monitoring  05/25/2022    Creatinine monitoring  11/12/2022    Breast cancer screen  09/16/2023    Lipid screen  08/06/2026    Colon cancer screen colonoscopy  12/07/2030    Hepatitis A vaccine  Aged Out    Hepatitis B vaccine  Aged Out    Hib vaccine  Aged Out    Meningococcal (ACWY) vaccine  Aged Out    Pneumococcal 0-64 years Vaccine  Aged Out       PHYSICAL EXAMINATION:  [ INSTRUCTIONS:  \"[x]\" Indicates a positive item  \"[]\" Indicates a negative item  -- DELETE ALL ITEMS NOT EXAMINED]  Vital Signs: (As obtained by patient/caregiver or practitioner observation)    Blood pressure-  Heart rate-    Respiratory rate-    Temperature-  Pulse oximetry-     Constitutional: [x] Appears well-developed and well-nourished [] No apparent distress      [x] Abnormal-  Appears to not feel well  Mental status  [x] Alert and awake  [x] Oriented to person/place/time [x]Able to follow commands      Eyes:  EOM    []  Normal  [] Abnormal-  Sclera  []  Normal  [] Abnormal -         Discharge []  None visible  [] Abnormal -    HENT:   [] Normocephalic, atraumatic.   [] Abnormal   [] Mouth/Throat: Mucous membranes are moist.     External Ears [] Normal  [] Abnormal-     Neck: [] No visualized mass     Pulmonary/Chest: [x] Respiratory effort normal.  [x] No visualized signs of difficulty breathing or respiratory distress        [] Abnormal-      Musculoskeletal:   [] Normal gait with no signs of ataxia         [] Normal range of motion of neck        [] Abnormal-       Neurological:        [] No Facial Asymmetry (Cranial nerve 7 motor function) (limited exam to video visit)          [] No gaze palsy        [] Abnormal-         Skin:        [] No significant exanthematous lesions or discoloration noted on facial skin         [] Abnormal-            Psychiatric:       [x] Normal Affect [x] No Hallucinations        [] Abnormal-     Other pertinent observable physical exam findings-     ASSESSMENT/PLAN:  1. COVID-19  See HPI. With recent h/o PE and pneumonia, + COVID-19, and right mid back pain, will do STAT chest CT to r/o a PE and pneumonia. Will f/u with pt regarding results and POC.  - CT CHEST PULMONARY EMBOLISM W CONTRAST  - CREATININE, SERUM    2. History of pulmonary embolism  See #1  - CT CHEST PULMONARY EMBOLISM W CONTRAST  - CREATININE, SERUM    3. Mid back pain on right side  See #1  - CT CHEST PULMONARY EMBOLISM W CONTRAST  - CREATININE, SERUM    4. Influenza A  See #1  - CT CHEST PULMONARY EMBOLISM W CONTRAST  - CREATININE, SERUM      Return for TBD. Bobby Sylvester, was evaluated through a synchronous (real-time) audio-video encounter. The patient (or guardian if applicable) is aware that this is a billable service. Verbal consent to proceed has been obtained within the past 12 months. The visit was conducted pursuant to the emergency declaration under the [address] [place] [address] authority and the [employer] and [employer] General Act. Patient identification was verified, and a caregiver was present when appropriate. The patient was located in a state where the provider was credentialed to provide care. Total time spent on this encounter: 20 minutes    --JOB Harrington CNP on 1/14/2022 at 10:09 AM    An electronic signature was used to authenticate this note.

## 2022-01-14 NOTE — TELEPHONE ENCOUNTER
Radiology called with stat CT result-    Impression- No evidence of pulmonary embolism or acute pulmonary abnormality.

## 2022-01-14 NOTE — TELEPHONE ENCOUNTER
Called [employer] Scheduling at [phone] and spoke to Alonso Platt to schedule STAT Ct chest Pulmonary embolism w contrast. They scheduled her today at [facility], and asked patient to arrive as soon as possible, nothing else to eat or drink prior to CT. Called and spoke to patient and made her aware, and she stated she would be on her way.

## 2022-01-15 ENCOUNTER — PATIENT MESSAGE (OUTPATIENT)
Dept: FAMILY MEDICINE CLINIC | Age: 52
End: 2022-01-15

## 2022-01-15 ENCOUNTER — TELEPHONE (OUTPATIENT)
Dept: FAMILY MEDICINE CLINIC | Age: 52
End: 2022-01-15

## 2022-01-15 DIAGNOSIS — Z86.711 HISTORY OF PULMONARY EMBOLISM: Primary | ICD-10-CM

## 2022-01-15 DIAGNOSIS — U07.1 COVID-19: ICD-10-CM

## 2022-01-15 DIAGNOSIS — M79.662 PAIN OF LEFT CALF: Primary | ICD-10-CM

## 2022-01-15 DIAGNOSIS — Z86.711 HISTORY OF PULMONARY EMBOLISM: ICD-10-CM

## 2022-01-15 PROCEDURE — 93970 EXTREMITY STUDY: CPT | Performed by: NURSE PRACTITIONER

## 2022-01-15 NOTE — TELEPHONE ENCOUNTER
From: Basilio Mckeon  To: Tuan Barboza  Sent: 1/15/2022 11:11 AM EST  Subject: Calf pain    Good morning Yee Adams, It is not terrible but this morning I woke up with discomfort in my left calf? It does not look red or swollen but you said to let you know. I definitely notice it, not quite a yolanda horse but maybe like a cramp.  Please advise, Yee Adams

## 2022-01-15 NOTE — TELEPHONE ENCOUNTER
Pt called with information and instructions re activity and prevention. She would feel more comfortable starting Eliquis  Communicated this to PCP.

## 2022-01-15 NOTE — TELEPHONE ENCOUNTER
Gato Hines RN called me to discuss. Ms. Edgardo Ryan has a h/o PE 5/2021, and currently has COVID-19. Her recent chest CT was negative for PE. I have placed an order for LLE ultrasound to r/o DVT. Can you please give the pt the scheduling number to see if they could possibly get her in this weekend? Leno Lua - just an FYI since you are on call this weekend. Jorge Godoy - thank you so much for your help with this pt!

## 2022-01-15 NOTE — TELEPHONE ENCOUNTER
----- Message from Nikki Gonzalez sent at 1/15/2022 11:17 AM EST -----  Subject: Message to Provider    QUESTIONS  Information for Provider? Pt called and stated that she tested positive   for COVID 01/10/2022. Pt states that she has a history of blood clots and   the DrAce told her to let her know if she has any calf pain swelling or   redness to let her know right away. Pt states that she woke up this   morning with cramps in her left calf. Pt wanted the Eben to be aware of   this.   ---------------------------------------------------------------------------  --------------  CALL BACK INFO  What is the best way for the office to contact you? OK to leave message on   voicemail  Preferred Call Back Phone Number? [phone]  ---------------------------------------------------------------------------  --------------  SCRIPT ANSWERS  Relationship to Patient?  Self

## 2022-01-15 NOTE — TELEPHONE ENCOUNTER
Pt calls and LM on this RN's VM  PCP NP has called and advised that d/t not having starter packs the patient can have rx in bottle. Call to pt and advised to follow directions on the bottle. Patient voices understanding.

## 2022-01-17 ENCOUNTER — TELEPHONE (OUTPATIENT)
Dept: FAMILY MEDICINE CLINIC | Age: 52
End: 2022-01-17

## 2022-01-17 ENCOUNTER — HOSPITAL ENCOUNTER (OUTPATIENT)
Dept: VASCULAR LAB | Age: 52
Discharge: HOME | End: 2022-01-17
Payer: OTHER GOVERNMENT

## 2022-01-17 DIAGNOSIS — M79.662 BILATERAL CALF PAIN: Primary | ICD-10-CM

## 2022-01-17 DIAGNOSIS — M79.661 BILATERAL CALF PAIN: ICD-10-CM

## 2022-01-17 DIAGNOSIS — M79.661 BILATERAL CALF PAIN: Primary | ICD-10-CM

## 2022-01-17 DIAGNOSIS — M79.662 BILATERAL CALF PAIN: ICD-10-CM

## 2022-01-17 PROCEDURE — 93970 EXTREMITY STUDY: CPT

## 2022-02-28 ENCOUNTER — PATIENT MESSAGE (OUTPATIENT)
Dept: FAMILY MEDICINE CLINIC | Age: 52
End: 2022-02-28

## 2022-02-28 NOTE — TELEPHONE ENCOUNTER
Your only opening tomorrow is your 4:30/4:45 provider fill. Would it be okay to schedule Jazmin Baxter in this slot for a 30 minute appointment?  For the time being I have her scheduled on Wednesday at 4 pm.

## 2022-02-28 NOTE — TELEPHONE ENCOUNTER
From: Dirk Pride  To: Jie Funes  Sent: 2/28/2022 8:40 AM EST  Subject: Catarino Pickler pain in right side    Good morning Caesar Hope, Me again. .... since Thursday I have been experiencing sharp pains in my right side ( only last a few seconds and maybe twice a day). Nothing hurts when I take a deep breath, but it didnt last time either. I have been taking the tylenol every 6 hours and that has seemed to help ease the other issue. I am little concerned and since I was going to follow up with you anyway, thought I would send a message. Should I just call to make an appnt? Good news is. . suns out today, so that is a postive!

## 2022-03-01 ENCOUNTER — OFFICE VISIT (OUTPATIENT)
Dept: FAMILY MEDICINE CLINIC | Age: 52
End: 2022-03-01
Payer: OTHER GOVERNMENT

## 2022-03-01 ENCOUNTER — HOSPITAL ENCOUNTER (OUTPATIENT)
Dept: GENERAL RADIOLOGY | Age: 52
Discharge: HOME | End: 2022-03-01
Payer: OTHER GOVERNMENT

## 2022-03-01 ENCOUNTER — HOSPITAL ENCOUNTER (OUTPATIENT)
Age: 52
Discharge: HOME | End: 2022-03-01
Payer: OTHER GOVERNMENT

## 2022-03-01 VITALS
BODY MASS INDEX: 23.67 KG/M2 | WEIGHT: 158 LBS | HEART RATE: 85 BPM | OXYGEN SATURATION: 98 % | DIASTOLIC BLOOD PRESSURE: 74 MMHG | SYSTOLIC BLOOD PRESSURE: 112 MMHG

## 2022-03-01 DIAGNOSIS — M54.9 MID BACK PAIN ON RIGHT SIDE: ICD-10-CM

## 2022-03-01 DIAGNOSIS — M54.9 MID BACK PAIN ON RIGHT SIDE: Primary | ICD-10-CM

## 2022-03-01 LAB
ALBUMIN SERPL-MCNC: 5 G/DL (ref 3.4–5)
ALP BLD-CCNC: 58 U/L (ref 40–129)
ALT SERPL-CCNC: 10 U/L (ref 10–40)
AST SERPL-CCNC: 19 U/L (ref 15–37)
BILIRUB SERPL-MCNC: 0.5 MG/DL (ref 0–1)
BILIRUBIN DIRECT: <0.2 MG/DL (ref 0–0.3)
BILIRUBIN, INDIRECT: NORMAL MG/DL (ref 0–1)
D DIMER: <200 NG/ML DDU (ref 0–229)
TOTAL PROTEIN: 7.4 G/DL (ref 6.4–8.2)

## 2022-03-01 PROCEDURE — 71046 X-RAY EXAM CHEST 2 VIEWS: CPT

## 2022-03-01 PROCEDURE — 36415 COLL VENOUS BLD VENIPUNCTURE: CPT | Performed by: NURSE PRACTITIONER

## 2022-03-01 PROCEDURE — 99214 OFFICE O/P EST MOD 30 MIN: CPT | Performed by: NURSE PRACTITIONER

## 2022-03-01 RX ORDER — ACETAMINOPHEN 500 MG
500 TABLET ORAL EVERY 6 HOURS PRN
COMMUNITY

## 2022-03-01 ASSESSMENT — ENCOUNTER SYMPTOMS
COUGH: 0
APNEA: 0
BACK PAIN: 1
SHORTNESS OF BREATH: 0
WHEEZING: 0
CHEST TIGHTNESS: 0
STRIDOR: 0
CHOKING: 0

## 2022-03-01 NOTE — PROGRESS NOTES
3/1/2022     Rico Carreno (:  1970) is a 49 y.o. female, here for evaluation of the following medical concerns:    HPI  Pt c/o random sharp pains to her right mid back area for the past 6 days. Denies pain with movement or touch. Denies trouble breathing, CP or SOB. She states that since she had a PE in 2021 she has had a constant feeling of inflammation in her entire back. States that she has been taking extra strength tylenol about every 6 hours for the past 3 weeks. Thoracic spine MRI 2021 - normal.    Was following pulmonology. NM Lung vent/perfusion (VQ) 2021:  Impression   Low probability for pulmonary embolus.       Mild obstructive lung disease.       No acute airspace disease. CT Chest 2022:   Normal.    Had right biopsy done 2021. Following Dr. Lainey Cruz - has f/u with her this month. H/o pleurisy and pneumonia. States that she can \"feel something\" in the area where the pains are when she breathes in deeply. Review of Systems   Constitutional: Negative. HENT: Negative. Respiratory: Negative for apnea, cough, choking, chest tightness, shortness of breath, wheezing and stridor. See HPI   Cardiovascular: Negative. Musculoskeletal: Positive for back pain. See HPI   Skin: Negative. Neurological: Negative. Hematological: Negative for adenopathy. Prior to Visit Medications    Medication Sig Taking?  Authorizing Provider   acetaminophen (TYLENOL) 500 MG tablet Take 500 mg by mouth every 6 hours as needed for Pain Yes Historical Provider, MD   Lactobacillus (PROBIOTIC ACIDOPHILUS PO) Take by mouth Yes Historical Provider, MD   OMEGA 3-6-9 FATTY ACIDS PO Take by mouth Yes Historical Provider, MD   Multiple Vitamins-Minerals (THERAPEUTIC MULTIVITAMIN-MINERALS) tablet Take 1 tablet by mouth daily Yes Historical Provider, MD   vitamin B-12 (CYANOCOBALAMIN) 100 MCG tablet Take 50 mcg by mouth daily Yes Historical Provider, MD   loratadine (CLARITIN) 10 MG tablet Take 10 mg by mouth daily Yes Historical Provider, MD        Social History     Tobacco Use    Smoking status: Never Smoker    Smokeless tobacco: Never Used   Substance Use Topics    Alcohol use: No        Vitals:    03/01/22 1630   BP: 112/74   Site: Left Upper Arm   Position: Sitting   Cuff Size: Large Adult   Pulse: 85   SpO2: 98%   Weight: 158 lb (71.7 kg)     Estimated body mass index is 23.67 kg/m² as calculated from the following:    Height as of 12/7/21: 5' 8.5\" (1.74 m). Weight as of this encounter: 158 lb (71.7 kg). Physical Exam  Vitals reviewed. Constitutional:       General: She is not in acute distress. Appearance: Normal appearance. She is normal weight. She is not ill-appearing, toxic-appearing or diaphoretic. HENT:      Head: Normocephalic. Cardiovascular:      Rate and Rhythm: Normal rate and regular rhythm. Pulses: Normal pulses. Heart sounds: Normal heart sounds. Pulmonary:      Effort: Pulmonary effort is normal.      Breath sounds: Normal breath sounds. Chest:   Breasts:      Right: No axillary adenopathy. Left: No axillary adenopathy. Musculoskeletal:         General: Normal range of motion. Lymphadenopathy:      Upper Body:      Right upper body: No axillary adenopathy. Left upper body: No axillary adenopathy. Skin:     General: Skin is warm and dry. Neurological:      Mental Status: She is alert and oriented to person, place, and time. Psychiatric:         Mood and Affect: Mood normal.         Behavior: Behavior normal.         Thought Content: Thought content normal.         Judgment: Judgment normal.         ASSESSMENT/PLAN:  1. Mid back pain on right side  See HPI. Will check D-dimer, if positive will send for chest CT to r/o PE. Pt admits to heavy use of Tylenol - will check liver enzymes. Recommended that the pt try Ibuprofen instead for the for pain.     H/o pneumonia and pleurisy - will check chest xray. Pt has had recent evaluations with cardiology, pulmonology and ortho for similar symptoms. Ortho recommended PT, steroids and possible injections (8/26/2021) - may need to have the pt f/u with them if everything else is negative. She also had a right breast biopsy 11/2021 - scheduled f/u with Dr. Neelam Hanley this month, will need repeat mammogram.      - XR CHEST (2 VW); Future  - Hepatic Function Panel  - D-Dimer, Quantitative      Return if symptoms worsen or fail to improve. An electronic signature was used to authenticate this note.     40 min spent with pt    --JOB Steinberg - CNP on 3/1/2022 at 5:37 PM

## 2022-03-03 DIAGNOSIS — M54.6 ACUTE MIDLINE THORACIC BACK PAIN: ICD-10-CM

## 2022-03-03 DIAGNOSIS — Z86.711 HISTORY OF PULMONARY EMBOLISM: Primary | ICD-10-CM

## 2022-06-09 ENCOUNTER — HOSPITAL ENCOUNTER (OUTPATIENT)
Dept: MAMMOGRAPHY | Age: 52
Discharge: HOME | End: 2022-06-09
Payer: OTHER GOVERNMENT

## 2022-06-09 VITALS — BODY MASS INDEX: 23.4 KG/M2 | WEIGHT: 158 LBS | HEIGHT: 69 IN

## 2022-06-09 DIAGNOSIS — R92.8 ABNORMAL MAMMOGRAM: ICD-10-CM

## 2022-06-09 PROCEDURE — G0279 TOMOSYNTHESIS, MAMMO: HCPCS

## 2022-06-22 DIAGNOSIS — F40.298 FEAR OF TESTS: Primary | ICD-10-CM

## 2022-06-22 RX ORDER — DIAZEPAM 10 MG/1
TABLET ORAL
Qty: 1 TABLET | Refills: 0 | Status: SHIPPED | OUTPATIENT
Start: 2022-06-22 | End: 2022-07-03

## 2022-06-27 ENCOUNTER — HOSPITAL ENCOUNTER (OUTPATIENT)
Dept: MRI IMAGING | Age: 52
Discharge: HOME | End: 2022-06-27
Payer: OTHER GOVERNMENT

## 2022-06-27 DIAGNOSIS — N60.91 UNSPECIFIED BENIGN MAMMARY DYSPLASIA OF RIGHT BREAST: ICD-10-CM

## 2022-06-27 PROCEDURE — C8908 MRI W/O FOL W/CONT, BREAST,: HCPCS

## 2022-06-27 PROCEDURE — 6360000004 HC RX CONTRAST MEDICATION: Performed by: SURGERY

## 2022-06-27 PROCEDURE — A9579 GAD-BASE MR CONTRAST NOS,1ML: HCPCS | Performed by: SURGERY

## 2022-06-27 RX ADMIN — GADOTERIDOL 13 ML: 279.3 INJECTION, SOLUTION INTRAVENOUS at 12:40

## 2022-06-30 ENCOUNTER — HOSPITAL ENCOUNTER (OUTPATIENT)
Dept: PHYSICAL THERAPY | Age: 52
Setting detail: THERAPIES SERIES
Discharge: HOME | End: 2022-06-30
Payer: OTHER GOVERNMENT

## 2022-06-30 PROCEDURE — 97110 THERAPEUTIC EXERCISES: CPT

## 2022-06-30 PROCEDURE — 97140 MANUAL THERAPY 1/> REGIONS: CPT

## 2022-06-30 PROCEDURE — 97161 PT EVAL LOW COMPLEX 20 MIN: CPT

## 2022-06-30 NOTE — FLOWSHEET NOTE
Physical Therapy Daily Treatment Note    [x]Daily Tx Note    []Progress Note    [] Discharge Summary         Date:  2022    Patient Name:  Milind Ko    :  1970  MRN: [id number]      Medical/Treatment Diagnosis Information:  · Diagnosis: R07.89 Bilateral chest wall pain localized to anterior latissimus dorsi bilaterally  · Treatment Diagnosis: Thoracic pain    Insurance/Certification information:  PT Insurance Information: GEHA- no authorization    Physician Information:  Referring Provider (secondary): Carmelo Liang MD      Plan of care sent to provider:      [x]Faxed   []Co-signature    (attempts: 1[x] 22 2 []3[])     Plan of care signed :  []  Yes  [x] No      Date of Patient follow up with Physician:     Is this a Progress Report:     []  Yes  [x]  No      If Yes:  Date Range for reporting period:  Beginning 2022  Ending    Progress report will be due (10 Rx or 30 days whichever is less):       Recertification will be due (POC Duration  / 90 days whichever is less):       Visit # Insurance Allowable Auth Required      []  Yes [x]  No        Functional Scale:  FOTO    Date    2022  Score   59  Predicted Score: 71  Predicted Visits:  10    Latex Allergy:  [x]NO      []YES  Preferred Language for Healthcare:   [x]English       []other:    RESTRICTIONS/PRECAUTIONS:  previous PE in 2021. Pt reports that she has recently lost weight with 15 lbs in last 3-4 months. 2017 pt was Dx with right breast atypical ductal hyperplasia. SUBJECTIVE:  See eval    Pain level:  Patient reports pain is  3-4/10 pain at present and  4/10 pain at its worst.      Plan Moving Forward/ For next visit:   ·  Manual techniques for rib dysfunction and asymmetrical alignment. · Exercise as able for stretch and strengthening    OBJECTIVE: See eval    Exercises/Interventions:     Exercises in bold performed in department today.   Items not bolded are carried forward from prior visits for continuity of the record. Exercise/Equipment Resistance/Repetitions HEP Other comments       []        []        []        []        []        []        []        []        []          []         []        []        []        []        []        []        []        []      Therapeutic Exercise/Home Exercise Program:   15 minutes  Education on anatomy, condition and treatment    Therapeutic Activity:  0 minutes     Gait: 0 minutes    Neuromuscular Re-Education:  0 minutes      Canalith Repositioning Procedure:      Manual Therapy:  10 minutes  Manual treatment: MET of mid thoracic region in sitting and MET for right rib torsion in sidelying. Modalities: 0 minutes    ASSESSMENT:  Pt demonstrates thoracic pain with UE motion as well as possible rib dysfunction. Goals:   GOALS    Short Term Goals:  3   weeks Long Term Goals:   6  weeks   1). Establish HEP 1). Pt independent with HEP   2). Pain  3/10 or less at worst 2). Pain  1/10 or less at worst   3). Pt able to maintain arms above head with minimal to no noted difficulty. 3). Increase B flex and abd to 175 with no note discomfort      4). 4). Pt is able to pull self up from pool with no noted difficulty. 5). 5). Pt demonstrates minimal to no noted tenderness with palpation. 6). 6).          Overall Progression Towards Functional goals/ Treatment Progress Update:  [] Patient is progressing as expected towards functional goals listed. [] Progression is slowed due to complexities/Impairments listed. [] Progression has been slowed due to co-morbidities.   [x] Plan just implemented, too soon to assess goals progression <30days   [] Goals require adjustment due to lack of progress  [] Patient is not progressing as expected and requires additional follow up with physician  [] Other    Prognosis for POC: [x] Good [] Fair  [] Poor    Patient requires continued skilled intervention: [x] Yes  [] No    Treatment/Activity Tolerance:  [x] Patient able to complete treatment  [] Patient limited by fatigue  [] Patient limited by pain    [] Patient limited by other medical complications  [] Other:         PLAN: See eval  [] Continue per plan of care [] Alter current plan (see comments above)  [x] Plan of care initiated [] Hold pending MD visit [] Discharge        Therapeutic Exercise and NMR EXR  [x] (80438) Provided verbal/tactile cueing for activities related to strengthening, flexibility, endurance, ROM  for improvements in proximal strength and core control with self care, mobility, lifting and ambulation.  [] (25975) Provided verbal/tactile cueing for activities related to improving balance, coordination, kinesthetic sense, posture, motor skill, proprioception  to assist with core control in self care, mobility, lifting, and ambulation.      Therapeutic Activities and Gait:    [] (47826 or 86719) Provided verbal/tactile cueing for activities related to improving balance, coordination, kinesthetic sense, posture, motor skill, proprioception and motor activation to allow for proper function  with self care and ADLs  [] (11619) Provided training and instruction to the patient for proper core and proximal hip recruitment and positioning with ambulation re-education     Home Exercise Program:    [x] (63032) Reviewed/Progressed HEP activities related to strengthening, flexibility, endurance, ROM of core, proximal hip and LE for functional self-care, mobility, lifting and ambulation   [] (22454) Reviewed/Progressed HEP activities related to improving balance, coordination, kinesthetic sense, posture, motor skill, proprioception of core, proximal hip and LE for self care, mobility, lifting, and ambulation      Manual Treatments:  PROM / STM / Oscillations-Mobs:  G-I, II, III, IV (PA's, Inf., Post.)  [x] (09563) Provided manual therapy to mobilize proximal hip and LS spine soft tissue/joints for the purpose of modulating pain, promoting relaxation, increasing ROM, reducing/eliminating soft tissue swelling/inflammation/restriction, improving soft tissue extensibility and allowing for proper ROM for normal function with self care, mobility, lifting and ambulation. []CRP:  Canalith Repositioning procedure for the assessment, treatment and education of BPPV    Modalities:       Charges:  Timed Code Treatment Minutes: 25   Total Treatment Minutes: 46     Medicare Cap total YTD:        [x]N/A  Workers Comp Time Stamp  (Per CPT and Total Treatment) [x]N/A   Time In:   Time Out:       [x] EVAL    [] Dry Needling  [x] ME(19690)   x 1    [] EStim Unattended 02714  [] NMR (41465)  x     [] Estim Attended  70849  [x] Manual (13723)  x 1     [] Mechanical Txn 47852  [] TA    x     [] Ultrasound  [] Gait   x  [] Vaso  [] CRP    [] Ionto           [] Other:        Electronically signed by: JILLIAN Delacruz,[id number]      Note: If patient does not return for scheduled/ recommended follow up visits, this note will serve as a discharge from care along with most recent update on progress.

## 2022-06-30 NOTE — PROGRESS NOTES
[facility]  [address] [place] [address], DEEPIKA Pascual  Phone: [phone]      Fax:   [phone]        Dear Dr. Katerina Tinoco MD,    We had the pleasure of evaluating the following patient for physical therapy services at [address] [place] [address]. A summary of our findings can be found in the initial assessment below. This includes our plan of care. If you have any questions or concerns regarding these findings, please do not hesitate to contact me at the office phone number above. Thank you for this referral.       Physician/Provider Signature:_______________________________Date:__________________  By signing above (or electronic signature), therapist's plan is approved by physician          Patient: Crystal Goyal     : 1970     MRN: [id number]             Evaluation Date: 2022        Medical Diagnosis Information:  Diagnosis: R07.89 Bilateral chest wall pain localized to anterior latissimus dorsi bilaterally   Treatment Diagnosis: Thoracic pain                                           Insurance information: PT Insurance Information: GEHA- no authorization     Precautions/ Contra-indications: previous PE in 2021. Pt reports that she has recently lost weight with 15 lbs in last 3-4 months. 2017 pt was Dx with right breast atypical ductal hyperplasia. Latex Allergy:  [x]NO      []YES      Preferred Language for Healthcare:   [x]English       []other:  C-SSRS Triggered by Intake questionnaire (Past 2 wk assessment):   [x] No, Questionnaire did not trigger screening.   [] Yes, Patient intake triggered further evaluation      [] C-SSRS Screening completed  [] PCP notified via Plan of Care  [] Emergency services notified    SUBJECTIVE FINDINGS        History of Present Illness:     Pt reports the she had PE in lung in . She also had pneumonia and pleurisy.  Pt was put on Eliquis from 4/21 to 3/22. Pt then had COVID in January 2022. Pt presents with C/o pain in thoracic region more right than left. Pt reports symptoms began in 4/21 but have recently been exacerbated when pull to get out of pool 2 weeks ago. Pain       Patient describes pain to be pressure   Patient reports pain is  3-4/10 pain at present and  4/10 pain at its worst.  Worsened by sleeping, reaching and putting on jacket   Improved by did not indicate     Current Functional Limitations:   [x]   Yes   []   No  Functional Complaints:  Sleeping, reaching and putting on jacket. PLOF:   [x]   No functional limitations   []   Pt with previous functional limitations  Comment:  Pt's sleep is affected?    [x]   Yes   []   No    Chemotherapy:  [] Yes [x] No [] Current  [] Completed Date:_________    Radiation:  [] Yes [x] No [] Current  [] Completed Date:_________        Relevant Medical History:    Functional Scale/Score:    FOTO Scale   Date     6/30/2022   Intake Score:    59   Predicted Score:    71  Predicted Number of Visits 10    Occupation/School: pt works in finance     Sport/recreational activities: pt works out regularly    Patient goal for therapy:  \"Eliminate persistent back pain and identify cause\"    OBJECTIVE FINDINGS:  Vitals: Blood pressure______  O2 sat______  Pulse______ Not formally assessed    Pitting  N/A  Color  N/A  Skin Texture  N/A  Skin Temperature  N/A  Edema Rebound*:  N/A  *pressure applied x10 seconds    Signs of Constriction:  Condition of Nailbeds N/A     Skin Breakdown (indicate size & number-also note location on body drawings): none  Tinea (fungus): none  Papilloma-- benign tumor arising from an epithelial layer: none  Fibrotic areas: none  Warts-- a local growth of the outer layer of skin: none  Ulcers: none  SCARS: none  STEMMER SIGN; (positive/negative)    Stage of Lymphedema (Golematis)  Mild:  (less than 2 cm difference  Moderate (2-4 cm difference  Moderate/Severe ( 4-5cm difference  Severe (greater than 5 cm difference)    Definitions:  Papilloma=benign tumor arising from an epithelial layer  Wart=a local growth of the outer layer of skin  Brawny=does not indent    UE Range of motion AROM: shoulder flex R 165 discomfort on right scapular region/thoracic on right, L163; abd R 167discomfort on right scapular region/thoracic on right, L178; ER B T3; IR B mid thoracic    Strength UE: bicep and tricep weakness more right 4+/5 when performed unilaterally; when performed bilateral 3+ to 4-/5; B shoulder abduction and flexion 4+ to 5/5 in available range. tricep (C7) overhead 4+ to 5/5 pt did have pain/discomfort in this position just maintaining. AROM cervical and thoracic region: [facility] to WN through; however, noted left cervical rotation substitution noted with sidebend and rotation    []  WNL  [] WFL      LE Range of motion_____________________________________________________    [x]  WNL  [] WFL     GIRTH MEASUREMENT FLOW SHEET  Not assessed secondary to pt not having lymph nodes removed and no noted swelling. Alignment thoracic spine: rotation noted to left in sitting. Palpation: tenderness noted 4-5th rib region more right than left with torsion noted. ASSESSMENT: Pt demonstrates thoracic pain with UE motion as well as possible rib dysfunction. Problems          []   Decreased cervical ROM  [x]   Decreased UE ROM  [x]   Increased pain  [x]   Decreased flexibility  []   Abnormality of gait  []   Increased swelling  [x]   Poor posture/alignment  [x]   Decreased functional status     Rehabilitation Potential:  Good for goals listed below.     Strengths for achieving goals include:  [x]   Pt motivated   []   PLOF   []   Family support   Limitations for achieving goals include:  []   None   [x]   Severity of condition     []   Cognitive   []   Lack of family support   []   Lack of resources  []   Co-morbidities     []   Other:         Prognosis:   [x]   Good   []   Fair []   Poor    GOALS    Short Term Goals:  3   weeks Long Term Goals:   6  weeks   1). Establish HEP 1). Pt independent with HEP   2). Pain  3/10 or less at worst 2). Pain  1/10 or less at worst   3). Pt able to maintain arms above head with minimal to no noted difficulty. 3). Increase B flex and abd to 175 with no note discomfort      4). 4). Pt is able to pull self up from pool with no noted difficulty. 5). 5). Pt demonstrates minimal to no noted tenderness with palpation. 6). 6). PLAN OF CARE     To see patient 2 x/week for 6 weeks for the following treatment interventions:  Discussed with pt that if she does not get relief in 4-5 session of PT that she is to return to MD or follow up with PCP secondary to increased recent weight loss. Pt to monitor weight over next few weeks. [x]   Therapeutic Exercise  []   Modalities of Choice:   []   Vasopneumatic pump    [] Lymphatouch     [x]   Manual Therapy  []   Neuromuscular Re-Education  []   Gait Training   []   Therapeutic Activity  []   Lymphedema precautions and prevention  []   Use of compression garment  [x]   Other body mechanics     Treatment Performed this visit :   25 minutes    Education on anatomy, condition and treatment   Manual treatment: MET of mid thoracic region in sitting and MET for right rib torsion in sidelying. Pt response to Tx:  Pt verbalized understanding of above. Plan for Next Visit:    Manual techniques for rib dysfunction and asymmetrical alignment.  Exercise as able for stretch and strengthening    Timed Code Treatment Minutes:  25   minutes   Total Treatment Time:  51  minutes    Plan of care sent to provider:      [x]Faxed  []Co-signature    (attempts: 1[x] 2 []3[])         Medicare Cap total YTD:    [x]N/A  Workers Comp Time Stamp  [x]N/A   Time In:   Time Out:    Thank you for the referral of this patient.       Trace Jarrett, PT MPT  License #[id number]

## 2022-07-07 ENCOUNTER — HOSPITAL ENCOUNTER (OUTPATIENT)
Dept: PHYSICAL THERAPY | Age: 52
Setting detail: THERAPIES SERIES
Discharge: HOME | End: 2022-07-07
Payer: OTHER GOVERNMENT

## 2022-07-07 PROCEDURE — 97110 THERAPEUTIC EXERCISES: CPT

## 2022-07-07 PROCEDURE — 97140 MANUAL THERAPY 1/> REGIONS: CPT

## 2022-07-07 NOTE — FLOWSHEET NOTE
Physical Therapy Daily Treatment Note    [x]Daily Tx Note    []Progress Note    [] Discharge Summary         Date:  2022    Patient Name:  Cristi Malcolm    :  1970  MRN: [id number]      Medical/Treatment Diagnosis Information:  · Diagnosis: R07.89 Bilateral chest wall pain localized to anterior latissimus dorsi bilaterally  · Treatment Diagnosis: Thoracic pain    Insurance/Certification information:  PT Insurance Information: [employer]- no authorization    Physician Information:  Referring Provider (secondary): Librado Velázquez MD      Plan of care sent to provider:      [x]Faxed   []Co-signature    (attempts: 1[x] 22 2 []3[])     Plan of care signed :  []  Yes  [x] No      Date of Patient follow up with Physician:     Is this a Progress Report:     []  Yes  [x]  No      If Yes:  Date Range for reporting period:  Beginning 2022  Ending    Progress report will be due (10 Rx or 30 days whichever is less):       Recertification will be due (POC Duration  / 90 days whichever is less):       Visit # Insurance Allowable Auth Required      []  Yes [x]  No        Functional Scale:  FOTO    Date    2022  Score   59  Predicted Score: 71  Predicted Visits:  10    Latex Allergy:  [x]NO      []YES  Preferred Language for Healthcare:   [x]English       []other:    RESTRICTIONS/PRECAUTIONS:  previous PE in 2021. Pt reports that she has recently lost weight with 15 lbs in last 3-4 months. 2017 pt was Dx with right breast atypical ductal hyperplasia. SUBJECTIVE:  Pt reports that pain night of therapy 5-6/10 and had felt like cement bags were on shoulder in scapular region. Pt has had this feeling in past but not recently. Pt reports at night is worst and sometimes pt has sweats. Pain level:  Patient reports pain is  1-2/10 pain at present on right side and end 3-4/10 pain at end.       Plan Moving Forward/ For next visit:   ·  Manual techniques for rib dysfunction and asymmetrical alignment. · Exercise as able for stretch and strengthening    OBJECTIVE: See eval    Exercises/Interventions:     Exercises in bold performed in department today. Items not bolded are carried forward from prior visits for continuity of the record. Exercise/Equipment Resistance/Repetitions HEP Other comments     Towel roll supine with stretch of pec region  1 minute []    Barrel stretch    3 reps hold 10 seconds to stretch right []        []        []        []        []        []        []        []          []         []        []        []        []        []        []        []        []      Therapeutic Exercise/Home Exercise Program:   13 minutes  Education on anatomy, condition and treatment  PROM: shoulder flex L180, R 180; abduction L 176, R 180; ER L 90 @ 90 degrees abduction, R 60 @ 90 degrees abduction; IR L 80 @ 85 degrees abduction, R 60 @85 degrees abduction    Therapeutic Activity:  0 minutes     Gait: 0 minutes    Neuromuscular Re-Education:  0 minutes      Canalith Repositioning Procedure:      Manual Therapy:  30 minutes  Assessment:   Mid thoracic tightness in left side compared to right  Increased elevation of bilateral shoulder region and increased rounded shoulders. Manual treatment: MET of mid thoracic region in sitting and MET for right rib torsion in sidelying. Right discomfort with supine position with increase shoulder roundness B relief with inferior glide and distraction; PA to mid and upper thoracic in prone, gentle;     Modalities: 0 minutes    ASSESSMENT:  Pt demonstrates thoracic pain with UE motion as well as possible rib dysfunction and weakness in scapular region bilaterally with rounded shoulders. Goals:   GOALS    Short Term Goals:  3   weeks Long Term Goals:   6  weeks   1). Establish HEP 1). Pt independent with HEP   2). Pain  3/10 or less at worst 2). Pain  1/10 or less at worst   3).  Pt able to maintain arms above head with minimal to no noted difficulty. 3). Increase B flex and abd to 175 with no note discomfort      4). 4). Pt is able to pull self up from pool with no noted difficulty. 5). 5). Pt demonstrates minimal to no noted tenderness with palpation. 6). 6). Overall Progression Towards Functional goals/ Treatment Progress Update:  [] Patient is progressing as expected towards functional goals listed. [] Progression is slowed due to complexities/Impairments listed. [] Progression has been slowed due to co-morbidities. [x] Plan just implemented, too soon to assess goals progression <30days   [] Goals require adjustment due to lack of progress  [] Patient is not progressing as expected and requires additional follow up with physician  [] Other    Prognosis for POC: [x] Good [] Fair  [] Poor    Patient requires continued skilled intervention: [x] Yes  [] No    Treatment/Activity Tolerance:  [x] Patient able to complete treatment  [] Patient limited by fatigue  [] Patient limited by pain    [] Patient limited by other medical complications  [] Other:         PLAN: See eval  [x] Continue per plan of care [] Alter current plan (see comments above)  [] Plan of care initiated [] Hold pending MD visit [] Discharge    Therapeutic Exercise and NMR EXR  [x] (42424) Provided verbal/tactile cueing for activities related to strengthening, flexibility, endurance, ROM  for improvements in proximal strength and core control with self care, mobility, lifting and ambulation.  [] (70735) Provided verbal/tactile cueing for activities related to improving balance, coordination, kinesthetic sense, posture, motor skill, proprioception  to assist with core control in self care, mobility, lifting, and ambulation.      Therapeutic Activities and Gait:    [] (39407 or 08734) Provided verbal/tactile cueing for activities related to improving balance, coordination, kinesthetic sense, posture, motor skill, proprioception and motor activation to allow for proper function  with self care and ADLs  [] (99013) Provided training and instruction to the patient for proper core and proximal hip recruitment and positioning with ambulation re-education     Home Exercise Program:    [x] (75282) Reviewed/Progressed HEP activities related to strengthening, flexibility, endurance, ROM of core, proximal hip and LE for functional self-care, mobility, lifting and ambulation   [] (31854) Reviewed/Progressed HEP activities related to improving balance, coordination, kinesthetic sense, posture, motor skill, proprioception of core, proximal hip and LE for self care, mobility, lifting, and ambulation      Manual Treatments:  PROM / STM / Oscillations-Mobs:  G-I, II, III, IV (PA's, Inf., Post.)  [x] (05093) Provided manual therapy to mobilize proximal hip and LS spine soft tissue/joints for the purpose of modulating pain, promoting relaxation,  increasing ROM, reducing/eliminating soft tissue swelling/inflammation/restriction, improving soft tissue extensibility and allowing for proper ROM for normal function with self care, mobility, lifting and ambulation. []CRP:  Canalith Repositioning procedure for the assessment, treatment and education of BPPV    Modalities:       Charges:  Timed Code Treatment Minutes: 43   Total Treatment Minutes: 43     Medicare Cap total YTD:        [x]N/A  Workers Comp Time Stamp  (Per CPT and Total Treatment) [x]N/A   Time In:   Time Out:       [] EVAL    [] Dry Needling  [x] TU(54503)   x 1    [] EStim Unattended 56520  [] NMR (05691)  x     [] Estim Attended  60986  [x] Manual (17095)  x 2     [] Mechanical Txn 56000  [] TA    x     [] Ultrasound  [] Gait   x  [] Vaso  [] CRP    [] Ionto           [] Other:        Electronically signed by: BARBARA Lopez,[id number]      Note: If patient does not return for scheduled/ recommended follow up visits, this note will serve as a discharge from care along with most recent update on progress.

## 2022-07-12 ENCOUNTER — HOSPITAL ENCOUNTER (OUTPATIENT)
Dept: PHYSICAL THERAPY | Age: 52
Setting detail: THERAPIES SERIES
Discharge: HOME | End: 2022-07-12
Payer: OTHER GOVERNMENT

## 2022-07-12 PROCEDURE — 97140 MANUAL THERAPY 1/> REGIONS: CPT

## 2022-07-12 PROCEDURE — 97110 THERAPEUTIC EXERCISES: CPT

## 2022-07-12 NOTE — FLOWSHEET NOTE
Physical Therapy Daily Treatment Note    [x]Daily Tx Note    []Progress Note    [] Discharge Summary         Date:  2022    Patient Name:  Mary Anne Heart    :  1970  MRN: [id number]      Medical/Treatment Diagnosis Information:  · Diagnosis: R07.89 Bilateral chest wall pain localized to anterior latissimus dorsi bilaterally  · Treatment Diagnosis: Thoracic pain    Insurance/Certification information:  PT Insurance Information: [employer]- no authorization    Physician Information:  Referring Provider (secondary): Jayne Duffy MD      Plan of care sent to provider:      [x]Faxed   []Co-signature    (attempts: 1[x] 22 2 []3[])     Plan of care signed :  []  Yes  [x] No      Date of Patient follow up with Physician:     Is this a Progress Report:     []  Yes  [x]  No      If Yes:  Date Range for reporting period:  Beginning 2022  Ending    Progress report will be due (10 Rx or 30 days whichever is less):       Recertification will be due (POC Duration  / 90 days whichever is less):       Visit # Insurance Allowable Auth Required   3/12   []  Yes [x]  No        Functional Scale:  FOTO    Date    2022  Score   59  Predicted Score: 71  Predicted Visits:  10    Latex Allergy:  [x]NO      []YES  Preferred Language for Healthcare:   [x]English       []other:    RESTRICTIONS/PRECAUTIONS:  previous PE in 2021. Pt reports that she has recently lost weight with 15 lbs in last 3-4 months. 2017 pt was Dx with right breast atypical ductal hyperplasia. SUBJECTIVE:  Pt reports that she is not feeling it as intense and at times not noticing it. Pain level:  Patient reports pain is B 3-4/10 pain at present on right side and end 4/10 pain at end more on right. Pt reports that in the middle of the night she has some throbbing and it seems to wake her up when she is on her back.      Plan Moving Forward/ For next visit:   ·  Manual techniques for rib dysfunction and asymmetrical alignment. · Exercise as able for stretch and strengthening    OBJECTIVE: See eval    Exercises/Interventions:     Exercises in bold performed in department today. Items not bolded are carried forward from prior visits for continuity of the record. Exercise/Equipment Resistance/Repetitions HEP Other comments     Towel roll supine with stretch of pec region  reviewed []    Barrel stretch    3 reps hold 10 seconds to stretch right []    Ceiling punches supine and figure 8   1x10 B []     inferior glide with towel roll   PRN []        []        []        []        []        []          []         []        []        []        []        []        []        []        []      Therapeutic Exercise/Home Exercise Program:   13 minutes  Education on anatomy, condition and treatment  PROM: Bilateral UE  Access code [email or web address]    Therapeutic Activity:  0 minutes     Gait: 0 minutes    Neuromuscular Re-Education:  0 minutes      Canalith Repositioning Procedure:      Manual Therapy:  30 minutes  Assessment:   Mid thoracic rotation to left side and SB to right  Increased elevation of left shoulder region and less rounded shoulders. Noted left rib torsion today. Manual treatment: MET of mid thoracic region in sitting and MET for left rib torsion in sidelying. Right discomfort with supine position with increase shoulder roundness B relief with inferior glide and distraction B, performed posterior glide; PA to mid and upper thoracic in prone, gentle;   sidelying scapular mobs B    Modalities: 0 minutes    ASSESSMENT:   Weakness in scapular region bilaterally with rounded shoulders as well as asymmetry. Goals:   GOALS    Short Term Goals:  3   weeks Long Term Goals:   6  weeks   1). Establish HEP 1). Pt independent with HEP   2). Pain  3/10 or less at worst 2). Pain  1/10 or less at worst   3). Pt able to maintain arms above head with minimal to no noted difficulty. 3).  Increase B flex and abd to 175 with no note discomfort      4). 4). Pt is able to pull self up from pool with no noted difficulty. 5). 5). Pt demonstrates minimal to no noted tenderness with palpation. 6). 6). Overall Progression Towards Functional goals/ Treatment Progress Update:  [] Patient is progressing as expected towards functional goals listed. [] Progression is slowed due to complexities/Impairments listed. [] Progression has been slowed due to co-morbidities. [x] Plan just implemented, too soon to assess goals progression <30days   [] Goals require adjustment due to lack of progress  [] Patient is not progressing as expected and requires additional follow up with physician  [] Other    Prognosis for POC: [x] Good [] Fair  [] Poor    Patient requires continued skilled intervention: [x] Yes  [] No    Treatment/Activity Tolerance:  [x] Patient able to complete treatment  [] Patient limited by fatigue  [] Patient limited by pain    [] Patient limited by other medical complications  [] Other:         PLAN: See eval  [x] Continue per plan of care [] Alter current plan (see comments above)  [] Plan of care initiated [] Hold pending MD visit [] Discharge    Therapeutic Exercise and NMR EXR  [x] (72138) Provided verbal/tactile cueing for activities related to strengthening, flexibility, endurance, ROM  for improvements in proximal strength and core control with self care, mobility, lifting and ambulation.  [] (97666) Provided verbal/tactile cueing for activities related to improving balance, coordination, kinesthetic sense, posture, motor skill, proprioception  to assist with core control in self care, mobility, lifting, and ambulation.      Therapeutic Activities and Gait:    [] (27872 or 19109) Provided verbal/tactile cueing for activities related to improving balance, coordination, kinesthetic sense, posture, motor skill, proprioception and motor activation to allow for proper function  with self care and ADLs  [] (57990) Provided training and instruction to the patient for proper core and proximal hip recruitment and positioning with ambulation re-education     Home Exercise Program:    [x] (68742) Reviewed/Progressed HEP activities related to strengthening, flexibility, endurance, ROM of core, proximal hip and LE for functional self-care, mobility, lifting and ambulation   [] (79660) Reviewed/Progressed HEP activities related to improving balance, coordination, kinesthetic sense, posture, motor skill, proprioception of core, proximal hip and LE for self care, mobility, lifting, and ambulation      Manual Treatments:  PROM / STM / Oscillations-Mobs:  G-I, II, III, IV (PA's, Inf., Post.)  [x] (28667) Provided manual therapy to mobilize proximal hip and LS spine soft tissue/joints for the purpose of modulating pain, promoting relaxation,  increasing ROM, reducing/eliminating soft tissue swelling/inflammation/restriction, improving soft tissue extensibility and allowing for proper ROM for normal function with self care, mobility, lifting and ambulation. []CRP:  Canalith Repositioning procedure for the assessment, treatment and education of BPPV    Modalities:       Charges:  Timed Code Treatment Minutes: 43   Total Treatment Minutes: 43     Medicare Cap total YTD:        [x]N/A  Workers Comp Time Stamp  (Per CPT and Total Treatment) [x]N/A   Time In:   Time Out:       [] EVAL    [] Dry Needling  [x] GQ(29442)   x 1    [] EStim Unattended 84969  [] NMR (22250)  x     [] Estim Attended  27770  [x] Manual (25776)  x 2     [] Mechanical Txn 66870  [] TA    x     [] Ultrasound  [] Gait   x  [] Vaso  [] CRP    [] Ionto           [] Other:        Electronically signed by: Bartolo Shields BY,[id number]      Note: If patient does not return for scheduled/ recommended follow up visits, this note will serve as a discharge from care along with most recent update on progress.

## 2022-07-14 ENCOUNTER — HOSPITAL ENCOUNTER (OUTPATIENT)
Dept: PHYSICAL THERAPY | Age: 52
Setting detail: THERAPIES SERIES
Discharge: HOME | End: 2022-07-14
Payer: OTHER GOVERNMENT

## 2022-07-14 PROCEDURE — 97140 MANUAL THERAPY 1/> REGIONS: CPT

## 2022-07-14 PROCEDURE — 97110 THERAPEUTIC EXERCISES: CPT

## 2022-07-14 NOTE — FLOWSHEET NOTE
Physical Therapy Daily Treatment Note    [x]Daily Tx Note    []Progress Note    [] Discharge Summary         Date:  2022    Patient Name:  Zabrina Goldsmith    :  1970  MRN: [id number]      Medical/Treatment Diagnosis Information:  · Diagnosis: R07.89 Bilateral chest wall pain localized to anterior latissimus dorsi bilaterally  · Treatment Diagnosis: Thoracic pain    Insurance/Certification information:  PT Insurance Information: [employer]- no authorization    Physician Information:  Referring Provider (secondary): Nikki Mishra MD      Plan of care sent to provider:      [x]Faxed   []Co-signature    (attempts: 1[x] 22 2 []3[])     Plan of care signed :  [x]  Yes  [] No  Signed 2022      Date of Patient follow up with Physician:     Is this a Progress Report:     []  Yes  [x]  No      If Yes:  Date Range for reporting period:  Beginning 2022  Ending    Progress report will be due (10 Rx or 30 days whichever is less):       Recertification will be due (POC Duration  / 90 days whichever is less):       Visit # Insurance Allowable Auth Required      []  Yes [x]  No        Functional Scale:  FOTO    Date    2022  Score   65  Predicted Score: 71  Predicted Visits:  10    Latex Allergy:  [x]NO      []YES  Preferred Language for Healthcare:   [x]English       []other:    RESTRICTIONS/PRECAUTIONS:  previous PE in 2021. Pt reports that she has recently lost weight with 15 lbs in last 3-4 months. 2017 pt was Dx with right breast atypical ductal hyperplasia. SUBJECTIVE:  Pt reports that she is feeling the discomfort all the time. Pt reports that she is still having problems with being on her back. Pain level:  Patient reports pain is R 3/10 and L 2/10 at posture axillary region. End of treatment 3/10     Plan Moving Forward/ For next visit:   ·  Manual techniques for rib dysfunction and asymmetrical alignment.   · Exercise as able for stretch and strengthening    OBJECTIVE: See eval    Exercises/Interventions:     Exercises in bold performed in department today. Items not bolded are carried forward from prior visits for continuity of the record. Exercise/Equipment Resistance/Repetitions HEP Other comments     Towel roll supine with stretch of pec region  reviewed []    Barrel stretch    3 reps hold 10 seconds to stretch right []    Ceiling punches supine and figure 8   1x10 B hold [] Pt reports that she had increased pain since. inferior glide with towel roll   PRN []        []        []        []        []        []          []         []        []        []        []        []        []        []        []      Therapeutic Exercise/Home Exercise Program:   10 minutes  PROM: Bilateral UE  Access code [email or web address]    Therapeutic Activity:  0 minutes     Gait: 0 minutes    Neuromuscular Re-Education:  0 minutes      Canalith Repositioning Procedure:      Manual Therapy:  33 minutes  Assessment:   Pelvic region bilateral symmetrical  Mid thoracic SB to right  Increased elevation of left shoulder region and less rounded shoulders. Noted left rib torsion today. Manual treatment: MET of mid thoracic region in sitting and MET for left rib torsion in sidelying. Right discomfort with supine position with increase shoulder roundness B relief with inferior glide and distraction B, performed posterior glide; PA to mid and upper thoracic in prone, gentle;   sidelying scapular mobs B  Thoracic distraction seated- last visit and this visit. x5    Modalities: 0 minutes    ASSESSMENT:   Pt demosntrates increased flexibility in thoracic region with PA. Pt did not tolerate addition of scapular strengthening exercises last visit. Goals:   GOALS    Short Term Goals:  3   weeks Long Term Goals:   6  weeks   1). Establish HEP 1). Pt independent with HEP   2). Pain  3/10 or less at worst 2). Pain  1/10 or less at worst   3).  Pt able to maintain arms above head with minimal to no noted difficulty. 3). Increase B flex and abd to 175 with no note discomfort      4). 4). Pt is able to pull self up from pool with no noted difficulty. 5). 5). Pt demonstrates minimal to no noted tenderness with palpation. 6). 6). Overall Progression Towards Functional goals/ Treatment Progress Update:  [] Patient is progressing as expected towards functional goals listed. [] Progression is slowed due to complexities/Impairments listed. [] Progression has been slowed due to co-morbidities. [x] Plan just implemented, too soon to assess goals progression <30days   [] Goals require adjustment due to lack of progress  [] Patient is not progressing as expected and requires additional follow up with physician  [] Other    Prognosis for POC: [x] Good [] Fair  [] Poor    Patient requires continued skilled intervention: [x] Yes  [] No    Treatment/Activity Tolerance:  [x] Patient able to complete treatment  [] Patient limited by fatigue  [] Patient limited by pain    [] Patient limited by other medical complications  [] Other:         PLAN: See eval  [x] Continue per plan of care [] Alter current plan (see comments above)  [] Plan of care initiated [] Hold pending MD visit [] Discharge    Therapeutic Exercise and NMR EXR  [x] (16703) Provided verbal/tactile cueing for activities related to strengthening, flexibility, endurance, ROM  for improvements in proximal strength and core control with self care, mobility, lifting and ambulation.  [] (22158) Provided verbal/tactile cueing for activities related to improving balance, coordination, kinesthetic sense, posture, motor skill, proprioception  to assist with core control in self care, mobility, lifting, and ambulation.      Therapeutic Activities and Gait:    [] (80864 or 01287) Provided verbal/tactile cueing for activities related to improving balance, coordination, kinesthetic sense, posture, motor skill, proprioception and motor activation to allow for proper function  with self care and ADLs  [] (30010) Provided training and instruction to the patient for proper core and proximal hip recruitment and positioning with ambulation re-education     Home Exercise Program:    [x] (75781) Reviewed/Progressed HEP activities related to strengthening, flexibility, endurance, ROM of core, proximal hip and LE for functional self-care, mobility, lifting and ambulation   [] (47273) Reviewed/Progressed HEP activities related to improving balance, coordination, kinesthetic sense, posture, motor skill, proprioception of core, proximal hip and LE for self care, mobility, lifting, and ambulation      Manual Treatments:  PROM / STM / Oscillations-Mobs:  G-I, II, III, IV (PA's, Inf., Post.)  [x] (26661) Provided manual therapy to mobilize proximal hip and LS spine soft tissue/joints for the purpose of modulating pain, promoting relaxation,  increasing ROM, reducing/eliminating soft tissue swelling/inflammation/restriction, improving soft tissue extensibility and allowing for proper ROM for normal function with self care, mobility, lifting and ambulation. []CRP:  Canalith Repositioning procedure for the assessment, treatment and education of BPPV    Modalities:       Charges:  Timed Code Treatment Minutes: 43   Total Treatment Minutes: 43     Medicare Cap total YTD:        [x]N/A  Workers Comp Time Stamp  (Per CPT and Total Treatment) [x]N/A   Time In:   Time Out:       [] EVAL    [] Dry Needling  [x] OS(19319)   x 1    [] EStim Unattended 08281  [] NMR (02752)  x     [] Estim Attended  26635  [x] Manual (74522)  x 2     [] Mechanical Txn 85570  [] TA    x     [] Ultrasound  [] Gait   x  [] Vaso  [] CRP    [] Ionto           [] Other:        Electronically signed by: Lisa Marie ,[id number]      Note: If patient does not return for scheduled/ recommended follow up visits, this note will serve as a discharge from care along with most recent update on progress.

## 2022-07-19 ENCOUNTER — HOSPITAL ENCOUNTER (OUTPATIENT)
Dept: PHYSICAL THERAPY | Age: 52
Setting detail: THERAPIES SERIES
Discharge: HOME | End: 2022-07-19
Payer: OTHER GOVERNMENT

## 2022-07-19 PROCEDURE — 97110 THERAPEUTIC EXERCISES: CPT

## 2022-07-19 PROCEDURE — 97140 MANUAL THERAPY 1/> REGIONS: CPT

## 2022-07-19 NOTE — FLOWSHEET NOTE
Physical Therapy Daily Treatment Note    [x]Daily Tx Note    []Progress Note    [] Discharge Summary         Date:  2022    Patient Name:  Mariela Tamez    :  1970  MRN: [id number]      Medical/Treatment Diagnosis Information:  Diagnosis: R07.89 Bilateral chest wall pain localized to anterior latissimus dorsi bilaterally  Treatment Diagnosis: Thoracic pain    Insurance/Certification information:  PT Insurance Information: GEHA- no authorization    Physician Information:  Referring Provider (secondary): Yenifer Kaufman MD      Plan of care sent to provider:      [x]Faxed   []Co-signature    (attempts: 1[x] 22 2 []3[])     Plan of care signed :  [x]  Yes  [] No  Signed 2022      Date of Patient follow up with Physician:     Is this a Progress Report:     []  Yes  [x]  No      If Yes:  Date Range for reporting period:  Beginning 2022  Ending    Progress report will be due (10 Rx or 30 days whichever is less):       Recertification will be due (POC Duration  / 90 days whichever is less):       Visit # Insurance Allowable Auth Required      []  Yes [x]  No        Functional Scale:  FOTO    Date    2022  Score   65  Predicted Score: 71  Predicted Visits:  10    Latex Allergy:  [x]NO      []YES  Preferred Language for Healthcare:   [x]English       []other:    RESTRICTIONS/PRECAUTIONS:  previous PE in 2021. Pt reports that she has recently lost weight with 15 lbs in last 3-4 months. 2017 pt was Dx with right breast atypical ductal hyperplasia. SUBJECTIVE:  Pt reports that she is feeling the discomfort all the time. Pt feels that she is less stiff throughout thoracic spine. Pt stated that she is having digestive problems with some foods and the she has lost a lot of weight since last MD appointment. 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         Pain level: left rib torsion in sidelying. Right discomfort with supine position with increase shoulder roundness B inferior glide and distraction B, performed posterior glide; PA to mid and upper thoracic in prone, gentle  sidelying scapular mobs B  Thoracic distraction seated    Modalities: 0 minutes    ASSESSMENT:   Pt demosntrates increased flexibility in thoracic region with PA. Pt did not tolerate addition of scapular strengthening exercises last visit or this visit with prone exercises. Pt having difficulty with scapular stability and strength. Goals:   GOALS    Short Term Goals:  3   weeks Long Term Goals:   6  weeks   1). Establish HEP 1). Pt independent with HEP   2). Pain  3/10 or less at worst 2). Pain  1/10 or less at worst   3). Pt able to maintain arms above head with minimal to no noted difficulty. 3). Increase B flex and abd to 175 with no note discomfort      4). 4). Pt is able to pull self up from pool with no noted difficulty. 5). 5). Pt demonstrates minimal to no noted tenderness with palpation. 6). 6). Overall Progression Towards Functional goals/ Treatment Progress Update:  [] Patient is progressing as expected towards functional goals listed. [] Progression is slowed due to complexities/Impairments listed. [] Progression has been slowed due to co-morbidities.   [x] Plan just implemented, too soon to assess goals progression <30days   [] Goals require adjustment due to lack of progress  [] Patient is not progressing as expected and requires additional follow up with physician  [] Other    Prognosis for POC: [x] Good [] Fair  [] Poor    Patient requires continued skilled intervention: [x] Yes  [] No    Treatment/Activity Tolerance:  [x] Patient able to complete treatment  [] Patient limited by fatigue  [] Patient limited by pain    [] Patient limited by other medical complications  [] Other:         PLAN: See eval  [x] Continue per plan of care [] Alter current plan (see comments above)  [] Plan of care initiated [] Hold pending MD visit [] Discharge    Therapeutic Exercise and NMR EXR  [x] (28218) Provided verbal/tactile cueing for activities related to strengthening, flexibility, endurance, ROM  for improvements in proximal strength and core control with self care, mobility, lifting and ambulation.  [] (44917) Provided verbal/tactile cueing for activities related to improving balance, coordination, kinesthetic sense, posture, motor skill, proprioception  to assist with core control in self care, mobility, lifting, and ambulation. Therapeutic Activities and Gait:    [] (83470 or 35310) Provided verbal/tactile cueing for activities related to improving balance, coordination, kinesthetic sense, posture, motor skill, proprioception and motor activation to allow for proper function  with self care and ADLs  [] (75636) Provided training and instruction to the patient for proper core and proximal hip recruitment and positioning with ambulation re-education     Home Exercise Program:    [x] (85043) Reviewed/Progressed HEP activities related to strengthening, flexibility, endurance, ROM of core, proximal hip and LE for functional self-care, mobility, lifting and ambulation   [] (91168) Reviewed/Progressed HEP activities related to improving balance, coordination, kinesthetic sense, posture, motor skill, proprioception of core, proximal hip and LE for self care, mobility, lifting, and ambulation      Manual Treatments:  PROM / STM / Oscillations-Mobs:  G-I, II, III, IV (PA's, Inf., Post.)  [x] (73661) Provided manual therapy to mobilize proximal hip and LS spine soft tissue/joints for the purpose of modulating pain, promoting relaxation,  increasing ROM, reducing/eliminating soft tissue swelling/inflammation/restriction, improving soft tissue extensibility and allowing for proper ROM for normal function with self care, mobility, lifting and ambulation.      []CRP:  Canalith Repositioning procedure for the assessment, treatment and education of BPPV    Modalities:       Charges:  Timed Code Treatment Minutes: 50   Total Treatment Minutes: 50     Medicare Cap total YTD:        [x]N/A  Workers Comp Time Stamp  (Per CPT and Total Treatment) [x]N/A   Time In:   Time Out:       [] EVAL    [] Dry Needling  [x] JT(54254)   x 1    [] EStim Unattended 85135  [] NMR (16997)  x     [] Estim Attended  63955  [x] Manual (44417)  x 2     [] Mechanical Txn 79529  [] TA    x     [] Ultrasound  [] Gait   x  [] Vaso  [] CRP    [] Ionto           [] Other:        Electronically signed by: DRAKE Vasquez,[id number]      Note: If patient does not return for scheduled/ recommended follow up visits, this note will serve as a discharge from care along with most recent update on progress.

## 2022-07-20 ENCOUNTER — PATIENT MESSAGE (OUTPATIENT)
Dept: FAMILY MEDICINE CLINIC | Age: 52
End: 2022-07-20

## 2022-07-20 NOTE — TELEPHONE ENCOUNTER
From: Abelina Sever  To: Bessie Nageotte  Sent: 7/20/2022 9:19 AM EDT  Subject: Anti-Inflammatory    Hi [facility], I hope you and the team are well! I have been going to PT (referred by my Breast doctor). I am having issues with my upper back/scapula area. Would it be possible to get an anti-inflammatory? Also, I have had a significant weight loss (around 13+pounds in the last couple of months) maybe digestive issues? Should I schedule time to come see you? Thanks for your assistance!  [facility]

## 2022-07-20 NOTE — TELEPHONE ENCOUNTER
Yes, I would definitely recommend f/u for unexplained weight loss. Did she ever f/u with GI for colon cancer screening? It doesn't look like she ever had a colonoscopy. ?  I can send over another referral order.

## 2022-07-20 NOTE — TELEPHONE ENCOUNTER
Called and left message for patient to call our office to schedule an appointment with her provider to discuss symptoms she is having and POC.     [phone] [facility] Phone)  No answer at this number  [phone] [facility]) left message at this number

## 2022-07-21 ENCOUNTER — HOSPITAL ENCOUNTER (OUTPATIENT)
Dept: PHYSICAL THERAPY | Age: 52
Setting detail: THERAPIES SERIES
Discharge: HOME | End: 2022-07-21
Payer: OTHER GOVERNMENT

## 2022-07-21 PROCEDURE — 97110 THERAPEUTIC EXERCISES: CPT

## 2022-07-21 PROCEDURE — 97140 MANUAL THERAPY 1/> REGIONS: CPT

## 2022-07-21 NOTE — TELEPHONE ENCOUNTER
Roya Johnson, but that was before her symptoms of unexplained weight loss. Please get her scheduled with myself and/or GI (she mentioned GI issues?) for further evaluation.

## 2022-07-21 NOTE — FLOWSHEET NOTE
Physical Therapy Daily Treatment Note    [x]Daily Tx Note    []Progress Note    [] Discharge Summary         Date:  2022    Patient Name:  Sanjana Driscoll    :  1970  MRN: [id number]      Medical/Treatment Diagnosis Information:  Diagnosis: R07.89 Bilateral chest wall pain localized to anterior latissimus dorsi bilaterally  Treatment Diagnosis: Thoracic pain    Insurance/Certification information:  PT Insurance Information: GEHA- no authorization    Physician Information:  Referring Provider (secondary): Rosy Simmons MD    Plan of care sent to provider:      [x]Faxed   []Co-signature    (attempts: 1[x] 22 2 []3[])     Plan of care signed :  [x]  Yes  [] No  Signed 2022      Date of Patient follow up with Physician:     Is this a Progress Report:     []  Yes  [x]  No      If Yes:  Date Range for reporting period:  Beginning 2022  Ending    Progress report will be due (10 Rx or 30 days whichever is less):      NV  Recertification will be due (POC Duration  / 90 days whichever is less):       Visit # Insurance Allowable Auth Required      []  Yes [x]  No        Functional Scale:  FOTO    Date    2022  Score   65  Predicted Score: 71  Predicted Visits:  10    Latex Allergy:  [x]NO      []YES  Preferred Language for Healthcare:   [x]English       []other:    RESTRICTIONS/PRECAUTIONS:  previous PE in 2021. Pt reports that she has recently lost weight with 15 lbs in last 3-4 months. 2017 pt was Dx with right breast atypical ductal hyperplasia. SUBJECTIVE:  Pt reports goes to PCP tomorrow to follow up on digestive issues. Pt reports that left region was irritated after las appointment 3-4/10 and then that night -6/10. Pain level:  Right scapular region 2/10, left scapular region 0/10; end right 3/10, left 0/10    Plan Moving Forward/ For next visit:    Manual techniques for rib dysfunction and asymmetrical alignment.   Exercise as able for stretch and strengthening    OBJECTIVE: See eval    Exercises/Interventions:     Exercises in bold performed in department today. Items not bolded are carried forward from prior visits for continuity of the record. Exercise/Equipment Resistance/Repetitions HEP Other comments     Towel roll supine with stretch of pec region  reviewed []    Barrel stretch    3 reps hold 10 seconds to stretch right []    Ceiling punches supine and figure 8   1x10 B hold [] Pt reports that she had increased pain since. inferior glide with towel roll   PRN []     Prone mid row and extension 1x10 bilaterally (last visit both were performed)  [] Instructed  to only do extension last visit secondary to increased irritation on right       []        []        []        []          []         []        []        []        []        []        []        []        []      Therapeutic Exercise/Home Exercise Program:   9 minutes  PROM: right UE  Access code [email or web address]  Instructed pt on lifting technique, making sure to keep things close to body. Recommend pt to follow up with PCP for digestion and weight loss issues. Therapeutic Activity:  0 minutes     Gait: 0 minutes    Neuromuscular Re-Education:  0 minutes      Canalith Repositioning Procedure:      Manual Therapy: 30  minutes  Assessment:   Pelvic region bilateral symmetrical  Mid thoracic symmetrical   Slight less elevation of left shoulder region and less rounded shoulders. Palpation moderate scapular tenderness on right. Manual treatment: MET of mid thoracic region in sitting and MET for left rib torsion in sidelying. Right discomfort with supine position with increase shoulder roundness R inferior glide and distraction R, performed posterior glide; PA to mid and upper thoracic in prone, gentle; gentle first rib mobilization.    sidelying scapular mobs B  Thoracic distraction seated  Gentle TPR in upper traps    Modalities: 0 minutes    ASSESSMENT:   Pt demosntrates increased motor skill, proprioception  to assist with core control in self care, mobility, lifting, and ambulation. Therapeutic Activities and Gait:    [] (02905 or 15394) Provided verbal/tactile cueing for activities related to improving balance, coordination, kinesthetic sense, posture, motor skill, proprioception and motor activation to allow for proper function  with self care and ADLs  [] (50311) Provided training and instruction to the patient for proper core and proximal hip recruitment and positioning with ambulation re-education     Home Exercise Program:    [x] (21675) Reviewed/Progressed HEP activities related to strengthening, flexibility, endurance, ROM of core, proximal hip and LE for functional self-care, mobility, lifting and ambulation   [] (00586) Reviewed/Progressed HEP activities related to improving balance, coordination, kinesthetic sense, posture, motor skill, proprioception of core, proximal hip and LE for self care, mobility, lifting, and ambulation      Manual Treatments:  PROM / STM / Oscillations-Mobs:  G-I, II, III, IV (PA's, Inf., Post.)  [x] (52927) Provided manual therapy to mobilize proximal hip and LS spine soft tissue/joints for the purpose of modulating pain, promoting relaxation,  increasing ROM, reducing/eliminating soft tissue swelling/inflammation/restriction, improving soft tissue extensibility and allowing for proper ROM for normal function with self care, mobility, lifting and ambulation.      []CRP:  Canalith Repositioning procedure for the assessment, treatment and education of BPPV    Modalities:       Charges:  Timed Code Treatment Minutes: 39   Total Treatment Minutes: 39     Medicare Cap total YTD:        [x]N/A  Workers Comp Time Stamp  (Per CPT and Total Treatment) [x]N/A   Time In:   Time Out:       [] EVAL    [] Dry Needling  [x] AR(48349)   x 1    [] EStim Unattended 60987  [] NMR (87622)  x     [] Estim Attended  73408  [x] Manual (15724)  x 2     [] Mechanical Txn 00524  [] TA    x     [] Ultrasound  [] Gait   x  [] Vaso  [] CRP    [] Ionto           [] Other:        Electronically signed by: KAYLYN Vang,[id number]      Note: If patient does not return for scheduled/ recommended follow up visits, this note will serve as a discharge from care along with most recent update on progress.

## 2022-07-22 ENCOUNTER — HOSPITAL ENCOUNTER (OUTPATIENT)
Dept: GENERAL RADIOLOGY | Age: 52
Discharge: HOME | End: 2022-07-22
Payer: OTHER GOVERNMENT

## 2022-07-22 ENCOUNTER — HOSPITAL ENCOUNTER (OUTPATIENT)
Age: 52
Discharge: HOME | End: 2022-07-22
Payer: OTHER GOVERNMENT

## 2022-07-22 ENCOUNTER — OFFICE VISIT (OUTPATIENT)
Dept: FAMILY MEDICINE CLINIC | Age: 52
End: 2022-07-22
Payer: OTHER GOVERNMENT

## 2022-07-22 VITALS
HEART RATE: 69 BPM | DIASTOLIC BLOOD PRESSURE: 74 MMHG | SYSTOLIC BLOOD PRESSURE: 108 MMHG | WEIGHT: 144 LBS | OXYGEN SATURATION: 98 % | BODY MASS INDEX: 21.9 KG/M2

## 2022-07-22 DIAGNOSIS — R63.4 UNEXPLAINED WEIGHT LOSS: ICD-10-CM

## 2022-07-22 DIAGNOSIS — R63.4 UNEXPLAINED WEIGHT LOSS: Primary | ICD-10-CM

## 2022-07-22 LAB
A/G RATIO: 2 (ref 1.1–2.2)
ALBUMIN SERPL-MCNC: 4.7 G/DL (ref 3.4–5)
ALP BLD-CCNC: 67 U/L (ref 40–129)
ALT SERPL-CCNC: 9 U/L (ref 10–40)
ANION GAP SERPL CALCULATED.3IONS-SCNC: 9 MMOL/L (ref 3–16)
AST SERPL-CCNC: 21 U/L (ref 15–37)
BASOPHILS ABSOLUTE: 0 K/UL (ref 0–0.2)
BASOPHILS RELATIVE PERCENT: 0.4 %
BILIRUB SERPL-MCNC: 0.4 MG/DL (ref 0–1)
BILIRUBIN, POC: NORMAL
BLOOD URINE, POC: NORMAL
BUN BLDV-MCNC: 24 MG/DL (ref 7–20)
C-REACTIVE PROTEIN: <3 MG/L (ref 0–5.1)
CALCIUM SERPL-MCNC: 9.7 MG/DL (ref 8.3–10.6)
CHLORIDE BLD-SCNC: 103 MMOL/L (ref 99–110)
CLARITY, POC: CLEAR
CO2: 29 MMOL/L (ref 21–32)
COLOR, POC: NORMAL
CREAT SERPL-MCNC: 0.8 MG/DL (ref 0.6–1.1)
EOSINOPHILS ABSOLUTE: 0 K/UL (ref 0–0.6)
EOSINOPHILS RELATIVE PERCENT: 0.1 %
GFR AFRICAN AMERICAN: >60
GFR NON-AFRICAN AMERICAN: >60
GLUCOSE BLD-MCNC: 94 MG/DL (ref 70–99)
GLUCOSE URINE, POC: NORMAL
HCT VFR BLD CALC: 39.2 % (ref 36–48)
HEMOGLOBIN: 13.4 G/DL (ref 12–16)
HEPATITIS C ANTIBODY INTERPRETATION: NORMAL
KETONES, POC: NORMAL
LEUKOCYTE EST, POC: NORMAL
LYMPHOCYTES ABSOLUTE: 1.9 K/UL (ref 1–5.1)
LYMPHOCYTES RELATIVE PERCENT: 31.9 %
MCH RBC QN AUTO: 31.6 PG (ref 26–34)
MCHC RBC AUTO-ENTMCNC: 34.1 G/DL (ref 31–36)
MCV RBC AUTO: 92.8 FL (ref 80–100)
MONOCYTES ABSOLUTE: 0.4 K/UL (ref 0–1.3)
MONOCYTES RELATIVE PERCENT: 7.2 %
NEUTROPHILS ABSOLUTE: 3.7 K/UL (ref 1.7–7.7)
NEUTROPHILS RELATIVE PERCENT: 60.4 %
NITRITE, POC: NORMAL
PDW BLD-RTO: 12.5 % (ref 12.4–15.4)
PH, POC: 6.5
PLATELET # BLD: 267 K/UL (ref 135–450)
PMV BLD AUTO: 9.4 FL (ref 5–10.5)
POTASSIUM REFLEX MAGNESIUM: 4.7 MMOL/L (ref 3.5–5.1)
PROTEIN, POC: NORMAL
RBC # BLD: 4.23 M/UL (ref 4–5.2)
SEDIMENTATION RATE, ERYTHROCYTE: 7 MM/HR (ref 0–30)
SODIUM BLD-SCNC: 141 MMOL/L (ref 136–145)
SPECIFIC GRAVITY, POC: >=1.03
T4 FREE: 1.3 NG/DL (ref 0.9–1.8)
TOTAL PROTEIN: 7.1 G/DL (ref 6.4–8.2)
TSH SERPL DL<=0.05 MIU/L-ACNC: 1.45 UIU/ML (ref 0.27–4.2)
UROBILINOGEN, POC: 0.2
WBC # BLD: 6.1 K/UL (ref 4–11)

## 2022-07-22 PROCEDURE — 81002 URINALYSIS NONAUTO W/O SCOPE: CPT | Performed by: NURSE PRACTITIONER

## 2022-07-22 PROCEDURE — 99214 OFFICE O/P EST MOD 30 MIN: CPT | Performed by: NURSE PRACTITIONER

## 2022-07-22 PROCEDURE — 71046 X-RAY EXAM CHEST 2 VIEWS: CPT

## 2022-07-22 RX ORDER — ASPIRIN 81 MG/1
81 TABLET ORAL DAILY
COMMUNITY

## 2022-07-22 ASSESSMENT — PATIENT HEALTH QUESTIONNAIRE - PHQ9
5. POOR APPETITE OR OVEREATING: 0
4. FEELING TIRED OR HAVING LITTLE ENERGY: 1
8. MOVING OR SPEAKING SO SLOWLY THAT OTHER PEOPLE COULD HAVE NOTICED. OR THE OPPOSITE, BEING SO FIGETY OR RESTLESS THAT YOU HAVE BEEN MOVING AROUND A LOT MORE THAN USUAL: 0
7. TROUBLE CONCENTRATING ON THINGS, SUCH AS READING THE NEWSPAPER OR WATCHING TELEVISION: 0
3. TROUBLE FALLING OR STAYING ASLEEP: 1
6. FEELING BAD ABOUT YOURSELF - OR THAT YOU ARE A FAILURE OR HAVE LET YOURSELF OR YOUR FAMILY DOWN: 0
1. LITTLE INTEREST OR PLEASURE IN DOING THINGS: 0
SUM OF ALL RESPONSES TO PHQ9 QUESTIONS 1 & 2: 0
SUM OF ALL RESPONSES TO PHQ QUESTIONS 1-9: 2
10. IF YOU CHECKED OFF ANY PROBLEMS, HOW DIFFICULT HAVE THESE PROBLEMS MADE IT FOR YOU TO DO YOUR WORK, TAKE CARE OF THINGS AT HOME, OR GET ALONG WITH OTHER PEOPLE: 0
SUM OF ALL RESPONSES TO PHQ QUESTIONS 1-9: 2
SUM OF ALL RESPONSES TO PHQ QUESTIONS 1-9: 2
2. FEELING DOWN, DEPRESSED OR HOPELESS: 0
SUM OF ALL RESPONSES TO PHQ QUESTIONS 1-9: 2
9. THOUGHTS THAT YOU WOULD BE BETTER OFF DEAD, OR OF HURTING YOURSELF: 0

## 2022-07-22 ASSESSMENT — ANXIETY QUESTIONNAIRES
1. FEELING NERVOUS, ANXIOUS, OR ON EDGE: 1
2. NOT BEING ABLE TO STOP OR CONTROL WORRYING: 2
6. BECOMING EASILY ANNOYED OR IRRITABLE: 1
3. WORRYING TOO MUCH ABOUT DIFFERENT THINGS: 1
IF YOU CHECKED OFF ANY PROBLEMS ON THIS QUESTIONNAIRE, HOW DIFFICULT HAVE THESE PROBLEMS MADE IT FOR YOU TO DO YOUR WORK, TAKE CARE OF THINGS AT HOME, OR GET ALONG WITH OTHER PEOPLE: NOT DIFFICULT AT ALL
5. BEING SO RESTLESS THAT IT IS HARD TO SIT STILL: 0
GAD7 TOTAL SCORE: 6
4. TROUBLE RELAXING: 1
7. FEELING AFRAID AS IF SOMETHING AWFUL MIGHT HAPPEN: 0

## 2022-07-22 NOTE — PROGRESS NOTES
2022     Jennie Arndt (:  1970) is a 47 y.o. female, here for evaluation of the following medical concerns:    HPI  Pt is here today to discuss unexplained weight loss. She has lost 15 lbs in the past 4-5 months without trying. States that she has occasional nausea, no vomiting. Does have night sweats and is sweating more during the day. Fatigue has also increased. Denies fever, black tarry stool, bloody stools or diarrhea. Colonoscopy done 2020 - Dr. Michael Dove at [facility], which was normal.  Recommended f/u in 5 years. Her brother was diagnosed with colon cancer at age 46. Mammogram done 2022 - no evidence of malignancy. CT chest to r/o PE done 2022 - normal.    Due for routine pap smear. [address] [place] [address]. Will request records. Recent labs reviewed. She states that she has not \"felt right\" since she had the recent PE's. She has had upper back/scapular pain that radiates into her shoulders, right side is worse than left side. She did f/u with ortho and cardiology last year, no findings. Following PT which makes the pain worse. Review of Systems   Constitutional:  Positive for diaphoresis, fatigue and unexpected weight change. Negative for activity change, appetite change, chills and fever. HENT: Negative. Eyes: Negative. Respiratory: Negative. Cardiovascular: Negative. Gastrointestinal: Negative. Denies nausea at this time. Genitourinary: Negative. Musculoskeletal:  Positive for back pain. Skin: Negative. Neurological: Negative. Psychiatric/Behavioral: Negative. Prior to Visit Medications    Medication Sig Taking? Authorizing Provider   aspirin 81 MG EC tablet Take 81 mg by mouth in the morning.  Yes Historical Provider, MD   Lactobacillus (PROBIOTIC ACIDOPHILUS PO) Take by mouth Yes Historical Provider, MD   OMEGA 3-6-9 FATTY ACIDS PO Take by mouth Yes Historical Provider, MD   Multiple Vitamins-Minerals (THERAPEUTIC MULTIVITAMIN-MINERALS) tablet Take 1 tablet by mouth daily Yes Historical Provider, MD   vitamin B-12 (CYANOCOBALAMIN) 100 MCG tablet Take 50 mcg by mouth daily Yes Historical Provider, MD   loratadine (CLARITIN) 10 MG tablet Take 10 mg by mouth daily Yes Historical Provider, MD   acetaminophen (TYLENOL) 500 MG tablet Take 500 mg by mouth every 6 hours as needed for Pain  Patient not taking: Reported on 7/22/2022  Historical Provider, MD        Social History     Tobacco Use    Smoking status: Never    Smokeless tobacco: Never   Substance Use Topics    Alcohol use: No        Vitals:    07/22/22 1437   BP: 108/74   Site: Left Upper Arm   Position: Sitting   Cuff Size: Medium Adult   Pulse: 69   SpO2: 98%   Weight: 144 lb (65.3 kg)     Estimated body mass index is 21.9 kg/m² as calculated from the following:    Height as of 6/27/22: 5' 8\" (1.727 m). Weight as of this encounter: 144 lb (65.3 kg). Physical Exam  Vitals reviewed. Constitutional:       General: She is not in acute distress. Appearance: Normal appearance. She is not ill-appearing, toxic-appearing or diaphoretic. HENT:      Head: Normocephalic. Cardiovascular:      Rate and Rhythm: Normal rate and regular rhythm. Pulses: Normal pulses. Heart sounds: Normal heart sounds. Pulmonary:      Effort: Pulmonary effort is normal.      Breath sounds: Normal breath sounds. Abdominal:      General: Abdomen is flat. Bowel sounds are normal.      Palpations: Abdomen is soft. Tenderness: There is no abdominal tenderness. Musculoskeletal:      Cervical back: Normal and normal range of motion. Thoracic back: Tenderness present. No swelling or deformity. Back:       Comments: Tenderness upon palpation of right trapezius muscle. Lymphadenopathy:      Cervical: No cervical adenopathy. Skin:     General: Skin is warm and dry. Neurological:      General: No focal deficit present.       Mental Status: She is alert and oriented to person, place, and time. Psychiatric:         Mood and Affect: Mood normal.         Behavior: Behavior normal.         Thought Content: Thought content normal.         Judgment: Judgment normal.     ASSESSMENT/PLAN:  1. Unexplained weight loss  Will check several labs, urine, stool and chest xray to r/o possible cause of unexplained weight loss. If all are negative, may need to refer back to GI for further evaluation. I am also concerned about recent PE\"s since she had COVID. Possible referral to hematology/oncology. - CBC with Auto Differential  - Comprehensive Metabolic Panel w/ Reflex to MG  - Hemoglobin A1C  - T4, Free  - TSH  - C-Reactive Protein  - Sedimentation Rate  - HIV Screen  - Hepatitis C Antibody  - BLOOD OCCULT STOOL #1; Future  - POCT Urinalysis no Micro  - XR CHEST (2 VW)    Return if symptoms worsen or fail to improve. An electronic signature was used to authenticate this note.     --JOB Gallardo - CNP on 7/22/2022 at 3:06 PM

## 2022-07-23 LAB
ESTIMATED AVERAGE GLUCOSE: 111.2 MG/DL
HBA1C MFR BLD: 5.5 %
HIV AG/AB: NORMAL
HIV ANTIGEN: NORMAL
HIV-1 ANTIBODY: NORMAL
HIV-2 AB: NORMAL

## 2022-07-25 ENCOUNTER — PATIENT MESSAGE (OUTPATIENT)
Dept: FAMILY MEDICINE CLINIC | Age: 52
End: 2022-07-25

## 2022-07-25 DIAGNOSIS — Q89.1 ADRENAL GLAND ANOMALY: Primary | ICD-10-CM

## 2022-07-25 ASSESSMENT — ENCOUNTER SYMPTOMS
RESPIRATORY NEGATIVE: 1
EYES NEGATIVE: 1
BACK PAIN: 1
GASTROINTESTINAL NEGATIVE: 1

## 2022-07-26 ENCOUNTER — TELEPHONE (OUTPATIENT)
Dept: FAMILY MEDICINE CLINIC | Age: 52
End: 2022-07-26

## 2022-07-26 ENCOUNTER — HOSPITAL ENCOUNTER (OUTPATIENT)
Dept: PHYSICAL THERAPY | Age: 52
Setting detail: THERAPIES SERIES
Discharge: HOME | End: 2022-07-26
Payer: OTHER GOVERNMENT

## 2022-07-26 DIAGNOSIS — R63.4 UNEXPLAINED WEIGHT LOSS: ICD-10-CM

## 2022-07-26 DIAGNOSIS — Q89.1 ADRENAL GLAND ANOMALY: Primary | ICD-10-CM

## 2022-07-26 LAB — OCCULT BLOOD DIAGNOSTIC: NORMAL

## 2022-07-26 PROCEDURE — 97110 THERAPEUTIC EXERCISES: CPT

## 2022-07-26 PROCEDURE — 97140 MANUAL THERAPY 1/> REGIONS: CPT

## 2022-07-26 NOTE — TELEPHONE ENCOUNTER
Called patient and gave her [address] [facility] [address] phone number [phone] to call and schedule her CT scan.

## 2022-07-26 NOTE — TELEPHONE ENCOUNTER
Incoming call from [place] with radiology asking for orders to be changed. I have PEND the order requested for upcoming CT.     Per Sherice use ADRENAL PROTOCOL in comments, CT Abdomen (not pelvis) w & w/o

## 2022-07-26 NOTE — PROGRESS NOTES
Physical Therapy Daily Treatment Note    [x]Daily Tx Note    [x]Progress Note    [] Discharge Summary         Date:  2022    Patient Name:  Sanjana Driscoll    :  1970  MRN: [id number]      Medical/Treatment Diagnosis Information:  Diagnosis: R07.89 Bilateral chest wall pain localized to anterior latissimus dorsi bilaterally  Treatment Diagnosis: Thoracic pain    Insurance/Certification information:  PT Insurance Information: [employer]- no authorization    Physician Information:  Referring Provider (secondary): Kenyetta Townsend MD    Plan of care sent to provider:      [x]Faxed   []Co-signature    (attempts: 1[x] 22 2 []3[])     Plan of care signed :  [x]  Yes  [] No  Signed 2022      Date of Patient follow up with Physician:     Is this a Progress Report:     []  Yes  [x]  No      If Yes:  Date Range for reporting period:  Beginning 2022  Ending 2022    Progress report will be due (10 Rx or 30 days whichever is less):       Recertification will be due (POC Duration  / 90 days whichever is less):     Visit # Insurance Allowable Auth Required      []  Yes [x]  No        Functional Scale:  FOTO    Date    2022  Score   65  Predicted Score: 71  Predicted Visits:  10    Latex Allergy:  [x]NO      []YES  Preferred Language for Healthcare:   [x]English       []other:    RESTRICTIONS/PRECAUTIONS:  previous PE in 2021. Pt reports that she has recently lost weight with 15 lbs in last 3-4 months. 2017 pt was Dx with right breast atypical ductal hyperplasia. SUBJECTIVE:  Pt reports that she did not have a good weekend. Pt was nauseous and had increased pain. Pain level:  Right scapular region 3/10, left axillary region 2/10; end right 3/10, left 0/10    Plan Moving Forward/ For next visit:    Manual techniques for rib dysfunction and asymmetrical alignment.   Exercise as able for stretch and strengthening    OBJECTIVE: See eval    Exercises/Interventions: Exercises in bold performed in department today. Items not bolded are carried forward from prior visits for continuity of the record. Exercise/Equipment Resistance/Repetitions HEP Other comments     Towel roll supine with stretch of pec region  reviewed []    Barrel stretch    3 reps hold 10 seconds to stretch right []    Ceiling punches supine and figure 8   1x10 B hold [] Pt reports that she had increased pain since. inferior glide with towel roll   PRN []     Prone mid row and extension 1x10 bilaterally (last visit both were performed)  [] Instructed  to only do extension last visit secondary to increased irritation on right       []        []        []        []          []         []        []        []        []        []        []        []        []      Therapeutic Exercise/Home Exercise Program:  15  minutes  PROM: bilateral UE  PROM: shoulder flex B 180 with distraction required on right to decrease pain; abduction bilateral 180. Access code [email or web address]  Instructed pt on lifting technique, making sure to keep things close to body. Recommend pt to follow up with PCP for digestion and weight loss issues. Therapeutic Activity:  0 minutes     Gait: 0 minutes    Neuromuscular Re-Education:  0 minutes      Canalith Repositioning Procedure:      Manual Therapy: 25 minutes  Assessment:   Assessed breast tissue with not scar tissue 2 areas as well as decreased tissue mobility in bilateral thoracic region distal to breast as well as right pec region. Pelvic region bilateral symmetrical  Mid thoracic symmetrical   Slight less elevation of left shoulder region and less rounded shoulders. Palpation moderate scapular tenderness on right. Manual treatment:   Tissue mobilization to right breast and bilateral thoracic region distal to breast; scar tissue mobilization of right breast region. Right arm skin tractioning and stretching.    MET of mid thoracic region in sitting and MET for left rib torsion in sidelying. Right discomfort with supine position with increase shoulder roundness R inferior glide and distraction R, performed posterior glide; PA to mid and upper thoracic in prone, gentle; gentle first rib mobilization. sidelying scapular mobs B  Thoracic distraction seated  Gentle TPR in upper traps    Modalities: 0 minutes    ASSESSMENT:   Pt demonstrates increased flexibility in right shoulder with distraction. Goals:   GOALS    Short Term Goals:  3   weeks Long Term Goals:   6  weeks   1). Establish HEP-MET 1). Pt independent with HEP   2). Pain  3/10 or less at worst- MET today 2). Pain  1/10 or less at worst   3). Pt able to maintain arms above head with minimal to no noted difficulty. 3). Increase B flex and abd to 175 with no note discomfort      4). 4). Pt is able to pull self up from pool with no noted difficulty. 5). 5). Pt demonstrates minimal to no noted tenderness with palpation. 6). 6). Overall Progression Towards Functional goals/ Treatment Progress Update:  [] Patient is progressing as expected towards functional goals listed. [] Progression is slowed due to complexities/Impairments listed. [] Progression has been slowed due to co-morbidities.   [x] Plan just implemented, too soon to assess goals progression <30days   [] Goals require adjustment due to lack of progress  [] Patient is not progressing as expected and requires additional follow up with physician  [] Other    Prognosis for POC: [x] Good [] Fair  [] Poor    Patient requires continued skilled intervention: [x] Yes  [] No    Treatment/Activity Tolerance:  [x] Patient able to complete treatment  [] Patient limited by fatigue  [] Patient limited by pain    [] Patient limited by other medical complications  [] Other:         PLAN: See eval  [x] Continue per plan of care [] Alter current plan (see comments above)  [] Plan of care initiated [] Hold pending MD visit [] Discharge  Will continue added tissue mobility to right axillary region and breast region. Therapeutic Exercise and NMR EXR  [x] (89736) Provided verbal/tactile cueing for activities related to strengthening, flexibility, endurance, ROM  for improvements in proximal strength and core control with self care, mobility, lifting and ambulation.  [] (89081) Provided verbal/tactile cueing for activities related to improving balance, coordination, kinesthetic sense, posture, motor skill, proprioception  to assist with core control in self care, mobility, lifting, and ambulation. Therapeutic Activities and Gait:    [] (87204 or 90804) Provided verbal/tactile cueing for activities related to improving balance, coordination, kinesthetic sense, posture, motor skill, proprioception and motor activation to allow for proper function  with self care and ADLs  [] (26877) Provided training and instruction to the patient for proper core and proximal hip recruitment and positioning with ambulation re-education     Home Exercise Program:    [x] (48227) Reviewed/Progressed HEP activities related to strengthening, flexibility, endurance, ROM of core, proximal hip and LE for functional self-care, mobility, lifting and ambulation   [] (42943) Reviewed/Progressed HEP activities related to improving balance, coordination, kinesthetic sense, posture, motor skill, proprioception of core, proximal hip and LE for self care, mobility, lifting, and ambulation      Manual Treatments:  PROM / STM / Oscillations-Mobs:  G-I, II, III, IV (PA's, Inf., Post.)  [x] (97376) Provided manual therapy to mobilize proximal hip and LS spine soft tissue/joints for the purpose of modulating pain, promoting relaxation,  increasing ROM, reducing/eliminating soft tissue swelling/inflammation/restriction, improving soft tissue extensibility and allowing for proper ROM for normal function with self care, mobility, lifting and ambulation.      []CRP:  Canalith Repositioning procedure for the assessment,

## 2022-07-28 ENCOUNTER — HOSPITAL ENCOUNTER (OUTPATIENT)
Dept: PHYSICAL THERAPY | Age: 52
Setting detail: THERAPIES SERIES
Discharge: HOME | End: 2022-07-28
Payer: OTHER GOVERNMENT

## 2022-07-28 PROCEDURE — 97110 THERAPEUTIC EXERCISES: CPT

## 2022-07-28 PROCEDURE — 97140 MANUAL THERAPY 1/> REGIONS: CPT

## 2022-07-28 NOTE — FLOWSHEET NOTE
Physical Therapy Daily Treatment Note    [x]Daily Tx Note    []Progress Note    [] Discharge Summary         Date:  2022    Patient Name:  Wilfrid Cobb    :  1970  MRN: [id number]      Medical/Treatment Diagnosis Information:  Diagnosis: R07.89 Bilateral chest wall pain localized to anterior latissimus dorsi bilaterally  Treatment Diagnosis: Thoracic pain    Insurance/Certification information:  PT Insurance Information: [employer]- no authorization    Physician Information:  Referring Provider (secondary): Blessing Hedrick MD    Plan of care sent to provider:      [x]Faxed   []Co-signature    (attempts: 1[x] 22 2 []3[])     Plan of care signed :  [x]  Yes  [] No  Signed 2022      Date of Patient follow up with Physician:     Is this a Progress Report:     []  Yes  [x]  No      If Yes:  Date Range for reporting period:  Beginning 2022  Ending 2022    Progress report will be due (10 Rx or 30 days whichever is less):       Recertification will be due (POC Duration  / 90 days whichever is less):     Visit # Insurance Allowable Auth Required      []  Yes [x]  No        Functional Scale:  FOTO    Date    2022  Score   65  Predicted Score: 71  Predicted Visits:  10    Latex Allergy:  [x]NO      []YES  Preferred Language for Healthcare:   [x]English       []other:    RESTRICTIONS/PRECAUTIONS:  previous PE in 2021. Pt reports that she has recently lost weight with 15 lbs in last 3-4 months. 2017 pt was Dx with right breast atypical ductal hyperplasia. SUBJECTIVE:  Pt took tylenol  Tu night instead of Advil. Pt reports that she had to take Advil today around 5 AM. Pt reports that she did not have a good night, 4/10. Pt reports that she did prone extension and barrel stretch exercises only. Pt has CAT scan on Monday.      Pain level:  Right scapular region 3/10, left scapula region 1/10; end aching tenderness right 4/10, left 2-3/10 after scapular mobilization. Plan Moving Forward/ For next visit:   Manual techniques for rib dysfunction and asymmetrical alignment; manual scapula mobilization  Exercise as able for stretch and strengthening    OBJECTIVE: See eval  Observation: Increased stiffness with abduction on right with increased pain after coming down      Exercises/Interventions:     Exercises in bold performed in department today. Items not bolded are carried forward from prior visits for continuity of the record. Exercise/Equipment Resistance/Repetitions HEP Other comments     Towel roll supine with stretch of pec region  reviewed []    Barrel stretch    3 reps hold 10 seconds to stretch right []    Ceiling punches supine and figure 8   1x10 B hold [] Pt reports that she had increased pain since. inferior glide with towel roll   PRN []     Prone mid row and extension 1x10 bilaterally (last visit both were performed)  [] Instructed  to only do extension last visit secondary to increased irritation on right   Shoulder shrugs and shoulder blade squeezes   1x10 []        []        []        []          []         []        []        []        []        []        []        []        []      Therapeutic Exercise/Home Exercise Program:  10  minutes  PROM: bilateral UE  Access code [email or web address]    Therapeutic Activity:  0 minutes     Gait: 0 minutes    Neuromuscular Re-Education:  0 minutes      Canalith Repositioning Procedure:      Manual Therapy: 28 minutes  Assessment:   Assessed breast tissue still with noted scar tissue 2 areas as well as decreased tissue mobility in bilateral thoracic region distal to breast as well as right pec region. Pelvic region bilateral symmetrical  Mid thoracic symmetrical   Slight less elevation of left shoulder region and less rounded shoulders. Palpation moderate scapular tenderness on right.    Manual treatment:   Tissue mobilization to right breast and bilateral thoracic region distal to breast; scar tissue mobilization of right breast region. Right arm skin tractioning and stretching. MET of mid thoracic region in sitting and MET for left rib torsion in sidelying. Right discomfort with supine position with increase shoulder roundness R inferior glide and distraction R, performed posterior glide; PA to mid and upper thoracic in prone, gentle; gentle first rib mobilization. sidelying scapular mobs B, severe restriction on right scapula compared to right. Thoracic distraction seated  Gentle TPR in upper traps    Modalities: 0 minutes    ASSESSMENT:   Pt demonstrates noted decrease in scapular mobilization in right compared to left scapular region. Goals:   GOALS    Short Term Goals:  3   weeks Long Term Goals:   6  weeks   1). Establish HEP-MET 1). Pt independent with HEP   2). Pain  3/10 or less at worst- MET today 2). Pain  1/10 or less at worst   3). Pt able to maintain arms above head with minimal to no noted difficulty. 3). Increase B flex and abd to 175 with no note discomfort      4). 4). Pt is able to pull self up from pool with no noted difficulty. 5). 5). Pt demonstrates minimal to no noted tenderness with palpation. 6). 6). Overall Progression Towards Functional goals/ Treatment Progress Update:  [] Patient is progressing as expected towards functional goals listed. [] Progression is slowed due to complexities/Impairments listed. [] Progression has been slowed due to co-morbidities.   [x] Plan just implemented, too soon to assess goals progression <30days   [] Goals require adjustment due to lack of progress  [] Patient is not progressing as expected and requires additional follow up with physician  [] Other    Prognosis for POC: [x] Good [] Fair  [] Poor    Patient requires continued skilled intervention: [x] Yes  [] No    Treatment/Activity Tolerance:  [x] Patient able to complete treatment  [] Patient limited by fatigue  [] Patient limited by pain    [] Patient limited by other medical complications  [] Other:         PLAN: See eval  [x] Continue per plan of care [] Alter current plan (see comments above)  [] Plan of care initiated [] Hold pending MD visit [] Discharge  Will continue added tissue mobility to right axillary region and breast region as well as scapular mobilization. Therapeutic Exercise and NMR EXR  [x] (12436) Provided verbal/tactile cueing for activities related to strengthening, flexibility, endurance, ROM  for improvements in proximal strength and core control with self care, mobility, lifting and ambulation.  [] (38528) Provided verbal/tactile cueing for activities related to improving balance, coordination, kinesthetic sense, posture, motor skill, proprioception  to assist with core control in self care, mobility, lifting, and ambulation.      Therapeutic Activities and Gait:    [] (98198 or 51481) Provided verbal/tactile cueing for activities related to improving balance, coordination, kinesthetic sense, posture, motor skill, proprioception and motor activation to allow for proper function  with self care and ADLs  [] (00323) Provided training and instruction to the patient for proper core and proximal hip recruitment and positioning with ambulation re-education     Home Exercise Program:    [x] (02297) Reviewed/Progressed HEP activities related to strengthening, flexibility, endurance, ROM of core, proximal hip and LE for functional self-care, mobility, lifting and ambulation   [] (19722) Reviewed/Progressed HEP activities related to improving balance, coordination, kinesthetic sense, posture, motor skill, proprioception of core, proximal hip and LE for self care, mobility, lifting, and ambulation      Manual Treatments:  PROM / STM / Oscillations-Mobs:  G-I, II, III, IV (PA's, Inf., Post.)  [x] (66090) Provided manual therapy to mobilize proximal hip and LS spine soft tissue/joints for the purpose of modulating pain, promoting relaxation,  increasing ROM, reducing/eliminating soft tissue swelling/inflammation/restriction, improving soft tissue extensibility and allowing for proper ROM for normal function with self care, mobility, lifting and ambulation. []CRP:  Canalith Repositioning procedure for the assessment, treatment and education of BPPV    Modalities:       Charges:  Timed Code Treatment Minutes: 38   Total Treatment Minutes: 38     Medicare Cap total YTD:        [x]N/A  Workers Comp Time Stamp  (Per CPT and Total Treatment) [x]N/A   Time In:   Time Out:       [] EVAL    [] Dry Needling  [x] EZ(58627)   x  1   [] EStim Unattended 35812  [] NMR (90270)  x     [] Estim Attended  54164  [x] Manual (65828)  x 2     [] Mechanical Txn 45656  [] TA    x     [] Ultrasound  [] Gait   x  [] Vaso  [] CRP    [] Ionto           [] Other:        Electronically signed by: ESTEBAN Jackson,[id number]      Note: If patient does not return for scheduled/ recommended follow up visits, this note will serve as a discharge from care along with most recent update on progress.

## 2022-08-01 ENCOUNTER — HOSPITAL ENCOUNTER (OUTPATIENT)
Dept: CT IMAGING | Age: 52
Discharge: HOME | End: 2022-08-01
Payer: OTHER GOVERNMENT

## 2022-08-01 DIAGNOSIS — Q89.1 ADRENAL GLAND ANOMALY: ICD-10-CM

## 2022-08-01 PROCEDURE — 74150 CT ABDOMEN W/O CONTRAST: CPT

## 2022-08-02 ENCOUNTER — HOSPITAL ENCOUNTER (OUTPATIENT)
Dept: PHYSICAL THERAPY | Age: 52
Setting detail: THERAPIES SERIES
Discharge: HOME | End: 2022-08-02
Payer: OTHER GOVERNMENT

## 2022-08-02 PROCEDURE — 97110 THERAPEUTIC EXERCISES: CPT

## 2022-08-02 PROCEDURE — 97140 MANUAL THERAPY 1/> REGIONS: CPT

## 2022-08-02 NOTE — FLOWSHEET NOTE
Physical Therapy Daily Treatment Note    [x]Daily Tx Note    []Progress Note    [] Discharge Summary         Date:  2022    Patient Name:  Neno Maurer    :  1970  MRN: [id number]      Medical/Treatment Diagnosis Information:  Diagnosis: R07.89 Bilateral chest wall pain localized to anterior latissimus dorsi bilaterally  Treatment Diagnosis: Thoracic pain    Insurance/Certification information:  PT Insurance Information: [employer]- no authorization    Physician Information:  Referring Provider (secondary): Medhat Trujillo MD    Plan of care sent to provider:      [x]Faxed   []Co-signature    (attempts: 1[x] 22 2 []3[])     Plan of care signed :  [x]  Yes  [] No  Signed 2022      Date of Patient follow up with Physician:     Is this a Progress Report:     []  Yes  [x]  No      If Yes:  Date Range for reporting period:  Beginning 2022  Ending 2022    Progress report will be due (10 Rx or 30 days whichever is less):       Recertification will be due (POC Duration  / 90 days whichever is less):     Visit # Insurance Allowable Auth Required      []  Yes [x]  No        Functional Scale:  FOTO    Date    2022  Score   57  Predicted Score: 71  Predicted Visits:  10    Latex Allergy:  [x]NO      []YES  Preferred Language for Healthcare:   [x]English       []other:    RESTRICTIONS/PRECAUTIONS:  previous PE in 2021. Pt reports that she has recently lost weight with 15 lbs in last 3-4 months. 2017 pt was Dx with right breast atypical ductal hyperplasia. SUBJECTIVE:  Pt reports that she had MRI and it was ok.      Pain level:  Right scapular region 2/10 then 3/10 after 1 minute on bike; increased to 4/10 with exercise   ER resistance increases pain and is sharp then dull and aching; pain with horizontal abduction     Plan Moving Forward/ For next visit:   Manual techniques for rib dysfunction and asymmetrical alignment; manual scapula mobilization  Exercise as able for stretch and strengthening    OBJECTIVE: See eval  Observation: Increased stiffness with abduction on right with increased pain after coming down    Exercises/Interventions:     Exercises in bold performed in department today. Items not bolded are carried forward from prior visits for continuity of the record. Exercise/Equipment Resistance/Repetitions HEP Other comments     Towel roll supine with stretch of pec region  reviewed []    Barrel stretch    3 reps hold 10 seconds to stretch right []    Ceiling punches supine and figure 8   1x10 B hold [] Pt reports that she had increased pain since. inferior glide with towel roll   PRN []     Prone mid row and extension 1x10 bilaterally (last visit both were performed)  [] Minimal to no noted discomfort with performing but increased pain after. Shoulder shrugs and shoulder blade squeezes   1x10 []        []        []        []          []         []        []        []        []        []        []        []      UE bike  Forward 1 minute attempted backward with increase pain stopped. []      Therapeutic Exercise/Home Exercise Program:  10  minutes  PROM: bilateral UE  Access code [email or web address]    Therapeutic Activity:  0 minutes     Gait: 0 minutes    Neuromuscular Re-Education:  0 minutes      Canalith Repositioning Procedure:      Manual Therapy: 28 minutes  Assessment:   Assessed breast tissue still with noted scar tissue 2 areas as well as decreased tissue mobility in bilateral thoracic region distal to breast as well as right pec region. Pelvic region bilateral symmetrical  Mid thoracic symmetrical   Slight less elevation of left shoulder region and less rounded shoulders. Palpation moderate scapular tenderness on right. Manual treatment:   Tissue mobilization to right breast and bilateral thoracic region distal to breast; scar tissue mobilization of right breast region. Right arm skin tractioning and stretching.    MET of mid thoracic region in sitting and MET for left rib torsion in sidelying. Right discomfort with supine position with increase shoulder roundness R inferior glide and distraction R, performed posterior glide; PA to mid and upper thoracic in prone, gentle; gentle first rib mobilization. sidelying scapular mobs B, severe to moderate restriction on right scapula compared to right. Thoracic distraction seated  Gentle TPR in upper traps  Rhythmic stabilization     Modalities: 0 minutes    ASSESSMENT:   Pt demonstrates noted slight improvement with scapular mobility in right compared to left. Goals:   GOALS    Short Term Goals:  3   weeks Long Term Goals:   6  weeks   1). Establish HEP-MET 1). Pt independent with HEP   2). Pain  3/10 or less at worst- MET today 2). Pain  1/10 or less at worst   3). Pt able to maintain arms above head with minimal to no noted difficulty. 3). Increase B flex and abd to 175 with no note discomfort      4). 4). Pt is able to pull self up from pool with no noted difficulty. 5). 5). Pt demonstrates minimal to no noted tenderness with palpation. 6). 6). Overall Progression Towards Functional goals/ Treatment Progress Update:  [] Patient is progressing as expected towards functional goals listed. [] Progression is slowed due to complexities/Impairments listed. [] Progression has been slowed due to co-morbidities.   [x] Plan just implemented, too soon to assess goals progression <30days   [] Goals require adjustment due to lack of progress  [] Patient is not progressing as expected and requires additional follow up with physician  [] Other    Prognosis for POC: [x] Good [] Fair  [] Poor    Patient requires continued skilled intervention: [x] Yes  [] No    Treatment/Activity Tolerance:  [x] Patient able to complete treatment  [] Patient limited by fatigue  [] Patient limited by pain    [] Patient limited by other medical complications  [] Other:       PLAN: See eval  [x] Continue per plan of care [] Alter current plan (see comments above)  [] Plan of care initiated [] Hold pending MD visit [] Discharge  Will continue added scapular mobilization and more scapular exercise. Therapeutic Exercise and NMR EXR  [x] (93307) Provided verbal/tactile cueing for activities related to strengthening, flexibility, endurance, ROM  for improvements in proximal strength and core control with self care, mobility, lifting and ambulation.  [] (92361) Provided verbal/tactile cueing for activities related to improving balance, coordination, kinesthetic sense, posture, motor skill, proprioception  to assist with core control in self care, mobility, lifting, and ambulation.      Therapeutic Activities and Gait:    [] (72081 or 94744) Provided verbal/tactile cueing for activities related to improving balance, coordination, kinesthetic sense, posture, motor skill, proprioception and motor activation to allow for proper function  with self care and ADLs  [] (77112) Provided training and instruction to the patient for proper core and proximal hip recruitment and positioning with ambulation re-education     Home Exercise Program:    [x] (84528) Reviewed/Progressed HEP activities related to strengthening, flexibility, endurance, ROM of core, proximal hip and LE for functional self-care, mobility, lifting and ambulation   [] (78181) Reviewed/Progressed HEP activities related to improving balance, coordination, kinesthetic sense, posture, motor skill, proprioception of core, proximal hip and LE for self care, mobility, lifting, and ambulation      Manual Treatments:  PROM / STM / Oscillations-Mobs:  G-I, II, III, IV (PA's, Inf., Post.)  [x] (07482) Provided manual therapy to mobilize proximal hip and LS spine soft tissue/joints for the purpose of modulating pain, promoting relaxation,  increasing ROM, reducing/eliminating soft tissue swelling/inflammation/restriction, improving soft tissue extensibility and allowing for proper ROM for normal function with self care, mobility, lifting and ambulation. []CRP:  Canalith Repositioning procedure for the assessment, treatment and education of BPPV    Modalities:       Charges:  Timed Code Treatment Minutes: 38   Total Treatment Minutes: 38     Medicare Cap total YTD:        [x]N/A  Workers Comp Time Stamp  (Per CPT and Total Treatment) [x]N/A   Time In:   Time Out:       [] EVAL    [] Dry Needling  [x] YH(59138)   x  1   [] EStim Unattended 80070  [] NMR (12544)  x     [] Estim Attended  70871  [x] Manual (81210)  x 2     [] Mechanical Txn 40712  [] TA    x     [] Ultrasound  [] Gait   x  [] Vaso  [] CRP    [] Ionto           [] Other:        Electronically signed by: GABRIELLE Rice,[id number]      Note: If patient does not return for scheduled/ recommended follow up visits, this note will serve as a discharge from care along with most recent update on progress.

## 2022-08-03 DIAGNOSIS — Q89.1 ADRENAL GLAND ANOMALY: Primary | ICD-10-CM

## 2022-08-04 ENCOUNTER — APPOINTMENT (OUTPATIENT)
Dept: PHYSICAL THERAPY | Age: 52
End: 2022-08-04
Payer: OTHER GOVERNMENT

## 2022-08-16 ENCOUNTER — HOSPITAL ENCOUNTER (OUTPATIENT)
Dept: PHYSICAL THERAPY | Age: 52
Setting detail: THERAPIES SERIES
Discharge: HOME | End: 2022-08-16
Payer: OTHER GOVERNMENT

## 2022-08-16 DIAGNOSIS — Q89.1 ADRENAL GLAND ANOMALY: ICD-10-CM

## 2022-08-16 PROCEDURE — 97110 THERAPEUTIC EXERCISES: CPT

## 2022-08-16 PROCEDURE — 97035 APP MDLTY 1+ULTRASOUND EA 15: CPT

## 2022-08-16 NOTE — PROGRESS NOTES
Physical Therapy Daily Treatment Note    [x]Daily Tx Note    [x]Progress Note    [] Discharge Summary         Date:  2022    Patient Name:  Sanjana Driscoll    :  1970  MRN: [id number]      Medical/Treatment Diagnosis Information:  Diagnosis: R07.89 Bilateral chest wall pain localized to anterior latissimus dorsi bilaterally  Treatment Diagnosis: Thoracic pain    Insurance/Certification information:  PT Insurance Information: [employer]- no authorization    Physician Information:  Referring Provider (secondary): Kenyetta Townsend MD    Plan of care sent to provider:      [x]Faxed   []Co-signature    (attempts: 1[x] 22 2 []3[])     Plan of care signed :  [x]  Yes  [] No  Signed 2022      Date of Patient follow up with Physician:     Is this a Progress Report:     [x]  Yes  []  No      If Yes:  Date Range for reporting period:  Beginning 2022  Ending 2022    Progress report will be due (10 Rx or 30 days whichever is less):       Recertification will be due (POC Duration  / 90 days whichever is less):     Visit # Insurance Allowable Auth Required   10/12   []  Yes [x]  No        Functional Scale:  FOTO    Date    2022  Score   65  Predicted Score: 71  Predicted Visits:  10    Latex Allergy:  [x]NO      []YES  Preferred Language for Healthcare:   [x]English       []other:    RESTRICTIONS/PRECAUTIONS:  previous PE in 2021. Pt reports that she has recently lost weight with 15 lbs in last 3-4 months. 2017 pt was Dx with right breast atypical ductal hyperplasia. SUBJECTIVE:  Pt reports that she has been out of town and had to do a lot more lifting. Pt is going to endocrinologist to follow up on adrenal gland.     Pain level:  Right scapular region and right shoulder 2/10     Plan Moving Forward/ For next visit:   Manual techniques for rib dysfunction and asymmetrical alignment; manual scapula mobilization  Exercise as able for stretch and strengthening    OBJECTIVE:   Observation: Increased stiffness with abduction on right with increased pain after coming down    Exercises/Interventions:     Exercises in bold performed in department today. Items not bolded are carried forward from prior visits for continuity of the record. Exercise/Equipment Resistance/Repetitions HEP Other comments     Towel roll supine with stretch of pec region  reviewed []    Barrel stretch    3 reps hold 10 seconds to stretch right []    Ceiling punches supine and figure 8   1x10 B hold [] Pt reports that she had increased pain since. inferior glide with towel roll   PRN []     Prone mid row and extension 1x10 bilaterally (last visit both were performed)  [] Minimal to no noted discomfort with performing but increased pain after. Shoulder shrugs and shoulder blade squeezes   1x10 []    Adduction red theraband  1x10  []    Mid and low rows red theraband   1x10 [] Issued red theraband       []          []         []        []        []        []        []        []        []      UE bike  Forward 1 minute attempted backward with increase pain stopped. []      Therapeutic Exercise/Home Exercise Program:   30 minutes  PROM: bilateral UE  See above  Access code [email or web address]  Special tests: impingement negative bilaterally. AROM: flexion right 162, left 162; abduction right 130 with pain, left 169; ER  T2, IR left scapular angle right mid thoracic  Strength: flexion 4/5 bilateral and ER 4/5 right    Therapeutic Activity:  0 minutes    Gait: 0 minutes    Neuromuscular Re-Education:  0 minutes      Canalith Repositioning Procedure:      Manual Therapy: 0 minutes  Assessment:   Assessed breast tissue still with noted scar tissue 2 areas as well as decreased tissue mobility in bilateral thoracic region distal to breast as well as right pec region.    Pelvic region bilateral symmetrical  Mid thoracic symmetrical   Slight less elevation of left shoulder region and less rounded shoulders. Palpation moderate scapular tenderness on right. Manual treatment:   Tissue mobilization to right breast and bilateral thoracic region distal to breast; scar tissue mobilization of right breast region. Right arm skin tractioning and stretching. MET of mid thoracic region in sitting and MET for left rib torsion in sidelying. Right discomfort with supine position with increase shoulder roundness R inferior glide and distraction R, performed posterior glide; PA to mid and upper thoracic in prone, gentle; gentle first rib mobilization. sidelying scapular mobs B, severe to moderate restriction on right scapula compared to right. Thoracic distraction seated  Gentle TPR in upper traps  Rhythmic stabilization     Modalities: 10 minutes including set up  US to right R/C region secondary to tenderness noted with palpation; 50% 1.5 w/cm2 1 mHz     ASSESSMENT:   Pt demonstrates increased irritation with right rotator cuff region. Goals:   GOALS    Short Term Goals:  3   weeks Long Term Goals:   6  weeks   1). Establish HEP-MET 1). Pt independent with HEP   2). Pain  3/10 or less at worst- MET today 2). Pain  1/10 or less at worst   3). Pt able to maintain arms above head with minimal to no noted difficulty. 3). Increase B flex and abd to 175 with no note discomfort- Progressing      4). 4). Pt is able to pull self up from pool with no noted difficulty. 5). 5). Pt demonstrates minimal to no noted tenderness with palpation. 6). 6). Overall Progression Towards Functional goals/ Treatment Progress Update:  [] Patient is progressing as expected towards functional goals listed. [x] Progression is slowed due to complexities/Impairments listed. [] Progression has been slowed due to co-morbidities.   [] Plan just implemented, too soon to assess goals progression <30days   [] Goals require adjustment due to lack of progress  [] Patient is not progressing as expected and requires additional follow up with physician  [] Other    Prognosis for POC: [x] Good [] Fair  [] Poor    Patient requires continued skilled intervention: [x] Yes  [] No    Treatment/Activity Tolerance:  [x] Patient able to complete treatment  [] Patient limited by fatigue  [] Patient limited by pain    [] Patient limited by other medical complications  [] Other:       PLAN: See eval  [x] Continue per plan of care [] Alter current plan (see comments above)  [] Plan of care initiated [] Hold pending MD visit [] Discharge  Will continue added scapular mobilization and more scapular exercise. Added US today to decrease overall pain in rotator cuff. Therapeutic Exercise and NMR EXR  [x] (63286) Provided verbal/tactile cueing for activities related to strengthening, flexibility, endurance, ROM  for improvements in proximal strength and core control with self care, mobility, lifting and ambulation.  [] (19493) Provided verbal/tactile cueing for activities related to improving balance, coordination, kinesthetic sense, posture, motor skill, proprioception  to assist with core control in self care, mobility, lifting, and ambulation.      Therapeutic Activities and Gait:    [] (48860 or 24343) Provided verbal/tactile cueing for activities related to improving balance, coordination, kinesthetic sense, posture, motor skill, proprioception and motor activation to allow for proper function  with self care and ADLs  [] (35716) Provided training and instruction to the patient for proper core and proximal hip recruitment and positioning with ambulation re-education     Home Exercise Program:    [x] (77609) Reviewed/Progressed HEP activities related to strengthening, flexibility, endurance, ROM of core, proximal hip and LE for functional self-care, mobility, lifting and ambulation   [] (67770) Reviewed/Progressed HEP activities related to improving balance, coordination, kinesthetic sense, posture, motor skill, proprioception of core, proximal hip and LE for self care, mobility, lifting, and ambulation      Manual Treatments:  PROM / STM / Oscillations-Mobs:  G-I, II, III, IV (PA's, Inf., Post.)  [x] (93286) Provided manual therapy to mobilize proximal hip and LS spine soft tissue/joints for the purpose of modulating pain, promoting relaxation,  increasing ROM, reducing/eliminating soft tissue swelling/inflammation/restriction, improving soft tissue extensibility and allowing for proper ROM for normal function with self care, mobility, lifting and ambulation. []CRP:  Canalith Repositioning procedure for the assessment, treatment and education of BPPV    Modalities: US to right shoulder region      Charges:  Timed Code Treatment Minutes: 30   Total Treatment Minutes: 40     Medicare Cap total YTD:        [x]N/A  Workers Comp Time Stamp  (Per CPT and Total Treatment) [x]N/A   Time In:   Time Out:       [] EVAL    [] Dry Needling  [x] ZZ(69096)   x  2  [] EStim Unattended 84006  [] NMR (77406)  x     [] Estim Attended  57392  [] Manual (55530)  x     [] Mechanical Txn 77361  [] TA    x     [x] Ultrasound  [] Gait   x  [] Vaso  [] CRP    [] Ionto           [] Other:        Electronically signed by: Jennie Poe JP,[id number]      Note: If patient does not return for scheduled/ recommended follow up visits, this note will serve as a discharge from care along with most recent update on progress.

## 2022-08-18 ENCOUNTER — HOSPITAL ENCOUNTER (OUTPATIENT)
Dept: PHYSICAL THERAPY | Age: 52
Setting detail: THERAPIES SERIES
Discharge: HOME | End: 2022-08-18
Payer: OTHER GOVERNMENT

## 2022-08-18 LAB — ADRENOCORTICOTROPIC HORMONE: 17 PG/ML (ref 6–58)

## 2022-08-18 PROCEDURE — 97110 THERAPEUTIC EXERCISES: CPT

## 2022-08-18 PROCEDURE — 97035 APP MDLTY 1+ULTRASOUND EA 15: CPT

## 2022-08-18 PROCEDURE — 97140 MANUAL THERAPY 1/> REGIONS: CPT

## 2022-08-18 NOTE — FLOWSHEET NOTE
Physical Therapy Daily Treatment Note    [x]Daily Tx Note    []Progress Note    [] Discharge Summary         Date:  2022    Patient Name:  Milind Ko    :  1970  MRN: [id number]      Medical/Treatment Diagnosis Information:  Diagnosis: R07.89 Bilateral chest wall pain localized to anterior latissimus dorsi bilaterally  Treatment Diagnosis: Thoracic pain    Insurance/Certification information:  PT Insurance Information: GEHA- no authorization    Physician Information:  Referring Provider (secondary): Carmelo Liang MD    Plan of care sent to provider:      [x]Faxed   []Co-signature    (attempts: 1[x] 22 2 []3[])     Plan of care signed :  [x]  Yes  [] No  Signed 2022      Date of Patient follow up with Physician:     Is this a Progress Report:     []  Yes  [x]  No      If Yes:  Date Range for reporting period:  Beginning 2022  Ending 2022    Progress report will be due (10 Rx or 30 days whichever is less):       Recertification will be due (POC Duration  / 90 days whichever is less):     Visit # Insurance Allowable Auth Required      []  Yes [x]  No        Functional Scale:  FOTO    Date    2022  Score   65  Predicted Score: 71  Predicted Visits:  10    Latex Allergy:  [x]NO      []YES  Preferred Language for Healthcare:   [x]English       []other:    RESTRICTIONS/PRECAUTIONS:  previous PE in 2021. Pt reports that she has recently lost weight with 15 lbs in last 3-4 months. 2017 pt was Dx with right breast atypical ductal hyperplasia. SUBJECTIVE:  Pt reports that arm felt better, less stiffness. Pt reports that this week has been less intense on pain.      Pain level:  Right scapular region 2/10 and right shoulder 0/10; end 0/10 pain     Plan Moving Forward/ For next visit:   Manual techniques for rib dysfunction and asymmetrical alignment; manual scapula mobilization  Exercise as able for stretch and strengthening    OBJECTIVE: Observation: Increased stiffness with abduction on right with increased pain after coming down    Exercises/Interventions:     Exercises in bold performed in department today. Items not bolded are carried forward from prior visits for continuity of the record. Exercise/Equipment Resistance/Repetitions HEP Other comments     Towel roll supine with stretch of pec region  reviewed []    Barrel stretch    3 reps hold 10 seconds to stretch right []    Ceiling punches supine and figure 8   1x10 B hold [] Pt reports that she had increased pain since. inferior glide with towel roll   PRN []     Prone mid row and extension 1x10 right [] Attempted horizontal abduction with difficulty-held     Shoulder shrugs and shoulder blade squeezes   1x10 []    Adduction red theraband  1x10  []    Mid and low rows red theraband   1x10 [] Issued red theraband       []          []         []        []        []        []        []        []        []      UE bike  Forward 1 minute attempted backward with increase pain stopped. []      Therapeutic Exercise/Home Exercise Program:   18 minutes  PROM: right UE  See above  Access code [email or web address]    Therapeutic Activity:  0 minutes    Gait: 0 minutes    Neuromuscular Re-Education:  0 minutes      Canalith Repositioning Procedure:      Manual Therapy:  19 minutes  Assessment:   Assessed breast tissue still with noted scar tissue 2 areas as well as decreased tissue mobility in bilateral thoracic region distal to breast as well as right pec region. Pelvic region bilateral symmetrical  Mid thoracic symmetrical   Slight less elevation of left shoulder region and less rounded shoulders. Palpation moderate scapular tenderness on right. Manual treatment:   Tissue mobilization to right breast and axillary region Right arm skin tractioning and stretching. MET of mid thoracic region in sitting and MET for left rib torsion in sidelying.   Right discomfort with supine position with increase shoulder roundness R inferior glide and distraction R, performed posterior glide; PA to mid and upper thoracic in prone, gentle; gentle first rib mobilization. sidelying scapular mobs B, severe to moderate restriction on right scapula compared to right. Thoracic distraction seated  Gentle TPR in upper traps  Rhythmic stabilization     Modalities: 11 minutes including set up  US to right R/C region secondary to tenderness noted with palpation; 50% 1.5 w/cm2 1 mHz     ASSESSMENT:   Pt demonstrates increased irritation with right rotator cuff region. Goals:   GOALS    Short Term Goals:  3   weeks Long Term Goals:   6  weeks   1). Establish HEP-MET 1). Pt independent with HEP   2). Pain  3/10 or less at worst- MET today 2). Pain  1/10 or less at worst   3). Pt able to maintain arms above head with minimal to no noted difficulty. 3). Increase B flex and abd to 175 with no note discomfort- Progressing      4). 4). Pt is able to pull self up from pool with no noted difficulty. 5). 5). Pt demonstrates minimal to no noted tenderness with palpation. 6). 6). Overall Progression Towards Functional goals/ Treatment Progress Update:  [] Patient is progressing as expected towards functional goals listed. [x] Progression is slowed due to complexities/Impairments listed. [] Progression has been slowed due to co-morbidities.   [] Plan just implemented, too soon to assess goals progression <30days   [] Goals require adjustment due to lack of progress  [] Patient is not progressing as expected and requires additional follow up with physician  [] Other    Prognosis for POC: [x] Good [] Fair  [] Poor    Patient requires continued skilled intervention: [x] Yes  [] No    Treatment/Activity Tolerance:  [x] Patient able to complete treatment  [] Patient limited by fatigue  [] Patient limited by pain    [] Patient limited by other medical complications  [] Other:       PLAN: See eval  [x] Continue per plan of care [] Alter current plan (see comments above)  [] Plan of care initiated [] Hold pending MD visit [] Discharge  Will continue added scapular mobilization and more scapular exercise. US today to decrease overall pain in rotator cuff tenderness. Therapeutic Exercise and NMR EXR  [x] (50695) Provided verbal/tactile cueing for activities related to strengthening, flexibility, endurance, ROM  for improvements in proximal strength and core control with self care, mobility, lifting and ambulation.  [] (17965) Provided verbal/tactile cueing for activities related to improving balance, coordination, kinesthetic sense, posture, motor skill, proprioception  to assist with core control in self care, mobility, lifting, and ambulation.      Therapeutic Activities and Gait:    [] (63779 or 80442) Provided verbal/tactile cueing for activities related to improving balance, coordination, kinesthetic sense, posture, motor skill, proprioception and motor activation to allow for proper function  with self care and ADLs  [] (73685) Provided training and instruction to the patient for proper core and proximal hip recruitment and positioning with ambulation re-education     Home Exercise Program:    [x] (41942) Reviewed/Progressed HEP activities related to strengthening, flexibility, endurance, ROM of core, proximal hip and LE for functional self-care, mobility, lifting and ambulation   [] (09007) Reviewed/Progressed HEP activities related to improving balance, coordination, kinesthetic sense, posture, motor skill, proprioception of core, proximal hip and LE for self care, mobility, lifting, and ambulation      Manual Treatments:  PROM / STM / Oscillations-Mobs:  G-I, II, III, IV (PA's, Inf., Post.)  [x] (38981) Provided manual therapy to mobilize proximal hip and LS spine soft tissue/joints for the purpose of modulating pain, promoting relaxation,  increasing ROM, reducing/eliminating soft tissue swelling/inflammation/restriction, improving soft tissue extensibility and allowing for proper ROM for normal function with self care, mobility, lifting and ambulation. []CRP:  Canalith Repositioning procedure for the assessment, treatment and education of BPPV    Modalities: US to right shoulder region      Charges:  Timed Code Treatment Minutes: 30   Total Treatment Minutes:      Medicare Cap total YTD:        [x]N/A  Workers Comp Time Stamp  (Per CPT and Total Treatment) [x]N/A   Time In:   Time Out:       [] EVAL    [] Dry Needling  [x] ZA(03311)   x  1  [] EStim Unattended 98451  [] NMR (70752)  x     [] Estim Attended  96520  [x] Manual (06408)  x  1   [] Mechanical Txn 66321  [] TA    x     [x] Ultrasound  [] Gait   x  [] Vaso  [] CRP    [] Ionto           [] Other:        Electronically signed by: MARY Lopez,[id number]      Note: If patient does not return for scheduled/ recommended follow up visits, this note will serve as a discharge from care along with most recent update on progress.

## 2022-08-23 ENCOUNTER — PATIENT MESSAGE (OUTPATIENT)
Dept: FAMILY MEDICINE CLINIC | Age: 52
End: 2022-08-23

## 2022-08-23 ENCOUNTER — HOSPITAL ENCOUNTER (OUTPATIENT)
Dept: PHYSICAL THERAPY | Age: 52
Setting detail: THERAPIES SERIES
Discharge: HOME | End: 2022-08-23
Payer: OTHER GOVERNMENT

## 2022-08-23 DIAGNOSIS — R53.83 FATIGUE, UNSPECIFIED TYPE: ICD-10-CM

## 2022-08-23 DIAGNOSIS — Q89.1 ADRENAL GLAND ANOMALY: ICD-10-CM

## 2022-08-23 DIAGNOSIS — Z86.711 HISTORY OF PULMONARY EMBOLISM: ICD-10-CM

## 2022-08-23 DIAGNOSIS — R63.4 UNEXPLAINED WEIGHT LOSS: Primary | ICD-10-CM

## 2022-08-23 PROCEDURE — 97110 THERAPEUTIC EXERCISES: CPT

## 2022-08-23 PROCEDURE — 97035 APP MDLTY 1+ULTRASOUND EA 15: CPT

## 2022-08-23 NOTE — TELEPHONE ENCOUNTER
I saw Jenna Plascencia for similar symptoms last month and I ordered a complete work up. With her h/o night sweats, unexplained weight loss, adrenal gland anomaly, fatigue and frequent PE's, I [facility] for further evaluation. I will place the urgent referral order. Please help pt to schedule. I will also order a D-dimer, let her know that I will call her with results. [facility]. Thank you.

## 2022-08-23 NOTE — FLOWSHEET NOTE
[facility]  [address] [place] [address]  DEEPIKA Childs 89  Phone: [phone]      Fax:   [phone]    The following patient was re-assessed, for physical therapy services at [address] to continue. A summary of our findings can be found below in the re-assessment below. This includes our plan of care. If you have any questions or concerns regarding these findings, please do not hesitate to contact me at the office phone number above.   Thank you for this referral.    Recommendations:_____________________________________________    Signature:_____________________________________________________     Physical Therapy Daily Treatment Note    [x]Daily Tx Note    [x]Progress Note    [] Discharge Summary         Date:  2022    Patient Name:  Corby Knight    :  1970  MRN: [id number]      Medical/Treatment Diagnosis Information:  Diagnosis: R07.89 Bilateral chest wall pain localized to anterior latissimus dorsi bilaterally  Treatment Diagnosis: Thoracic pain    Insurance/Certification information:  PT Insurance Information: GEHA- no authorization    Physician Information:  Referring Provider (secondary): Alfredo Martinez MD    Plan of care sent to provider:      [x]Faxed   []Co-signature    (attempts: 1[x] 22 2 []3[])     Plan of care signed :  [x]  Yes  [] No  Signed 2022      Date of Patient follow up with Physician:     Is this a Progress Report:     []  Yes  [x]  No      If Yes:  Date Range for reporting period:  Beginning 2022  Ending 2022    Progress report will be due (10 Rx or 30 days whichever is less):  3/28/0975     Recertification will be due (POC Duration  / 90 days whichever is less):     Visit # Insurance Allowable Auth Required      []  Yes [x]  No        Functional Scale:  FOTO    Date    2022  Score   65  Predicted Score: 71  Predicted Visits: 10    Latex Allergy:  [x]NO      []YES  Preferred Language for Healthcare:   [x]English       []other:    RESTRICTIONS/PRECAUTIONS:  previous PE in 4/2021. Pt reports that she has recently lost weight with 15 lbs in last 3-4 months. 12/2017 pt was Dx with right breast atypical ductal hyperplasia. SUBJECTIVE:  Pt has been feeling better between shoulder blades and shoulder but having difficulty over weekend in lung region/kidney with flank discomfort, weekend 5/10 and today 1/10. Pt reports that she has been having night sweats. Pt reports that she was having problems with deep breaths on Sunday and limited expansion and pain in left lung region. Pt reports that shoulder is less restricted. Pt reports that she has had some head congestion when out of town. Pain level:  Lower flank pain today more on left side and feels internal; Right scapular region 2/10 and right shoulder 0/10    Plan Moving Forward/ For next visit:   Manual techniques for rib dysfunction and asymmetrical alignment; manual scapula mobilization  Exercise as able for stretch and strengthening    OBJECTIVE:   Vitals secondary to complaint of pain with breathing: Sat O2 98%, pulse 64, /55    Exercises/Interventions:     Exercises in bold performed in department today. Items not bolded are carried forward from prior visits for continuity of the record. Exercise/Equipment Resistance/Repetitions HEP Other comments     Towel roll supine with stretch of pec region  reviewed []    Barrel stretch    3 reps hold 10 seconds to stretch right []    Ceiling punches supine and figure 8   1x10 B hold [] Pt reports that she had increased pain since.       inferior glide with towel roll   PRN []     Prone mid row and extension 1x10 bilateral []      Shoulder shrugs and shoulder blade squeezes   1x10 []    Adduction red theraband  1x10  []    Mid and low rows red theraband   1x10 []    Diaphragmatic  breathing 1x10 [] Issued handout         [] []        []        []        []        []        []        []      UE bike  Forward 1 minute attempted backward with increase pain stopped. []      Therapeutic Exercise/Home Exercise Program:   30 minutes  PROM: right UE  See above  Access code [email or web address]  From: 8/16/2022:AROM: flexion right 162, left 162; abduction right 130 with pain, left 169; ER  T2, IR left scapular angle right mid thoracic  Strength: flexion 4/5 bilateral and ER 4/5 right    Therapeutic Activity:  0 minutes    Gait: 0 minutes    Neuromuscular Re-Education:  0 minutes      Canalith Repositioning Procedure:      Manual Therapy:  0 minutes  Assessment:   Assessed breast tissue still with noted scar tissue 2 areas as well as decreased tissue mobility in bilateral thoracic region distal to breast as well as right pec region. Pelvic region bilateral symmetrical  Mid thoracic symmetrical   Slight less elevation of left shoulder region and less rounded shoulders. Palpation moderate scapular tenderness on right. Manual treatment:   Tissue mobilization to right breast and axillary region Right arm skin tractioning and stretching. MET of mid thoracic region in sitting and MET for left rib torsion in sidelying. Right discomfort with supine position with increase shoulder roundness R inferior glide and distraction R, performed posterior glide; PA to mid and upper thoracic in prone, gentle; gentle first rib mobilization. sidelying scapular mobs B, severe to moderate restriction on right scapula compared to right. Thoracic distraction seated  Gentle TPR in upper traps  Rhythmic stabilization     Modalities:  13 minutes including set up  US to right R/C region secondary to tenderness noted with palpation; 50% 1.5 w/cm2 1 mHz     ASSESSMENT:   Pt demonstrates improvement with scapular strengthening and less scapular pain. Pt now having pain distal to scapula with breathing. Instructed pt to follow up with PCP regarding pain with breathing. Goals:   GOALS    Short Term Goals:  3   weeks Long Term Goals:   6  weeks   1). Establish HEP-MET 1). Pt independent with HEP   2). Pain  3/10 or less at worst- MET today 2). Pain  1/10 or less at worst   3). Pt able to maintain arms above head with minimal to no noted difficulty. 3). Increase B flex and abd to 175 with no note discomfort- Progressing      4). 4). Pt is able to pull self up from pool with no noted difficulty. 5). 5). Pt demonstrates minimal to no noted tenderness with palpation. 6). 6). Overall Progression Towards Functional goals/ Treatment Progress Update:  [] Patient is progressing as expected towards functional goals listed. [x] Progression is slowed due to complexities/Impairments listed. [] Progression has been slowed due to co-morbidities. [] Plan just implemented, too soon to assess goals progression <30days   [] Goals require adjustment due to lack of progress  [] Patient is not progressing as expected and requires additional follow up with physician  [] Other    Prognosis for POC: [x] Good [] Fair  [] Poor    Patient requires continued skilled intervention: [x] Yes  [] No    Treatment/Activity Tolerance:  [x] Patient able to complete treatment  [] Patient limited by fatigue  [] Patient limited by pain    [] Patient limited by other medical complications  [] Other:       PLAN: See eval  [x] Continue per plan of care [] Alter current plan (see comments above)  [] Plan of care initiated [] Hold pending MD visit [] Discharge  Recommend to continue 2 times per week for 3 more weeks. Treatment to include scapular mobilization, scapular strengthening exercise, US to right rotator cuff. Tissue mobilization as need and ROM UE.        Therapeutic Exercise and NMR EXR  [x] (66777) Provided verbal/tactile cueing for activities related to strengthening, flexibility, endurance, ROM  for improvements in proximal strength and core control with self care, mobility, lifting and ambulation.  [] (77465) Provided verbal/tactile cueing for activities related to improving balance, coordination, kinesthetic sense, posture, motor skill, proprioception  to assist with core control in self care, mobility, lifting, and ambulation. Therapeutic Activities and Gait:    [] (36001 or 67749) Provided verbal/tactile cueing for activities related to improving balance, coordination, kinesthetic sense, posture, motor skill, proprioception and motor activation to allow for proper function  with self care and ADLs  [] (48402) Provided training and instruction to the patient for proper core and proximal hip recruitment and positioning with ambulation re-education     Home Exercise Program:    [x] (51541) Reviewed/Progressed HEP activities related to strengthening, flexibility, endurance, ROM of core, proximal hip and LE for functional self-care, mobility, lifting and ambulation   [] (46157) Reviewed/Progressed HEP activities related to improving balance, coordination, kinesthetic sense, posture, motor skill, proprioception of core, proximal hip and LE for self care, mobility, lifting, and ambulation      Manual Treatments:  PROM / STM / Oscillations-Mobs:  G-I, II, III, IV (PA's, Inf., Post.)  [x] (58566) Provided manual therapy to mobilize proximal hip and LS spine soft tissue/joints for the purpose of modulating pain, promoting relaxation,  increasing ROM, reducing/eliminating soft tissue swelling/inflammation/restriction, improving soft tissue extensibility and allowing for proper ROM for normal function with self care, mobility, lifting and ambulation.      []CRP:  Canalith Repositioning procedure for the assessment, treatment and education of BPPV    Modalities: US to right shoulder region      Charges:  Timed Code Treatment Minutes: 43   Total Treatment Minutes: 43     Medicare Cap total YTD:        [x]N/A  Workers Comp Time Stamp  (Per CPT and Total Treatment) [x]N/A   Time In:   Time Out:       [] EVAL    [] Dry Needling  [x] AK(95129)   x  2  [] EStim Unattended 16545  [] NMR (21175)  x     [] Estim Attended  47598  [] Manual (79623)  x    [] Mechanical Txn 29351  [] TA    x     [x] Ultrasound  [] Gait   x  [] Vaso  [] CRP    [] Ionto           [] Other:        Electronically signed by: LEONIDAS Meade,[id number]      Note: If patient does not return for scheduled/ recommended follow up visits, this note will serve as a discharge from care along with most recent update on progress.

## 2022-08-23 NOTE — TELEPHONE ENCOUNTER
From: Reyes Leopard  To: Henri Car  Sent: 8/23/2022 1:42 PM EDT  Subject: Symptoms    Hi Yony De León and team, [facility] all is well. On Sunday I started experiencing lower flank discomfort, especially when I would take deep breaths. The discomfort lessened into Monday but is intermittent. Additionally, I have been experiencing severe night sweats the last 3 nights (so much I have to change my shirt). I went to PT today and shared this information and she encouraged me to reach out. PT checked my oxygen which was 98, good, but my BP was a little low 118/55 and my weight was down 2 pounds at 142 (but it was early so maybe a early reading). Also, very tired. Would it be beneficial to be checked or D-Dimer? I did have some congestion two weeks ago so maybe it is residual from that and needs to run its course. Eugenia Nava  Appreciate your guidance, Yony De León

## 2022-08-24 DIAGNOSIS — Z86.711 HISTORY OF PULMONARY EMBOLISM: ICD-10-CM

## 2022-08-24 LAB — D DIMER: <0.27 UG/ML FEU (ref 0–0.6)

## 2022-08-25 ENCOUNTER — HOSPITAL ENCOUNTER (OUTPATIENT)
Dept: PHYSICAL THERAPY | Age: 52
Setting detail: THERAPIES SERIES
Discharge: HOME | End: 2022-08-25
Payer: OTHER GOVERNMENT

## 2022-08-25 PROCEDURE — 97140 MANUAL THERAPY 1/> REGIONS: CPT

## 2022-08-25 PROCEDURE — 97110 THERAPEUTIC EXERCISES: CPT

## 2022-08-25 PROCEDURE — 97035 APP MDLTY 1+ULTRASOUND EA 15: CPT

## 2022-08-25 NOTE — FLOWSHEET NOTE
Physical Therapy Daily Treatment Note    [x]Daily Tx Note    []Progress Note    [] Discharge Summary         Date:  2022    Patient Name:  Nat Rubinstein    :  1970  MRN: [id number]      Medical/Treatment Diagnosis Information:  Diagnosis: R07.89 Bilateral chest wall pain localized to anterior latissimus dorsi bilaterally  Treatment Diagnosis: Thoracic pain    Insurance/Certification information:  PT Insurance Information: [employer]- no authorization    Physician Information:  Referring Provider (secondary): Key Ibrahim MD    Plan of care sent to provider:      [x]Faxed   []Co-signature    (attempts: 1[x] 22 2 []3[])     Plan of care signed :  [x]  Yes  [] No  Signed 2022      Date of Patient follow up with Physician:     Is this a Progress Report:     []  Yes  [x]  No      If Yes:  Date Range for reporting period:  Beginning 2022  Ending 2022    Progress report will be due (10 Rx or 30 days whichever is less):       Recertification will be due (POC Duration  / 90 days whichever is less):     Visit # Insurance Allowable Auth Required     []  Yes [x]  No        Functional Scale:  FOTO    Date    2022  Score   65  Predicted Score: 71  Predicted Visits:  10    Latex Allergy:  [x]NO      []YES  Preferred Language for Healthcare:   [x]English       []other:    RESTRICTIONS/PRECAUTIONS:  previous PE in 2021. Pt reports that she has recently lost weight with 15 lbs in last 3-4 months. 2017 pt was Dx with right breast atypical ductal hyperplasia. SUBJECTIVE:  Pt reports that she had a bad night last night with right shoulder 5-6/10. Pt reports that she had right hip pain as well.     Pain level:  Lower flank pain today more on left side and feels internal; Right scapular region 2/10 and right shoulder 0/10    Plan Moving Forward/ For next visit:   Manual techniques for rib dysfunction and asymmetrical alignment; manual scapula mobilization  Exercise as able for stretch and strengthening    OBJECTIVE:   Vitals secondary to complaint of pain with breathing: Sat O2 98%, pulse 64, /55    Exercises/Interventions:     Exercises in bold performed in department today. Items not bolded are carried forward from prior visits for continuity of the record. Exercise/Equipment Resistance/Repetitions HEP Other comments     Towel roll supine with stretch of pec region  reviewed []    Barrel stretch    3 reps hold 10 seconds to stretch right []    Ceiling punches supine and figure 8   1x10 B hold [] Pt reports that she had increased pain since. inferior glide with towel roll   PRN []     Prone mid row and extension 1x10 bilateral []      Shoulder shrugs and shoulder blade squeezes   1x10 []    Adduction red theraband  1x10  []    Mid and low rows red theraband   1x10 []    Diaphragmatic  breathing 1x10 [] Issued handout   Theraband ER red     1x10 with towel roll and going from stomach to side of body. []     Added glut sets and bridges secondary to posterior innominate on right    1x10 glut sets hold 5 seconds   1x10 bridges hold 2-3 secs [] Pt was complaining of pain in hip region last evening       []        []        []        []        []        []      UE bike  Forward 1 minute attempted backward with increase pain stopped. []      Therapeutic Exercise/Home Exercise Program:   20 minutes  PROM: right UE  See above  Access code [email or web address]  Discussed pt's workout for UE and instructed pt to hold ER weighted exercise. Therapeutic Activity:  0 minutes    Gait: 0 minutes    Neuromuscular Re-Education:  0 minutes      Canalith Repositioning Procedure:      Manual Therapy:  17 minutes  Assessment:   Assessed breast tissue still with noted scar tissue 2 areas as well as decreased tissue mobility in bilateral thoracic region distal to breast as well as right pec region.    Pelvic region posterior innominate on right compared to left  Mid thoracic symmetrical   Slight less elevation of left shoulder region and less rounded shoulders. Palpation moderate scapular tenderness on right. Manual treatment:   Tissue mobilization to right breast and axillary region Right arm skin tractioning and stretching. Scar massage  MET of mid thoracic region in sitting and MET for left rib torsion in sidelying. Right discomfort with supine position with increase shoulder roundness R inferior glide and distraction R, performed posterior glide; PA to mid and upper thoracic in prone, gentle; gentle first rib mobilization. sidelying scapular mobs B, severe to moderate restriction on right scapula compared to right. Thoracic distraction seated  Gentle TPR in upper traps  Rhythmic stabilization   Pelvis clearing and sidelying MET for posterior innominate on right     Modalities:  15 minutes including set up  US to right R/C region secondary to tenderness noted with palpation; 50% 1.5 w/cm2 1 mHz     ASSESSMENT:   Pt demonstrates improvement with scapular strengthening and less scapular pain. Pt now having pain distal to scapula with breathing. Instructed pt to follow up with PCP regarding pain with breathing. Goals:   GOALS    Short Term Goals:  3   weeks Long Term Goals:   6  weeks   1). Establish HEP-MET 1). Pt independent with HEP   2). Pain  3/10 or less at worst- MET today 2). Pain  1/10 or less at worst   3). Pt able to maintain arms above head with minimal to no noted difficulty. 3). Increase B flex and abd to 175 with no note discomfort- Progressing      4). 4). Pt is able to pull self up from pool with no noted difficulty. 5). 5). Pt demonstrates minimal to no noted tenderness with palpation. 6). 6). Overall Progression Towards Functional goals/ Treatment Progress Update:  [] Patient is progressing as expected towards functional goals listed. [x] Progression is slowed due to complexities/Impairments listed.   [] Progression has been slowed due to co-morbidities. [] Plan just implemented, too soon to assess goals progression <30days   [] Goals require adjustment due to lack of progress  [] Patient is not progressing as expected and requires additional follow up with physician  [] Other    Prognosis for POC: [x] Good [] Fair  [] Poor    Patient requires continued skilled intervention: [x] Yes  [] No    Treatment/Activity Tolerance:  [x] Patient able to complete treatment  [] Patient limited by fatigue  [] Patient limited by pain    [] Patient limited by other medical complications  [] Other:       PLAN: See eval  [x] Continue per plan of care [] Alter current plan (see comments above)  [] Plan of care initiated [] Hold pending MD visit [] Discharge  Recommend to continue 2 times per week for 3 more weeks. Treatment to include scapular mobilization, scapular strengthening exercise, US to right rotator cuff. Tissue mobilization as need and ROM UE. Therapeutic Exercise and NMR EXR  [x] (89173) Provided verbal/tactile cueing for activities related to strengthening, flexibility, endurance, ROM  for improvements in proximal strength and core control with self care, mobility, lifting and ambulation.  [] (82338) Provided verbal/tactile cueing for activities related to improving balance, coordination, kinesthetic sense, posture, motor skill, proprioception  to assist with core control in self care, mobility, lifting, and ambulation.      Therapeutic Activities and Gait:    [] (49385 or 34595) Provided verbal/tactile cueing for activities related to improving balance, coordination, kinesthetic sense, posture, motor skill, proprioception and motor activation to allow for proper function  with self care and ADLs  [] (30705) Provided training and instruction to the patient for proper core and proximal hip recruitment and positioning with ambulation re-education     Home Exercise Program:    [x] (41446) Reviewed/Progressed HEP activities related to strengthening, flexibility, endurance, ROM of core, proximal hip and LE for functional self-care, mobility, lifting and ambulation   [] (32745) Reviewed/Progressed HEP activities related to improving balance, coordination, kinesthetic sense, posture, motor skill, proprioception of core, proximal hip and LE for self care, mobility, lifting, and ambulation      Manual Treatments:  PROM / STM / Oscillations-Mobs:  G-I, II, III, IV (PA's, Inf., Post.)  [x] (22264) Provided manual therapy to mobilize proximal hip and LS spine soft tissue/joints for the purpose of modulating pain, promoting relaxation,  increasing ROM, reducing/eliminating soft tissue swelling/inflammation/restriction, improving soft tissue extensibility and allowing for proper ROM for normal function with self care, mobility, lifting and ambulation. []CRP:  Canalith Repositioning procedure for the assessment, treatment and education of BPPV    Modalities: US to right shoulder region      Charges:  Timed Code Treatment Minutes: 52   Total Treatment Minutes: 52     Medicare Cap total YTD:        [x]N/A  Workers Comp Time Stamp  (Per CPT and Total Treatment) [x]N/A   Time In:   Time Out:       [] EVAL    [] Dry Needling  [x] ZH(48220)   x  1  [] EStim Unattended 00069  [] NMR (30466)  x     [] Estim Attended  40445  [x] Manual (27650)  x  1  [] Mechanical Txn 86486  [] TA    x     [x] Ultrasound  [] Gait   x  [] Vaso  [] CRP    [] Ionto           [] Other:        Electronically signed by: LEILA Lester,[id number]      Note: If patient does not return for scheduled/ recommended follow up visits, this note will serve as a discharge from care along with most recent update on progress.

## 2022-08-30 ENCOUNTER — HOSPITAL ENCOUNTER (OUTPATIENT)
Dept: PHYSICAL THERAPY | Age: 52
Setting detail: THERAPIES SERIES
Discharge: HOME | End: 2022-08-30
Payer: OTHER GOVERNMENT

## 2022-08-30 PROCEDURE — 97110 THERAPEUTIC EXERCISES: CPT

## 2022-08-30 PROCEDURE — 97140 MANUAL THERAPY 1/> REGIONS: CPT

## 2022-08-30 PROCEDURE — 97035 APP MDLTY 1+ULTRASOUND EA 15: CPT

## 2022-08-30 NOTE — FLOWSHEET NOTE
Physical Therapy Daily Treatment Note    [x]Daily Tx Note    []Progress Note    [] Discharge Summary         Date:  2022    Patient Name:  Armida Marie    :  1970  MRN: [id number]      Medical/Treatment Diagnosis Information:  Diagnosis: R07.89 Bilateral chest wall pain localized to anterior latissimus dorsi bilaterally  Treatment Diagnosis: Thoracic pain    Insurance/Certification information:  PT Insurance Information: [employer]- no authorization    Physician Information:  Referring Provider (secondary): Gus Mccartney MD    Plan of care sent to provider:      [x]Faxed   []Co-signature    (attempts: 1[x] 22 2 []3[])     Plan of care signed :  [x]  Yes  [] No  Signed 2022      Date of Patient follow up with Physician:     Is this a Progress Report:     []  Yes  [x]  No      If Yes:  Date Range for reporting period:  Beginning 2022  Ending 2022    Progress report will be due (10 Rx or 30 days whichever is less):       Recertification will be due (POC Duration  / 90 days whichever is less):     Visit # Insurance Allowable Auth Required     []  Yes [x]  No        Functional Scale:  FOTO    Date    2022  Score   65  Predicted Score: 71  Predicted Visits:  10    Latex Allergy:  [x]NO      []YES  Preferred Language for Healthcare:   [x]English       []other:    RESTRICTIONS/PRECAUTIONS:  previous PE in 2021. Pt reports that she has recently lost weight with 15 lbs in last 3-4 months. 2017 pt was Dx with right breast atypical ductal hyperplasia. SUBJECTIVE:  Pt reports that she is having pain 4-5/10 after exercise. Pt reports that she still feels that it is internal with some discomfort with rib expansion.       Pain level:  Right shoulder and flank region 1-2/10     Plan Moving Forward/ For next visit:   Manual techniques for rib dysfunction and asymmetrical alignment; manual scapula mobilization  Exercise as able for stretch and strengthening    OBJECTIVE:     Exercises/Interventions:     Exercises in bold performed in department today. Items not bolded are carried forward from prior visits for continuity of the record. Exercise/Equipment Resistance/Repetitions HEP Other comments     Towel roll supine with stretch of pec region  reviewed []    Barrel stretch    3 reps hold 10 seconds to stretch right []    Ceiling punches supine and figure 8   1x10 B hold [] Pt reports that she had increased pain since. inferior glide with towel roll   PRN []     Prone mid row and extension 1x10 bilateral []      Shoulder shrugs and shoulder blade squeezes   1x10 []    Adduction red theraband  1x10  []    Mid and low rows red theraband   1x10 []    Diaphragmatic  breathing 1x10 [] Issued handout   Theraband ER red     1x10 with towel roll and going from stomach to side of body. [] Hold secondary to increase discomfort. Added glut sets and bridges secondary to posterior innominate on right    1x10 glut sets hold 5 seconds   1x10 bridges hold 2-3 secs [] Pt was complaining of pain in hip region last evening       []        []        []        []        []        []      UE bike  Forward 1 minute attempted backward with increase pain stopped. []      Therapeutic Exercise/Home Exercise Program:   20 minutes  PROM: right UE  See above  Access code [email or web address]  Discussed pt's workout for UE and instructed pt to hold ER weighted exercise. PROM: flex R 176, Abd R 178, ER R 50 @ 90 degrees abduction, IR R 80 @ 85 degrees abduction- required contract/relax prior to. Therapeutic Activity:  0 minutes    Gait: 0 minutes    Neuromuscular Re-Education:  0 minutes      Canalith Repositioning Procedure:      Manual Therapy:  15 minutes  Assessment:   Assessed breast tissue still with noted scar tissue 2 areas as well as decreased tissue mobility in bilateral thoracic region distal to breast as well as right pec region.    Pelvic region posterior innominate on right compared to left  Mid thoracic symmetrical   Slight less elevation of left shoulder region and less rounded shoulders. Palpation moderate scapular tenderness on right. Manual treatment:   Tissue mobilization to right breast and axillary region Right arm skin tractioning and stretching. Scar massage  MET of mid thoracic region in sitting and MET for left rib torsion in sidelying. Right discomfort with supine position with increase shoulder roundness R inferior glide and distraction R, performed posterior glide; PA to mid and upper thoracic in prone, gentle; gentle first rib mobilization. sidelying scapular mobs B, severe to moderate restriction on right scapula compared to right. Thoracic distraction seated  Gentle TPR in upper traps  Rhythmic stabilization   Pelvis clearing and sidelying MET for posterior innominate on right     Modalities:  10 minutes including set up  US to right R/C region secondary to tenderness noted with palpation; 50% 1.5 w/cm2 1 mHz     ASSESSMENT:   Pt demonstrates improvement with scapular strengthening and less scapular pain. Pt now having pain distal to scapula with breathing. Instructed pt to follow up with PCP regarding pain with breathing. Goals:   GOALS    Short Term Goals:  3   weeks Long Term Goals:   6  weeks   1). Establish HEP-MET 1). Pt independent with HEP   2). Pain  3/10 or less at worst- MET today 2). Pain  1/10 or less at worst   3). Pt able to maintain arms above head with minimal to no noted difficulty. 3). Increase B flex and abd to 175 with no note discomfort- Progressing      4). 4). Pt is able to pull self up from pool with no noted difficulty. 5). 5). Pt demonstrates minimal to no noted tenderness with palpation. 6). 6). Overall Progression Towards Functional goals/ Treatment Progress Update:  [] Patient is progressing as expected towards functional goals listed. [x] Progression is slowed due to complexities/Impairments listed.   [] Progression has been slowed due to co-morbidities. [] Plan just implemented, too soon to assess goals progression <30days   [] Goals require adjustment due to lack of progress  [] Patient is not progressing as expected and requires additional follow up with physician  [] Other    Prognosis for POC: [x] Good [] Fair  [] Poor    Patient requires continued skilled intervention: [x] Yes  [] No    Treatment/Activity Tolerance:  [x] Patient able to complete treatment  [] Patient limited by fatigue  [] Patient limited by pain    [] Patient limited by other medical complications  [] Other:       PLAN: See eval  [x] Continue per plan of care [] Alter current plan (see comments above)  [] Plan of care initiated [] Hold pending MD visit [] Discharge  Recommend to continue 2 times per week for 3 more weeks. Treatment to include scapular mobilization, scapular strengthening exercise, US to right rotator cuff. Tissue mobilization as need and ROM UE. Therapeutic Exercise and NMR EXR  [x] (09004) Provided verbal/tactile cueing for activities related to strengthening, flexibility, endurance, ROM  for improvements in proximal strength and core control with self care, mobility, lifting and ambulation.  [] (70969) Provided verbal/tactile cueing for activities related to improving balance, coordination, kinesthetic sense, posture, motor skill, proprioception  to assist with core control in self care, mobility, lifting, and ambulation.      Therapeutic Activities and Gait:    [] (11533 or 12890) Provided verbal/tactile cueing for activities related to improving balance, coordination, kinesthetic sense, posture, motor skill, proprioception and motor activation to allow for proper function  with self care and ADLs  [] (18421) Provided training and instruction to the patient for proper core and proximal hip recruitment and positioning with ambulation re-education     Home Exercise Program:    [x] (08418) Reviewed/Progressed HEP activities related to strengthening, flexibility, endurance, ROM of core, proximal hip and LE for functional self-care, mobility, lifting and ambulation   [] (04626) Reviewed/Progressed HEP activities related to improving balance, coordination, kinesthetic sense, posture, motor skill, proprioception of core, proximal hip and LE for self care, mobility, lifting, and ambulation      Manual Treatments:  PROM / STM / Oscillations-Mobs:  G-I, II, III, IV (PA's, Inf., Post.)  [x] (82942) Provided manual therapy to mobilize proximal hip and LS spine soft tissue/joints for the purpose of modulating pain, promoting relaxation,  increasing ROM, reducing/eliminating soft tissue swelling/inflammation/restriction, improving soft tissue extensibility and allowing for proper ROM for normal function with self care, mobility, lifting and ambulation. []CRP:  Canalith Repositioning procedure for the assessment, treatment and education of BPPV    Modalities: US to right shoulder region      Charges:  Timed Code Treatment Minutes: 45   Total Treatment Minutes: 45     Medicare Cap total YTD:        [x]N/A  Workers Comp Time Stamp  (Per CPT and Total Treatment) [x]N/A   Time In:   Time Out:       [] EVAL    [] Dry Needling  [x] RW(61516)   x  1  [] EStim Unattended 21755  [] NMR (83198)  x     [] Estim Attended  63503  [x] Manual (28741)  x  1  [] Mechanical Txn 09466  [] TA    x     [x] Ultrasound  [] Gait   x  [] Vaso  [] CRP    [] Ionto           [] Other:        Electronically signed by: MARTA Gaffeny,[id number]      Note: If patient does not return for scheduled/ recommended follow up visits, this note will serve as a discharge from care along with most recent update on progress.

## 2022-09-01 ENCOUNTER — HOSPITAL ENCOUNTER (OUTPATIENT)
Dept: PHYSICAL THERAPY | Age: 52
Setting detail: THERAPIES SERIES
Discharge: HOME | End: 2022-09-01
Payer: OTHER GOVERNMENT

## 2022-09-01 PROCEDURE — 97110 THERAPEUTIC EXERCISES: CPT

## 2022-09-01 PROCEDURE — 97140 MANUAL THERAPY 1/> REGIONS: CPT

## 2022-09-01 PROCEDURE — 97035 APP MDLTY 1+ULTRASOUND EA 15: CPT

## 2022-09-01 NOTE — FLOWSHEET NOTE
Physical Therapy Daily Treatment Note    [x]Daily Tx Note    []Progress Note    [] Discharge Summary         Date:  2022    Patient Name:  Cristi Malcolm    :  1970  MRN: [id number]      Medical/Treatment Diagnosis Information:  Diagnosis: R07.89 Bilateral chest wall pain localized to anterior latissimus dorsi bilaterally  Treatment Diagnosis: Thoracic pain    Insurance/Certification information:  PT Insurance Information: [employer]- no authorization    Physician Information:  Referring Provider (secondary): Librado Velázquez MD    Plan of care sent to provider:      [x]Faxed   []Co-signature    (attempts: 1[x] 22 2 []3[])     Plan of care signed :  [x]  Yes  [] No  Signed 2022      Date of Patient follow up with Physician:     Is this a Progress Report:     []  Yes  [x]  No      If Yes:  Date Range for reporting period:  Beginning 2022  Ending 2022    Progress report will be due (10 Rx or 30 days whichever is less):       Recertification will be due (POC Duration  / 90 days whichever is less):     Visit # Insurance Allowable Auth Required     []  Yes [x]  No        Functional Scale:  FOTO    Date    2022  Score   65  Predicted Score: 71  Predicted Visits:  10    Latex Allergy:  [x]NO      []YES  Preferred Language for Healthcare:   [x]English       []other:    RESTRICTIONS/PRECAUTIONS:  previous PE in 2021. Pt reports that she has recently lost weight with 15 lbs in last 3-4 months. 2017 pt was Dx with right breast atypical ductal hyperplasia. SUBJECTIVE:  Pt reports that she is still having the discomfort in the rib region and feels that it is internal. She states that it seems to go flank into shoulder.     Pain level:  Right shoulder and flank region 2-3/10     Plan Moving Forward/ For next visit:   Manual techniques for rib dysfunction and asymmetrical alignment; manual scapula mobilization  Exercise as able for stretch and strengthening    OBJECTIVE:     Exercises/Interventions:     Exercises in bold performed in department today. Items not bolded are carried forward from prior visits for continuity of the record. Exercise/Equipment Resistance/Repetitions HEP Other comments     Towel roll supine with stretch of pec region  reviewed []    Barrel stretch    3 reps hold 10 seconds to stretch right []    Ceiling punches supine and figure 8   1x10 B hold [] Pt reports that she had increased pain since. inferior glide with towel roll   PRN []     Prone mid row and extension 1x10 bilateral []      Shoulder shrugs and shoulder blade squeezes   1x10 []    Adduction red theraband  1x10  []    Mid and low rows red theraband   1x10 []    Diaphragmatic  breathing 1x10 [] Issued handout   Theraband ER red     1x10 with towel roll and going from stomach to side of body. [] Hold secondary to increase discomfort. Added glut sets and bridges secondary to posterior innominate on right    1x10 glut sets hold 5 seconds   1x10 bridges hold 2-3 secs [] Pt was complaining of pain in hip region last evening       []        []        []        []        []        []      UE bike  Forward 1 minute attempted backward with increase pain stopped. []      Therapeutic Exercise/Home Exercise Program:  12  minutes  PROM: right UE  See above  Access code [email or web address]    Therapeutic Activity:  0 minutes    Gait: 0 minutes    Neuromuscular Re-Education:  0 minutes      Canalith Repositioning Procedure:      Manual Therapy:  20 minutes  Assessment:   Assessed breast tissue still with noted scar tissue 2 areas as well as decreased tissue mobility in bilateral thoracic region distal to breast as well as right pec region. Pelvic region posterior innominate on right compared to left  Mid thoracic symmetrical   Slight less elevation of left shoulder region and less rounded shoulders. Palpation moderate scapular tenderness on right.    Manual treatment:   Tissue mobilization to right breast and axillary region Right arm skin tractioning and stretching. Scar massage  MET of mid thoracic region in sitting and MET for left rib torsion in sidelying. Right discomfort with supine position with increase shoulder roundness R inferior glide and distraction R, performed posterior glide; PA to mid and upper thoracic in prone, gentle; gentle first rib mobilization. sidelying scapular mobs B, severe to moderate restriction on right scapula compared to right. Thoracic distraction seated  Gentle TPR in upper traps  Rhythmic stabilization   Pelvis clearing and sidelying MET for posterior innominate on right     Modalities:  9 minutes including set up  US to right R/C region secondary to tenderness noted with palpation; 50% 1.5 w/cm2 1 mHz     ASSESSMENT:   Pt demonstrates improvement with ROM. Goals:   GOALS    Short Term Goals:  3   weeks Long Term Goals:   6  weeks   1). Establish HEP-MET 1). Pt independent with HEP   2). Pain  3/10 or less at worst- MET today 2). Pain  1/10 or less at worst   3). Pt able to maintain arms above head with minimal to no noted difficulty. 3). Increase B flex and abd to 175 with no note discomfort- Progressing      4). 4). Pt is able to pull self up from pool with no noted difficulty. 5). 5). Pt demonstrates minimal to no noted tenderness with palpation. 6). 6). Overall Progression Towards Functional goals/ Treatment Progress Update:  [] Patient is progressing as expected towards functional goals listed. [x] Progression is slowed due to complexities/Impairments listed. [] Progression has been slowed due to co-morbidities.   [] Plan just implemented, too soon to assess goals progression <30days   [] Goals require adjustment due to lack of progress  [] Patient is not progressing as expected and requires additional follow up with physician  [] Other    Prognosis for POC: [x] Good [] Fair  [] Poor    Patient requires continued skilled intervention: [x] Yes  [] No    Treatment/Activity Tolerance:  [x] Patient able to complete treatment  [] Patient limited by fatigue  [] Patient limited by pain    [] Patient limited by other medical complications  [] Other:       PLAN: See eval  [x] Continue per plan of care [] Alter current plan (see comments above)  [] Plan of care initiated [] Hold pending MD visit [] Discharge  Recommend to continue 2 times per week for 2 more weeks. Treatment to include scapular mobilization, scapular strengthening exercise, US to right rotator cuff. Tissue mobilization as need and ROM UE. Therapeutic Exercise and NMR EXR  [x] (76750) Provided verbal/tactile cueing for activities related to strengthening, flexibility, endurance, ROM  for improvements in proximal strength and core control with self care, mobility, lifting and ambulation.  [] (82461) Provided verbal/tactile cueing for activities related to improving balance, coordination, kinesthetic sense, posture, motor skill, proprioception  to assist with core control in self care, mobility, lifting, and ambulation.      Therapeutic Activities and Gait:    [] (35200 or 81144) Provided verbal/tactile cueing for activities related to improving balance, coordination, kinesthetic sense, posture, motor skill, proprioception and motor activation to allow for proper function  with self care and ADLs  [] (06289) Provided training and instruction to the patient for proper core and proximal hip recruitment and positioning with ambulation re-education     Home Exercise Program:    [x] (53150) Reviewed/Progressed HEP activities related to strengthening, flexibility, endurance, ROM of core, proximal hip and LE for functional self-care, mobility, lifting and ambulation   [] (00579) Reviewed/Progressed HEP activities related to improving balance, coordination, kinesthetic sense, posture, motor skill, proprioception of core, proximal hip and LE for self care, mobility, lifting, and ambulation      Manual Treatments:  PROM / STM / Oscillations-Mobs:  G-I, II, III, IV (PA's, Inf., Post.)  [x] (94429) Provided manual therapy to mobilize proximal hip and LS spine soft tissue/joints for the purpose of modulating pain, promoting relaxation,  increasing ROM, reducing/eliminating soft tissue swelling/inflammation/restriction, improving soft tissue extensibility and allowing for proper ROM for normal function with self care, mobility, lifting and ambulation. []CRP:  Canalith Repositioning procedure for the assessment, treatment and education of BPPV    Modalities: US to right shoulder region      Charges:  Timed Code Treatment Minutes: 41   Total Treatment Minutes: 41     Medicare Cap total YTD:        [x]N/A  Workers Comp Time Stamp  (Per CPT and Total Treatment) [x]N/A   Time In:   Time Out:       [] EVAL    [] Dry Needling  [x] IX(23821)   x  1  [] EStim Unattended 33141  [] NMR (53756)  x     [] Estim Attended  47613  [x] Manual (08697)  x  1  [] Mechanical Txn 08385  [] TA    x     [x] Ultrasound  [] Gait   x  [] Vaso  [] CRP    [] Ionto           [] Other:        Electronically signed by: DHARA Sow,[id number]      Note: If patient does not return for scheduled/ recommended follow up visits, this note will serve as a discharge from care along with most recent update on progress.

## 2022-09-06 ENCOUNTER — HOSPITAL ENCOUNTER (OUTPATIENT)
Dept: PHYSICAL THERAPY | Age: 52
Setting detail: THERAPIES SERIES
Discharge: HOME | End: 2022-09-06
Payer: OTHER GOVERNMENT

## 2022-09-06 PROCEDURE — 97110 THERAPEUTIC EXERCISES: CPT

## 2022-09-06 PROCEDURE — 97140 MANUAL THERAPY 1/> REGIONS: CPT

## 2022-09-06 PROCEDURE — 97035 APP MDLTY 1+ULTRASOUND EA 15: CPT

## 2022-09-06 NOTE — FLOWSHEET NOTE
Physical Therapy Daily Treatment Note    [x]Daily Tx Note    []Progress Note    [] Discharge Summary         Date:  2022    Patient Name:  Ronaldo You    :  1970  MRN: [id number]      Medical/Treatment Diagnosis Information:  Diagnosis: R07.89 Bilateral chest wall pain localized to anterior latissimus dorsi bilaterally  Treatment Diagnosis: Thoracic pain    Insurance/Certification information:  PT Insurance Information: [employer]- no authorization    Physician Information:  Referring Provider (secondary): Hannah Duke MD    Plan of care sent to provider:      [x]Faxed   []Co-signature    (attempts: 1[x] 22 2 []3[])     Plan of care signed :  [x]  Yes  [] No  Signed 2022      Date of Patient follow up with Physician:     Is this a Progress Report:     []  Yes  [x]  No      If Yes:  Date Range for reporting period:  Beginning 2022  Ending 2022    Progress report will be due (10 Rx or 30 days whichever is less):       Recertification will be due (POC Duration  / 90 days whichever is less):     Visit # Insurance Allowable Auth Required   15/18  []  Yes [x]  No        Functional Scale:  FOTO    Date    2022  Score   65  Predicted Score: 71  Predicted Visits:  10    Latex Allergy:  [x]NO      []YES  Preferred Language for Healthcare:   [x]English       []other:    RESTRICTIONS/PRECAUTIONS:  previous PE in 2021. Pt reports that she has recently lost weight with 15 lbs in last 3-4 months. 2017 pt was Dx with right breast atypical ductal hyperplasia. SUBJECTIVE:  Pt reports that Friday night she felt discomfort laying on right side but felt pain left side in flank region. Pt states that today her pain feels more muscular.       Pain level:  Right shoulder and flank region 3/10     Plan Moving Forward/ For next visit:   Manual techniques for rib dysfunction and asymmetrical alignment; manual scapula mobilization  Exercise as able for stretch and strengthening    OBJECTIVE:   Observation: tenderness in trap and right scapular region  Exercises/Interventions:     Exercises in bold performed in department today. Items not bolded are carried forward from prior visits for continuity of the record. Exercise/Equipment Resistance/Repetitions HEP Other comments     Towel roll supine with stretch of pec region  reviewed []    Barrel stretch    3 reps hold 10 seconds to stretch right []    Ceiling punches supine and figure 8   1x10 B hold [] Pt reports that she had increased pain since. inferior glide with towel roll   PRN []     Prone mid row and extension 1x10 bilateral []      Shoulder shrugs and shoulder blade squeezes   1x10 []    Adduction red theraband  1x10  []    Mid and low rows red theraband   1x10 []    Diaphragmatic  breathing 1x10 [] Issued handout   Theraband ER red     1x10 with towel roll and going from stomach to side of body. [] Hold secondary to increase discomfort. Added glut sets and bridges secondary to posterior innominate on right    1x10 glut sets hold 5 seconds   1x10 bridges hold 2-3 secs [] Pt was complaining of pain in hip region last evening       []        []        []        []        []        []      UE bike  Forward 1 minute attempted backward with increase pain stopped. []      Therapeutic Exercise/Home Exercise Program:  16  minutes  PROM: right UE  See above  Access code [email or web address]    Therapeutic Activity:  0 minutes    Gait: 0 minutes    Neuromuscular Re-Education:  0 minutes      Canalith Repositioning Procedure:      Manual Therapy:  20 minutes  Assessment:   Assessed breast tissue still with noted scar tissue 2 areas as well as decreased tissue mobility in bilateral thoracic region distal to breast as well as right pec region. Pelvic region posterior innominate on right compared to left  Mid thoracic symmetrical   Slight less elevation of left shoulder region and less rounded shoulders.    Palpation moderate scapular tenderness on right. Manual treatment:   Tissue mobilization to right breast and axillary region Right arm skin tractioning and stretching. Scar massage  MET of mid thoracic region in sitting and MET for left rib torsion in sidelying. Right discomfort with supine position with increase shoulder roundness B inferior glide and distraction B, performed posterior glide; PA to mid and upper thoracic in prone, gentle; gentle first rib mobilization. sidelying scapular mobs R, severe to moderate restriction on right scapula compared to right. Thoracic distraction seated  Gentle TPR in upper trap R  Rhythmic stabilization   Pelvis clearing and sidelying MET for posterior innominate on right     Modalities:  12 minutes including set up  US to right R/C region secondary to tenderness noted with palpation; 50% 1.5 w/cm2 1 mHz     ASSESSMENT:   Pt demonstrates improvement with ROM. Goals:   GOALS    Short Term Goals:  3   weeks Long Term Goals:   6  weeks   1). Establish HEP-MET 1). Pt independent with HEP   2). Pain  3/10 or less at worst- MET today 2). Pain  1/10 or less at worst   3). Pt able to maintain arms above head with minimal to no noted difficulty. 3). Increase B flex and abd to 175 with no note discomfort- Progressing      4). 4). Pt is able to pull self up from pool with no noted difficulty. 5). 5). Pt demonstrates minimal to no noted tenderness with palpation. 6). 6). Overall Progression Towards Functional goals/ Treatment Progress Update:  [] Patient is progressing as expected towards functional goals listed. [x] Progression is slowed due to complexities/Impairments listed. [] Progression has been slowed due to co-morbidities.   [] Plan just implemented, too soon to assess goals progression <30days   [] Goals require adjustment due to lack of progress  [] Patient is not progressing as expected and requires additional follow up with physician  [] Other    Prognosis for POC: [x] Good [] Fair  [] Poor    Patient requires continued skilled intervention: [x] Yes  [] No    Treatment/Activity Tolerance:  [x] Patient able to complete treatment  [] Patient limited by fatigue  [] Patient limited by pain    [] Patient limited by other medical complications  [] Other:       PLAN: See eval  [x] Continue per plan of care [] Alter current plan (see comments above)  [] Plan of care initiated [] Hold pending MD visit [] Discharge  Recommend to continue 2 times per week for 2 more weeks. Treatment to include scapular mobilization, scapular strengthening exercise, US to right rotator cuff. Tissue mobilization as need and ROM UE. Therapeutic Exercise and NMR EXR  [x] (26169) Provided verbal/tactile cueing for activities related to strengthening, flexibility, endurance, ROM  for improvements in proximal strength and core control with self care, mobility, lifting and ambulation.  [] (53457) Provided verbal/tactile cueing for activities related to improving balance, coordination, kinesthetic sense, posture, motor skill, proprioception  to assist with core control in self care, mobility, lifting, and ambulation.      Therapeutic Activities and Gait:    [] (64832 or 36717) Provided verbal/tactile cueing for activities related to improving balance, coordination, kinesthetic sense, posture, motor skill, proprioception and motor activation to allow for proper function  with self care and ADLs  [] (80250) Provided training and instruction to the patient for proper core and proximal hip recruitment and positioning with ambulation re-education     Home Exercise Program:    [x] (05400) Reviewed/Progressed HEP activities related to strengthening, flexibility, endurance, ROM of core, proximal hip and LE for functional self-care, mobility, lifting and ambulation   [] (74620) Reviewed/Progressed HEP activities related to improving balance, coordination, kinesthetic sense, posture, motor skill, proprioception of core,

## 2022-09-08 ENCOUNTER — APPOINTMENT (OUTPATIENT)
Dept: PHYSICAL THERAPY | Age: 52
End: 2022-09-08
Payer: OTHER GOVERNMENT

## 2022-09-13 ENCOUNTER — HOSPITAL ENCOUNTER (OUTPATIENT)
Dept: PHYSICAL THERAPY | Age: 52
Setting detail: THERAPIES SERIES
Discharge: HOME | End: 2022-09-13
Payer: OTHER GOVERNMENT

## 2022-09-13 PROCEDURE — 97140 MANUAL THERAPY 1/> REGIONS: CPT

## 2022-09-13 PROCEDURE — 97110 THERAPEUTIC EXERCISES: CPT

## 2022-09-13 PROCEDURE — 97035 APP MDLTY 1+ULTRASOUND EA 15: CPT

## 2022-09-13 NOTE — PROGRESS NOTES
Physical Therapy Daily Treatment Note    [x]Daily Tx Note    [x]Progress Note    [] Discharge Summary         Date:  2022    Patient Name:  Milind Ko    :  1970  MRN: [id number]      Medical/Treatment Diagnosis Information:  Diagnosis: R07.89 Bilateral chest wall pain localized to anterior latissimus dorsi bilaterally  Treatment Diagnosis: Thoracic pain    Insurance/Certification information:  PT Insurance Information: [employer]- no authorization    Physician Information:  Referring Provider (secondary): Carmelo Liang MD    Plan of care sent to provider:      [x]Faxed   []Co-signature    (attempts: 1[x] 22 2 []3[])     Plan of care signed :  [x]  Yes  [] No  Signed 2022      Date of Patient follow up with Physician:     Is this a Progress Report:     []  Yes  [x]  No      If Yes:  Date Range for reporting period:  Beginning 2022  Ending 2022    Progress report will be due (10 Rx or 30 days whichever is less):      Recertification will be due (POC Duration  / 90 days whichever is less):     Visit # Insurance Allowable Auth Required     []  Yes [x]  No        Functional Scale:  FOTO    Date    2022  Score   64  Predicted Score: 71  Predicted Visits:  10    Latex Allergy:  [x]NO      []YES  Preferred Language for Healthcare:   [x]English       []other:    RESTRICTIONS/PRECAUTIONS:  previous PE in 2021. Pt reports that she has recently lost weight with 15 lbs in last 3-4 months. 2017 pt was Dx with right breast atypical ductal hyperplasia. SUBJECTIVE:  Pt reports that Wednesday and Thursday was mild and then Friday irritation with increase on Saturday more wanting to pinch and waking her up. Pt was in car traveling more. Pain level:  Right shoulder shoulder blade 3/10 beginning and end 2/10     Plan Moving Forward/ For next visit:   Manual techniques tissue mobilization.    Exercise as able for stretch and strengthening    OBJECTIVE: Observation: tenderness in trap and right scapular region  Exercises/Interventions:     Exercises in bold performed in department today. Items not bolded are carried forward from prior visits for continuity of the record. Exercise/Equipment Resistance/Repetitions HEP Other comments     Towel roll supine with stretch of pec region  reviewed []    Barrel stretch    3 reps hold 10 seconds to stretch right []    Ceiling punches supine and figure 8   1x10 B hold [] Pt reports that she had increased pain since. inferior glide with towel roll   PRN []     Prone mid row and extension 1x10 bilateral []      Shoulder shrugs and shoulder blade squeezes   1x10 []    Adduction red theraband  1x10  []    Mid and low rows red theraband   1x10 []    Diaphragmatic  breathing 1x10 [] Issued handout   Theraband ER red     1x10 with towel roll and going from stomach to side of body. [] Hold secondary to increase discomfort. Added glut sets and bridges secondary to posterior innominate on right    1x10 glut sets hold 5 seconds   1x10 bridges hold 2-3 secs [] Pt was complaining of pain in hip region last evening       []        []        []        []        []        []      UE bike  Forward 2 minute and 2 backward seat on 8  []      Therapeutic Exercise/Home Exercise Program:  20  minutes  PROM: right UE  See above  Access code [email or web address]  AROM: flex R 163, L 165; abd R 108, L 164; ER B T3; IR R lower thoracic, L mid thoracic.   PROM: flex R170, L178 ; abd R178, L180; ER R 70 @ 90 degrees abduction, L 90 @ 90 degrees abduction; IR R80 @ 85 degrees abduction, L90 @ 85 degrees abduction    Therapeutic Activity:  0 minutes    Gait: 0 minutes    Neuromuscular Re-Education:  0 minutes      Canalith Repositioning Procedure:      Manual Therapy:  12 minutes  Assessment:   Assessed breast tissue still with noted scar tissue 2 areas as well as decreased tissue mobility in bilateral thoracic region distal to breast as well as right pec region. Pelvic region posterior innominate on right compared to left  Mid thoracic symmetrical   Slight less elevation of left shoulder region and less rounded shoulders. Palpation moderate scapular tenderness on right. Manual treatment:   Tissue mobilization to right breast and axillary region Right arm skin tractioning and stretching. Scar massage  MET of mid thoracic region in sitting and MET for left rib torsion in sidelying. Right discomfort with supine position with increase shoulder roundness R inferior glide and distraction R, performed posterior glide; PA to mid and upper thoracic in prone, gentle; gentle first rib mobilization. sidelying scapular mobs R, severe to moderate restriction on right scapula compared to right. Thoracic distraction seated  Gentle TPR in upper trap R  Rhythmic stabilization   Pelvis clearing and sidelying MET for posterior innominate on right     Modalities:  12 minutes including set up  US to right R/C region secondary to tenderness noted with palpation; 50% 1.5 w/cm2 1 mHz     ASSESSMENT:   Pt demonstrates improvement with ROM. Goals:   GOALS    Short Term Goals:  3   weeks Long Term Goals:   6  weeks   1). Establish HEP-MET 1). Pt independent with HEP   2). Pain  3/10 or less at worst- MET today 2). Pain  1/10 or less at worst   3). Pt able to maintain arms above head with minimal to no noted difficulty. 3). Increase B flex and abd to 175 with no note discomfort- Progressing      4). 4). Pt is able to pull self up from pool with no noted difficulty. 5). 5). Pt demonstrates minimal to no noted tenderness with palpation. 6). 6). Overall Progression Towards Functional goals/ Treatment Progress Update:  [] Patient is progressing as expected towards functional goals listed. [x] Progression is slowed due to complexities/Impairments listed. [] Progression has been slowed due to co-morbidities.   [] Plan just implemented, too soon to assess goals progression <30days   [] Goals require adjustment due to lack of progress  [] Patient is not progressing as expected and requires additional follow up with physician  [] Other    Prognosis for POC: [x] Good [] Fair  [] Poor    Patient requires continued skilled intervention: [x] Yes  [] No    Treatment/Activity Tolerance:  [x] Patient able to complete treatment  [] Patient limited by fatigue  [] Patient limited by pain    [] Patient limited by other medical complications  [] Other:       PLAN: See eval  [x] Continue per plan of care [] Alter current plan (see comments above)  [] Plan of care initiated [] Hold pending MD visit [] Discharge  Recommend to continue 2 times per week for 1 more weeks. Treatment to include scapular mobilization, scapular strengthening exercise, US to right rotator cuff. Tissue mobilization as need and ROM UE. Therapeutic Exercise and NMR EXR  [x] (99546) Provided verbal/tactile cueing for activities related to strengthening, flexibility, endurance, ROM  for improvements in proximal strength and core control with self care, mobility, lifting and ambulation.  [] (86820) Provided verbal/tactile cueing for activities related to improving balance, coordination, kinesthetic sense, posture, motor skill, proprioception  to assist with core control in self care, mobility, lifting, and ambulation.      Therapeutic Activities and Gait:    [] (31854 or 27762) Provided verbal/tactile cueing for activities related to improving balance, coordination, kinesthetic sense, posture, motor skill, proprioception and motor activation to allow for proper function  with self care and ADLs  [] (47102) Provided training and instruction to the patient for proper core and proximal hip recruitment and positioning with ambulation re-education     Home Exercise Program:    [x] (66037) Reviewed/Progressed HEP activities related to strengthening, flexibility, endurance, ROM of core, proximal hip and LE for functional self-care, mobility, lifting and ambulation   [] (44568) Reviewed/Progressed HEP activities related to improving balance, coordination, kinesthetic sense, posture, motor skill, proprioception of core, proximal hip and LE for self care, mobility, lifting, and ambulation      Manual Treatments:  PROM / STM / Oscillations-Mobs:  G-I, II, III, IV (PA's, Inf., Post.)  [x] (81873) Provided manual therapy to mobilize proximal hip and LS spine soft tissue/joints for the purpose of modulating pain, promoting relaxation,  increasing ROM, reducing/eliminating soft tissue swelling/inflammation/restriction, improving soft tissue extensibility and allowing for proper ROM for normal function with self care, mobility, lifting and ambulation. []CRP:  Canalith Repositioning procedure for the assessment, treatment and education of BPPV    Modalities: US to right shoulder region      Charges:  Timed Code Treatment Minutes: 44   Total Treatment Minutes: 44     Medicare Cap total YTD:        [x]N/A  Workers Comp Time Stamp  (Per CPT and Total Treatment) [x]N/A   Time In:   Time Out:       [] EVAL    [] Dry Needling  [x] YG(21221)   x  1  [] EStim Unattended 80707  [] NMR (72888)  x     [] Estim Attended  90934  [x] Manual (19636)  x  1  [] Mechanical Txn 14095  [] TA    x     [x] Ultrasound  [] Gait   x  [] Vaso  [] CRP    [] Ionto           [] Other:        Electronically signed by: BALBIR Carmona,[id number]      Note: If patient does not return for scheduled/ recommended follow up visits, this note will serve as a discharge from care along with most recent update on progress.

## 2022-09-15 ENCOUNTER — HOSPITAL ENCOUNTER (OUTPATIENT)
Dept: PHYSICAL THERAPY | Age: 52
Setting detail: THERAPIES SERIES
Discharge: HOME | End: 2022-09-15
Payer: OTHER GOVERNMENT

## 2022-09-15 PROCEDURE — 97110 THERAPEUTIC EXERCISES: CPT

## 2022-09-15 PROCEDURE — 97035 APP MDLTY 1+ULTRASOUND EA 15: CPT

## 2022-09-15 NOTE — FLOWSHEET NOTE
Physical Therapy Daily Treatment Note    [x]Daily Tx Note    []Progress Note    [] Discharge Summary         Date:  9/15/2022    Patient Name:  Corby Knight    :  1970  MRN: [id number]      Medical/Treatment Diagnosis Information:  Diagnosis: R07.89 Bilateral chest wall pain localized to anterior latissimus dorsi bilaterally  Treatment Diagnosis: Thoracic pain    Insurance/Certification information:  PT Insurance Information: [employer]- no authorization    Physician Information:  Referring Provider (secondary): Alfredo Martinez MD    Plan of care sent to provider:      [x]Faxed   []Co-signature    (attempts: 1[x] 22 2 []3[])     Plan of care signed :  [x]  Yes  [] No  Signed 2022      Date of Patient follow up with Physician:     Is this a Progress Report:     []  Yes  [x]  No      If Yes:  Date Range for reporting period:  Beginning 2022  Ending 2022    Progress report will be due (10 Rx or 30 days whichever is less):      Recertification will be due (POC Duration  / 90 days whichever is less):     Visit # Insurance Allowable Auth Required     []  Yes [x]  No        Functional Scale:  FOTO    Date    2022  Score   64  Predicted Score: 71  Predicted Visits:  10    Latex Allergy:  [x]NO      []YES  Preferred Language for Healthcare:   [x]English       []other:    RESTRICTIONS/PRECAUTIONS:  previous PE in 2021. Pt reports that she has recently lost weight with 15 lbs in last 3-4 months. 2017 pt was Dx with right breast atypical ductal hyperplasia. SUBJECTIVE: Pt reports that she had a bad night with increased discomfort. Pt states that she went to hemologist/oncologist for testing secondary to issues. Pt states that today she is feeling better. Pain level:  Right shoulder shoulder blade 2/10 beginning and end 2/10     Plan Moving Forward/ For next visit:   Manual techniques tissue mobilization.    Exercise as able for stretch and strengthening    OBJECTIVE:     Exercises/Interventions:     Exercises in bold performed in department today. Items not bolded are carried forward from prior visits for continuity of the record. Exercise/Equipment Resistance/Repetitions HEP Other comments     Towel roll supine with stretch of pec region  reviewed []    Barrel stretch    3 reps hold 10 seconds to stretch right []    Ceiling punches supine and figure 8   1x10 B hold [] Pt reports that she had increased pain since. inferior glide with towel roll   PRN []     Prone mid row and extension 1x10 bilateral []      Shoulder shrugs and shoulder blade squeezes   1x10 []    Adduction red theraband  1x10  []    Mid and low rows red theraband   1x10 []    Diaphragmatic  breathing 1x10 [] Issued handout   Theraband ER red     1x10 with towel roll and going from stomach to side of body. [] Hold secondary to increase discomfort. Added glut sets and bridges secondary to posterior innominate on right    1x10 glut sets hold 5 seconds   1x10 bridges hold 2-3 secs [] Pt was complaining of pain in hip region last evening       []        []        []        []        []        []      UE bike  Forward 2 minute and 2 backward seat on 8  [] Held secondary to noted increase in pain over last few days. Therapeutic Exercise/Home Exercise Program:  12  minutes  PROM: right UE  PROM: right flex 176, Abd 180, IR 75 at 90 degrees abduction, ER 50 at 85 degrees abduction  See above  Access code [email or web address]    Therapeutic Activity:  0 minutes    Gait: 0 minutes    Neuromuscular Re-Education:  0 minutes      Canalith Repositioning Procedure:      Manual Therapy:  6 minutes  Assessment:   Assessed breast tissue still with noted scar tissue 2 areas as well as decreased tissue mobility in bilateral thoracic region distal to breast as well as right pec region.    Pelvic region posterior innominate on right compared to left  Mid thoracic symmetrical   Slight less elevation of left shoulder region and less rounded shoulders. Palpation moderate scapular tenderness on right. Manual treatment:   Tissue mobilization to right breast and axillary region Right arm skin tractioning and stretching. Scar massage  MET of mid thoracic region in sitting and MET for left rib torsion in sidelying. Right discomfort with supine position with increase shoulder roundness R inferior glide and distraction R, performed posterior glide; PA to mid and upper thoracic in prone, gentle; gentle first rib mobilization. sidelying scapular mobs R, severe to moderate restriction on right scapula compared to right. Thoracic distraction seated  Gentle TPR in upper trap R  Rhythmic stabilization   Pelvis clearing and sidelying MET for posterior innominate on right     Modalities:  10 minutes including set up  US to right R/C region secondary to tenderness noted with palpation; 50% 1.5 w/cm2 1 mHz     ASSESSMENT:   Pt demonstrates improvement with ROM. Goals:   GOALS    Short Term Goals:  3   weeks Long Term Goals:   6  weeks   1). Establish HEP-MET 1). Pt independent with HEP   2). Pain  3/10 or less at worst- MET today 2). Pain  1/10 or less at worst   3). Pt able to maintain arms above head with minimal to no noted difficulty. 3). Increase B flex and abd to 175 with no note discomfort- Progressing      4). 4). Pt is able to pull self up from pool with no noted difficulty. 5). 5). Pt demonstrates minimal to no noted tenderness with palpation. 6). 6). Overall Progression Towards Functional goals/ Treatment Progress Update:  [] Patient is progressing as expected towards functional goals listed. [x] Progression is slowed due to complexities/Impairments listed. [] Progression has been slowed due to co-morbidities.   [] Plan just implemented, too soon to assess goals progression <30days   [] Goals require adjustment due to lack of progress  [] Patient is not progressing as expected and requires additional follow up with physician  [] Other    Prognosis for POC: [x] Good [] Fair  [] Poor    Patient requires continued skilled intervention: [x] Yes  [] No    Treatment/Activity Tolerance:  [x] Patient able to complete treatment  [] Patient limited by fatigue  [] Patient limited by pain    [] Patient limited by other medical complications  [] Other:       PLAN: See eval  [] Continue per plan of care [x] Alter current plan (see comments above)  [] Plan of care initiated [] Hold pending MD visit [] Discharge  Will see pt in 2 weeks to follow up. Pt is going out of town for work. Treatment to include scapular mobilization, scapular strengthening exercise, US to right rotator cuff. Tissue mobilization as need and ROM UE. Therapeutic Exercise and NMR EXR  [x] (16017) Provided verbal/tactile cueing for activities related to strengthening, flexibility, endurance, ROM  for improvements in proximal strength and core control with self care, mobility, lifting and ambulation.  [] (74952) Provided verbal/tactile cueing for activities related to improving balance, coordination, kinesthetic sense, posture, motor skill, proprioception  to assist with core control in self care, mobility, lifting, and ambulation.      Therapeutic Activities and Gait:    [] (45649 or 01131) Provided verbal/tactile cueing for activities related to improving balance, coordination, kinesthetic sense, posture, motor skill, proprioception and motor activation to allow for proper function  with self care and ADLs  [] (41785) Provided training and instruction to the patient for proper core and proximal hip recruitment and positioning with ambulation re-education     Home Exercise Program:    [x] (47190) Reviewed/Progressed HEP activities related to strengthening, flexibility, endurance, ROM of core, proximal hip and LE for functional self-care, mobility, lifting and ambulation   [] (59727) Reviewed/Progressed HEP activities related to improving balance, coordination, kinesthetic sense, posture, motor skill, proprioception of core, proximal hip and LE for self care, mobility, lifting, and ambulation      Manual Treatments:  PROM / STM / Oscillations-Mobs:  G-I, II, III, IV (PA's, Inf., Post.)  [x] (63009) Provided manual therapy to mobilize proximal hip and LS spine soft tissue/joints for the purpose of modulating pain, promoting relaxation,  increasing ROM, reducing/eliminating soft tissue swelling/inflammation/restriction, improving soft tissue extensibility and allowing for proper ROM for normal function with self care, mobility, lifting and ambulation. []CRP:  Canalith Repositioning procedure for the assessment, treatment and education of BPPV    Modalities: US to right shoulder region      Charges:  Timed Code Treatment Minutes: 28   Total Treatment Minutes: 28     Medicare Cap total YTD:        [x]N/A  Workers Comp Time Stamp  (Per CPT and Total Treatment) [x]N/A   Time In:   Time Out:       [] EVAL    [] Dry Needling  [x] RA(92062)   x  1  [] EStim Unattended 91060  [] NMR (34696)  x     [] Estim Attended  74508  [] Manual (89424)  x    [] Mechanical Txn 78587  [] TA    x     [x] Ultrasound  [] Gait   x  [] Vaso  [] CRP    [] Ionto           [] Other:        Electronically signed by: JOSE L Rodriguez,[id number]      Note: If patient does not return for scheduled/ recommended follow up visits, this note will serve as a discharge from care along with most recent update on progress.

## 2022-09-24 ENCOUNTER — HOSPITAL ENCOUNTER (OUTPATIENT)
Dept: CT IMAGING | Age: 52
Discharge: HOME | End: 2022-09-24
Payer: OTHER GOVERNMENT

## 2022-09-24 DIAGNOSIS — I26.94 MULTIPLE SUBSEGMENTAL PULMONARY EMBOLI WITHOUT ACUTE COR PULMONALE (HCC): ICD-10-CM

## 2022-09-24 DIAGNOSIS — R19.09 LUMP IN THE GROIN: ICD-10-CM

## 2022-09-24 PROCEDURE — 6360000004 HC RX CONTRAST MEDICATION: Performed by: INTERNAL MEDICINE

## 2022-09-24 PROCEDURE — 74177 CT ABD & PELVIS W/CONTRAST: CPT

## 2022-09-24 RX ADMIN — IOPAMIDOL 75 ML: 755 INJECTION, SOLUTION INTRAVENOUS at 16:57

## 2022-09-24 RX ADMIN — IOHEXOL 50 ML: 240 INJECTION, SOLUTION INTRATHECAL; INTRAVASCULAR; INTRAVENOUS; ORAL at 16:56

## 2022-10-04 ENCOUNTER — HOSPITAL ENCOUNTER (OUTPATIENT)
Dept: PHYSICAL THERAPY | Age: 52
Setting detail: THERAPIES SERIES
Discharge: HOME | End: 2022-10-04
Payer: OTHER GOVERNMENT

## 2022-10-04 PROCEDURE — 97110 THERAPEUTIC EXERCISES: CPT

## 2022-10-04 PROCEDURE — 97140 MANUAL THERAPY 1/> REGIONS: CPT

## 2022-10-04 NOTE — PROGRESS NOTES
[facility] Outpatient Therapy  [address] [place] [address]  DEEPIKA Childs   Phone: [phone]      Fax:   [phone]     The following patient was re-assessed. A summary of our findings can be found below in the re-assessment below. This includes our plan of care. If you have any questions or concerns regarding these findings, please do not hesitate to contact me at the office phone number above.   Thank you for this referral.     Recommendations:_____________________________________________     Signature:_____________________________________________________   Physical Therapy Daily Treatment Note    [x]Daily Tx Note    [x]Progress Note    [] Discharge Summary         Date:  10/4/2022    Patient Name:  Jayshree Paulino    :  1970  MRN: [id number]      Medical/Treatment Diagnosis Information:  Diagnosis: R07.89 Bilateral chest wall pain localized to anterior latissimus dorsi bilaterally  Treatment Diagnosis: Thoracic pain    Insurance/Certification information:  PT Insurance Information: [employer]- no authorization    Physician Information:  Referring Provider (secondary): Zurdo Chavarria MD    Plan of care sent to provider:      [x]Faxed   []Co-signature    (attempts: 1[x] 22 2 []3[])     Plan of care signed :  [x]  Yes  [] No  Signed 2022      Date of Patient follow up with Physician:     Is this a Progress Report:     []  Yes  [x]  No      If Yes:  Date Range for reporting period:  Beginning 2022  Ending 10/4/2022    Progress report will be due (10 Rx or 30 days whichever is less):   pt on hold until bone scan and mammogram.  Recertification will be due (POC Duration  / 90 days whichever is less):     Visit # Insurance Allowable Auth Required     []  Yes [x]  No        Functional Scale:  FOTO    Date    10/4/2022  Score   65  Predicted Score: 71  Predicted Visits:  10    Latex Allergy:  [x]NO      []YES  Preferred Language for Healthcare:   [x]English []other:    RESTRICTIONS/PRECAUTIONS:  previous PE in 4/2021. Pt reports that she has recently lost weight with 15 lbs in last 3-4 months. 12/2017 pt was Dx with right breast atypical ductal hyperplasia. SUBJECTIVE: Pt goes for bone scan tomorrow, mammogram 10/6/2022 and back to hematology oncologist  10/10/2022. Pt reports 2 1/2 weeks ago numbness and tingling started in left shoulder blades. It is daily and a bit short lived 5-10 minutes through day and nothing seems to cause it. Pt reports that right shoulder and posterior arm pinching. Pt reports not as consistence with exercise with traveling. Pt reports that she has more mobility overall but limited with ER. Pt states that pain is more severe at times with night time worst and more intense with certain movements. Pain level:  2-3/10 in posterior aspect of right shoulder; pt reports that when she was coming more regular to PT more located area of pain now more across thoracic region with numbness and tingling across. Plan Moving Forward/ For next visit:   Manual techniques tissue mobilization. Exercise as able for stretch and strengthening    OBJECTIVE:   Palpation: mild tenderness bilateral RC and right scapula region. Exercises/Interventions:     Exercises in bold performed in department today. Items not bolded are carried forward from prior visits for continuity of the record. Exercise/Equipment Resistance/Repetitions HEP Other comments     Towel roll supine with stretch of pec region  reviewed []    Barrel stretch    3 reps hold 10 seconds to stretch right []    Ceiling punches supine and figure 8   1x10 B hold [] Pt reports that she had increased pain since.       inferior glide with towel roll   PRN []     Prone mid row and extension 1x10 bilateral []      Shoulder shrugs and shoulder blade squeezes   1x10 []    Adduction red theraband  1x10  []    Mid and low rows red theraband   1x10 []    Diaphragmatic  breathing 1x10 [] Issued handout   Theraband ER red     1x10 with towel roll and going from stomach to side of body. [] Hold secondary to increase discomfort. Added glut sets and bridges secondary to posterior innominate on right    1x10 glut sets hold 5 seconds   1x10 bridges hold 2-3 secs [] Pt was complaining of pain in hip region last evening       []        []        []        []        []        []      UE bike  Forward 2 minute and 2 backward seat on 8  [] Held secondary to noted increase in pain over last few days. Therapeutic Exercise/Home Exercise Program:  30  minutes  PROM: right UE  PROM: flex R 172 required mild distraction, L 180; Abd R 175, L 180;  IR R 70 at 90 degrees abduction; ER 60 at 90 degrees abduction  See above  AROM: flex R 160, L 165; abd R 160 pain (at 98 degrees increased pain in posterior arm), L166; ER B T2 with compensation at scapula; IR B midthoracic, tightness right. Access code [email or web address]  Discussed watching posture with decreased head forward and arm extended out with holding cell phone to see if decreases numbness and tingling. Reviewed HEP    Therapeutic Activity:  0 minutes    Gait: 0 minutes    Neuromuscular Re-Education:  0 minutes      Canalith Repositioning Procedure:      Manual Therapy:  10 minutes  Assessment:   Assessed breast tissue still with noted scar tissue 2 areas as well as decreased tissue mobility in bilateral thoracic region distal to breast as well as right pec region. Pelvic region posterior innominate on right compared to left  Mid thoracic symmetrical   Slight less elevation of left shoulder region and less rounded shoulders. Palpation moderate scapular tenderness on right. Manual treatment:   Tissue mobilization to right breast and axillary region Right arm skin tractioning and stretching. Scar massage  MET of mid thoracic region in sitting and MET for left rib torsion in sidelying.   Right discomfort with supine position with increase shoulder roundness R inferior glide and distraction R, performed posterior glide; PA to mid and upper thoracic in prone, gentle; gentle first rib mobilization. sidelying scapular mobs R, severe to moderate restriction on right scapula compared to right. Thoracic distraction seated  Gentle TPR in upper trap R  Rhythmic stabilization   Pelvis clearing and sidelying MET for posterior innominate on right   Tissue in breast and thoracic region good bilaterally; tightness on right pecs region with posterior glide    Modalities:  0 minutes some noted relief after performing but did not last.   US to right R/C region secondary to tenderness noted with palpation; 50% 1.5 w/cm2 1 mHz     ASSESSMENT:   Pt demonstrates improvement with ROM. Goals:   GOALS    Short Term Goals:  3   weeks Long Term Goals:   6  weeks   1). Establish HEP-MET 1). Pt independent with HEP-MET    2). Pain  3/10 or less at worst- MET today 2). Pain  1/10 or less at worst-NOT MET   3). Pt able to maintain arms above head with minimal to no noted difficulty. -NOT MET 3). Increase B flex and abd to 175 with no note discomfort- Progressing      4). 4). Pt is able to pull self up from pool with no noted difficulty. -NOT MET   5). 5). Pt demonstrates minimal to no noted tenderness with palpation. -NOT MET   6). 6). Overall Progression Towards Functional goals/ Treatment Progress Update:  [] Patient is progressing as expected towards functional goals listed. [x] Progression is slowed due to complexities/Impairments listed. [] Progression has been slowed due to co-morbidities.   [] Plan just implemented, too soon to assess goals progression <30days   [] Goals require adjustment due to lack of progress  [] Patient is not progressing as expected and requires additional follow up with physician  [] Other    Prognosis for POC: [x] Good [] Fair  [] Poor    Patient requires continued skilled intervention: [x] Yes  [] No    Treatment/Activity Tolerance:  [x] Patient able to complete treatment  [] Patient limited by fatigue  [] Patient limited by pain    [] Patient limited by other medical complications  [] Other:       PLAN: See eval  [] Continue per plan of care [x] Alter current plan (see comments above)  [] Plan of care initiated [] Hold pending MD visit [] Discharge  Pt to call after bone scan and mammogram. Recommend orthopedic consult for right shoulder. Pt limited with AROM flexion and ER. Therapeutic Exercise and NMR EXR  [x] (93517) Provided verbal/tactile cueing for activities related to strengthening, flexibility, endurance, ROM  for improvements in proximal strength and core control with self care, mobility, lifting and ambulation.  [] (23357) Provided verbal/tactile cueing for activities related to improving balance, coordination, kinesthetic sense, posture, motor skill, proprioception  to assist with core control in self care, mobility, lifting, and ambulation.      Therapeutic Activities and Gait:    [] (01247 or 30423) Provided verbal/tactile cueing for activities related to improving balance, coordination, kinesthetic sense, posture, motor skill, proprioception and motor activation to allow for proper function  with self care and ADLs  [] (13338) Provided training and instruction to the patient for proper core and proximal hip recruitment and positioning with ambulation re-education     Home Exercise Program:    [x] (29678) Reviewed/Progressed HEP activities related to strengthening, flexibility, endurance, ROM of core, proximal hip and LE for functional self-care, mobility, lifting and ambulation   [] (55371) Reviewed/Progressed HEP activities related to improving balance, coordination, kinesthetic sense, posture, motor skill, proprioception of core, proximal hip and LE for self care, mobility, lifting, and ambulation      Manual Treatments:  PROM / STM / Oscillations-Mobs:  G-I, II, III, IV (PA's, Inf., Post.)  [x] (01704) Provided manual therapy to mobilize proximal hip and LS spine soft tissue/joints for the purpose of modulating pain, promoting relaxation,  increasing ROM, reducing/eliminating soft tissue swelling/inflammation/restriction, improving soft tissue extensibility and allowing for proper ROM for normal function with self care, mobility, lifting and ambulation. []CRP:  Canalith Repositioning procedure for the assessment, treatment and education of BPPV    Modalities: US to right shoulder region      Charges:  Timed Code Treatment Minutes: 40   Total Treatment Minutes: 40     Medicare Cap total YTD:        [x]N/A  Workers Comp Time Stamp  (Per CPT and Total Treatment) [x]N/A   Time In:   Time Out:       [] EVAL    [] Dry Needling  [x] LR(18964)   x  2  [] EStim Unattended 81671  [] NMR (74993)  x     [] Estim Attended  44484  [x] Manual (85978)  x  1  [] Mechanical Txn 90304  [] TA    x     [x] Ultrasound  [] Gait   x  [] Vaso  [] CRP    [] Ionto           [] Other:        Electronically signed by: JASMINA Wiley,[id number]      Note: If patient does not return for scheduled/ recommended follow up visits, this note will serve as a discharge from care along with most recent update on progress.

## 2022-10-05 ENCOUNTER — HOSPITAL ENCOUNTER (OUTPATIENT)
Dept: NUCLEAR MEDICINE | Age: 52
Discharge: HOME | End: 2022-10-05

## 2022-10-05 ENCOUNTER — HOSPITAL ENCOUNTER (OUTPATIENT)
Dept: NUCLEAR MEDICINE | Age: 52
Discharge: HOME | End: 2022-10-05
Payer: OTHER GOVERNMENT

## 2022-10-05 DIAGNOSIS — R19.09 GROIN SWELLING: ICD-10-CM

## 2022-10-05 PROCEDURE — A9503 TC99M MEDRONATE: HCPCS | Performed by: INTERNAL MEDICINE

## 2022-10-05 PROCEDURE — 3430000000 HC RX DIAGNOSTIC RADIOPHARMACEUTICAL: Performed by: INTERNAL MEDICINE

## 2022-10-05 PROCEDURE — 78306 BONE IMAGING WHOLE BODY: CPT | Performed by: INTERNAL MEDICINE

## 2022-10-05 RX ORDER — TC 99M MEDRONATE 20 MG/10ML
25.6 INJECTION, POWDER, LYOPHILIZED, FOR SOLUTION INTRAVENOUS
Status: COMPLETED | OUTPATIENT
Start: 2022-10-05 | End: 2022-10-05

## 2022-10-05 RX ADMIN — TC 99M MEDRONATE 25.6 MILLICURIE: 20 INJECTION, POWDER, LYOPHILIZED, FOR SOLUTION INTRAVENOUS at 10:00

## 2022-10-06 ENCOUNTER — HOSPITAL ENCOUNTER (OUTPATIENT)
Dept: MAMMOGRAPHY | Age: 52
Discharge: HOME | End: 2022-10-06
Payer: OTHER GOVERNMENT

## 2022-10-06 ENCOUNTER — HOSPITAL ENCOUNTER (OUTPATIENT)
Dept: PHYSICAL THERAPY | Age: 52
Setting detail: THERAPIES SERIES
Discharge: HOME | End: 2022-10-06
Payer: OTHER GOVERNMENT

## 2022-10-06 VITALS — HEIGHT: 68 IN | WEIGHT: 144 LBS | BODY MASS INDEX: 21.82 KG/M2

## 2022-10-06 DIAGNOSIS — Z12.31 VISIT FOR SCREENING MAMMOGRAM: ICD-10-CM

## 2022-10-06 PROCEDURE — 77063 BREAST TOMOSYNTHESIS BI: CPT

## 2022-10-06 NOTE — FLOWSHEET NOTE
Physical Therapy  Cancellation/No-show Note  Patient Name:  Eder Cha  :  1970   Date:  10/6/2022  Cancels to Date: 1  No-shows to Date: 0    For today's appointment patient:  [x]  Cancelled by department secondary to pt on hold until upcoming testing is complete. []  Rescheduled appointment  []  No-show     Reason given by patient:  []  Patient ill  []  Conflicting appointment  []  No transportation    []  Conflict with work  []  No reason given  []  Other:     Comments:      Electronically signed by:   Javier Dubon, [address] [place] [address], [id number]

## 2022-12-23 ENCOUNTER — TELEMEDICINE (OUTPATIENT)
Dept: FAMILY MEDICINE CLINIC | Age: 52
End: 2022-12-23
Payer: OTHER GOVERNMENT

## 2022-12-23 DIAGNOSIS — R07.81 RIB PAIN ON RIGHT SIDE: Primary | ICD-10-CM

## 2022-12-23 DIAGNOSIS — Z86.711 HX PULMONARY EMBOLISM: ICD-10-CM

## 2022-12-23 PROBLEM — E27.8 ADRENAL MASS (HCC): Status: ACTIVE | Noted: 2022-12-23

## 2022-12-23 PROCEDURE — 99214 OFFICE O/P EST MOD 30 MIN: CPT | Performed by: NURSE PRACTITIONER

## 2022-12-23 RX ORDER — METHYLPREDNISOLONE 4 MG/1
TABLET ORAL
Qty: 1 KIT | Refills: 0 | Status: SHIPPED | OUTPATIENT
Start: 2022-12-23 | End: 2022-12-29

## 2022-12-23 NOTE — PROGRESS NOTES
Nancy Oro (:  1970) is a Established patient, here for evaluation of the following:    Assessment & Plan   Below is the assessment and plan developed based on review of pertinent history, physical exam, labs, studies, and medications. 1. Hx pulmonary embolism  -     methylPREDNISolone (MEDROL, NAYLA,) 4 MG tablet; As directed, Disp-1 kit, R-0Normal  -     D-Dimer, Quantitative; Future  2. Rib pain on right side  -     methylPREDNISolone (MEDROL, NAYLA,) 4 MG tablet; As directed, Disp-1 kit, R-0Normal  -     D-Dimer, Quantitative; Future      Given the presentation and aggravating factors would favor muscular cause of the discomfort over the PE. Had D dimer rechecked  and was normal at that time. Discussed d dimer can elevated for various reasons including inflammation. She had in mind to have the d dimer done Tuesday. Will trial prednisone taper to her local pharmacy. If symptoms worsen to go to the ER. I did place the D dimer order as requested. Follow up in office for shoulder evaluation recommended. No follow-ups on file. Subjective   HPI    Seen by VV. Past hx of pulmonary after the [employer] covid vaccine 2021. Hx of routine running as well. This is her only hx of PE. Recently with pain right shoulder that has not been evaluated  Past 2 days with pain right flank however the area indicated is actually the right posterior rib area, worse when sitting. Takes advil 1-2 times but no improvement. Feels better when lying down. Denies feeling SOB. Has traveled frequently the past 2 months. Neg for swelling in legs. Wondering if she should have a d dimer checked. Review of Systems   All other systems reviewed and are negative.        Objective   Patient-Reported Vitals  No data recorded     Physical Exam  [INSTRUCTIONS:  \"[x]\" Indicates a positive item  \"[]\" Indicates a negative item  -- DELETE ALL ITEMS NOT EXAMINED]    Constitutional: [x] Appears well-developed and well-nourished [x] No apparent distress      [] Abnormal -     Mental status: [x] Alert and awake  [x] Oriented to person/place/time [x] Able to follow commands    [] Abnormal -     Eyes:   EOM    [x]  Normal    [] Abnormal -   Sclera  [x]  Normal    [] Abnormal -          Discharge [x]  None visible   [] Abnormal -     HENT: [x] Normocephalic, atraumatic  [] Abnormal -   [x] Mouth/Throat: Mucous membranes are moist    External Ears [x] Normal  [] Abnormal -    Neck: [x] No visualized mass [] Abnormal -     Pulmonary/Chest: [x] Respiratory effort normal   [x] No visualized signs of difficulty breathing or respiratory distress        [] Abnormal -      Musculoskeletal:   [x] Normal gait with no signs of ataxia         [x] Normal range of motion of neck        [] Abnormal -     Neurological:        [x] No Facial Asymmetry (Cranial nerve 7 motor function) (limited exam due to video visit)          [x] No gaze palsy        [] Abnormal -          Skin:        [x] No significant exanthematous lesions or discoloration noted on facial skin         [] Abnormal -            Psychiatric:       [x] Normal Affect [] Abnormal -        [x] No Hallucinations    Other pertinent observable physical exam findings:-             Jayshree Paulino, was evaluated through a synchronous (real-time) audio-video encounter. The patient (or guardian if applicable) is aware that this is a billable service, which includes applicable co-pays. This Virtual Visit was conducted with patient's (and/or legal guardian's) consent. The visit was conducted pursuant to the emergency declaration under the [facility], [address] [place] [address] authority and the [employer] Act. Patient identification was verified, and a caregiver was present when appropriate. The patient was located at Home: [address] [place] [address].    Provider was located at Home ([facility]): [place].        --JOB IBARRA - CNP

## 2023-05-09 DIAGNOSIS — M77.51 TENDONITIS OF ANKLE, RIGHT: ICD-10-CM

## 2023-05-09 RX ORDER — NAPROXEN 500 MG/1
TABLET ORAL
Qty: 60 TABLET | Refills: 0 | OUTPATIENT
Start: 2023-05-09

## 2023-07-05 ENCOUNTER — TELEPHONE (OUTPATIENT)
Dept: FAMILY MEDICINE CLINIC | Age: 53
End: 2023-07-05

## 2023-07-05 NOTE — TELEPHONE ENCOUNTER
----- Message from [facility] sent at 7/5/2023  2:35 PM EDT -----  Subject: Referral Request    Reason for referral request? wanting order put in for bloodwork to get   done BEFORE HER APPT ON 07/18/23 PLEASE CALL WHEN ORDER IS IN SO PATIENT   KNOWS SHE CAN GET IT DONE  Provider patient wants to be referred to(if known):     Provider Phone Number(if known):     Additional Information for Provider?   ---------------------------------------------------------------------------  --------------  [address]    [phone]; OK to leave message on voicemail  ---------------------------------------------------------------------------  --------------

## 2023-07-12 ENCOUNTER — TELEPHONE (OUTPATIENT)
Dept: FAMILY MEDICINE CLINIC | Age: 53
End: 2023-07-12

## 2023-07-13 DIAGNOSIS — F40.298 FEAR OF TESTS: ICD-10-CM

## 2023-07-13 DIAGNOSIS — F40.240 CLAUSTROPHOBIA: Primary | ICD-10-CM

## 2023-07-13 RX ORDER — DIAZEPAM 2 MG/1
2 TABLET ORAL ONCE
Qty: 1 TABLET | Refills: 0 | Status: SHIPPED | OUTPATIENT
Start: 2023-07-13 | End: 2023-07-13

## 2023-07-13 NOTE — PROGRESS NOTES
PDMP Monitoring:    Last PDMP Deborah Michel as Reviewed:  Review User Review Instant Review Result   TANA KAPOOR 7/13/2023  9:40 AM Reviewed PDMP [1]       Urine Drug Screenings (1 yr)    No resulted procedures found.        Medication Contract and Consent for Opioid Use Documents Filed        No documents found

## 2023-07-18 ENCOUNTER — OFFICE VISIT (OUTPATIENT)
Dept: FAMILY MEDICINE CLINIC | Age: 53
End: 2023-07-18
Payer: OTHER GOVERNMENT

## 2023-07-18 VITALS
OXYGEN SATURATION: 98 % | SYSTOLIC BLOOD PRESSURE: 118 MMHG | WEIGHT: 146 LBS | TEMPERATURE: 98 F | HEART RATE: 65 BPM | DIASTOLIC BLOOD PRESSURE: 70 MMHG | BODY MASS INDEX: 22.2 KG/M2

## 2023-07-18 DIAGNOSIS — Z00.00 HEALTHCARE MAINTENANCE: Primary | ICD-10-CM

## 2023-07-18 DIAGNOSIS — M25.511 CHRONIC RIGHT SHOULDER PAIN: ICD-10-CM

## 2023-07-18 DIAGNOSIS — G89.29 CHRONIC RIGHT SHOULDER PAIN: ICD-10-CM

## 2023-07-18 PROCEDURE — 99213 OFFICE O/P EST LOW 20 MIN: CPT | Performed by: NURSE PRACTITIONER

## 2023-07-18 PROCEDURE — 99396 PREV VISIT EST AGE 40-64: CPT | Performed by: NURSE PRACTITIONER

## 2023-07-18 PROCEDURE — 3078F DIAST BP <80 MM HG: CPT | Performed by: NURSE PRACTITIONER

## 2023-07-18 PROCEDURE — 3074F SYST BP LT 130 MM HG: CPT | Performed by: NURSE PRACTITIONER

## 2023-07-18 ASSESSMENT — PATIENT HEALTH QUESTIONNAIRE - PHQ9
10. IF YOU CHECKED OFF ANY PROBLEMS, HOW DIFFICULT HAVE THESE PROBLEMS MADE IT FOR YOU TO DO YOUR WORK, TAKE CARE OF THINGS AT HOME, OR GET ALONG WITH OTHER PEOPLE: 0
2. FEELING DOWN, DEPRESSED OR HOPELESS: 0
4. FEELING TIRED OR HAVING LITTLE ENERGY: 1
SUM OF ALL RESPONSES TO PHQ QUESTIONS 1-9: 2
SUM OF ALL RESPONSES TO PHQ QUESTIONS 1-9: 2
9. THOUGHTS THAT YOU WOULD BE BETTER OFF DEAD, OR OF HURTING YOURSELF: 0
SUM OF ALL RESPONSES TO PHQ QUESTIONS 1-9: 2
3. TROUBLE FALLING OR STAYING ASLEEP: 1
SUM OF ALL RESPONSES TO PHQ QUESTIONS 1-9: 2
1. LITTLE INTEREST OR PLEASURE IN DOING THINGS: 0
5. POOR APPETITE OR OVEREATING: 0
7. TROUBLE CONCENTRATING ON THINGS, SUCH AS READING THE NEWSPAPER OR WATCHING TELEVISION: 0
SUM OF ALL RESPONSES TO PHQ9 QUESTIONS 1 & 2: 0
6. FEELING BAD ABOUT YOURSELF - OR THAT YOU ARE A FAILURE OR HAVE LET YOURSELF OR YOUR FAMILY DOWN: 0
8. MOVING OR SPEAKING SO SLOWLY THAT OTHER PEOPLE COULD HAVE NOTICED. OR THE OPPOSITE, BEING SO FIGETY OR RESTLESS THAT YOU HAVE BEEN MOVING AROUND A LOT MORE THAN USUAL: 0

## 2023-07-18 ASSESSMENT — ANXIETY QUESTIONNAIRES
4. TROUBLE RELAXING: 1
5. BEING SO RESTLESS THAT IT IS HARD TO SIT STILL: 0
2. NOT BEING ABLE TO STOP OR CONTROL WORRYING: 1
IF YOU CHECKED OFF ANY PROBLEMS ON THIS QUESTIONNAIRE, HOW DIFFICULT HAVE THESE PROBLEMS MADE IT FOR YOU TO DO YOUR WORK, TAKE CARE OF THINGS AT HOME, OR GET ALONG WITH OTHER PEOPLE: NOT DIFFICULT AT ALL
3. WORRYING TOO MUCH ABOUT DIFFERENT THINGS: 1
6. BECOMING EASILY ANNOYED OR IRRITABLE: 1
GAD7 TOTAL SCORE: 5
1. FEELING NERVOUS, ANXIOUS, OR ON EDGE: 1
7. FEELING AFRAID AS IF SOMETHING AWFUL MIGHT HAPPEN: 0

## 2023-07-19 ENCOUNTER — HOSPITAL ENCOUNTER (OUTPATIENT)
Dept: MRI IMAGING | Age: 53
Discharge: HOME | End: 2023-07-19
Payer: OTHER GOVERNMENT

## 2023-07-19 DIAGNOSIS — Z12.39 SCREENING BREAST EXAMINATION: ICD-10-CM

## 2023-07-19 DIAGNOSIS — N60.91 BENIGN MAMMARY DYSPLASIA OF RIGHT BREAST: ICD-10-CM

## 2023-07-19 PROCEDURE — 6360000004 HC RX CONTRAST MEDICATION: Performed by: SURGERY

## 2023-07-19 PROCEDURE — C8908 MRI W/O FOL W/CONT, BREAST,: HCPCS

## 2023-07-19 PROCEDURE — A9579 GAD-BASE MR CONTRAST NOS,1ML: HCPCS | Performed by: SURGERY

## 2023-07-19 RX ADMIN — GADOTERIDOL 13 ML: 279.3 INJECTION, SOLUTION INTRAVENOUS at 12:26

## 2023-07-24 ENCOUNTER — TELEPHONE (OUTPATIENT)
Dept: WOMENS IMAGING | Age: 53
End: 2023-07-24

## 2023-07-24 NOTE — TELEPHONE ENCOUNTER
[facility]  [facility], [address] [place] [address] [facility]   Phone: [phone]          NAME:  Anthony Couch OF BIRTH:  1970  MEDICAL RECORD NUMBER:  [id number]  TODAY'S DATE:  7/24/2023      Referring Physician: Dr. Yuri Farah    Procedure: MRI    Right breast    Date of biopsy: 7/28/23    Patient taking blood thinners: ASA, holding x 2 days prior    Medicine allergies: yes - see chart    Special Instructions: n/a    Reviewed pre and post biopsy instructions/information with patient. Pt verbalized understanding. Biopsy order form faxed to referring MD.      Nurse Navigator reviewed the education with the patient regarding an MRI biopsy:  *The technologist or a nurse may ask if you have allergies of any kind, such as an allergy to iodine or x-ray contrast material, drugs, food, or the environment, or if you have asthma. The contrast material most commonly used for an MRI exam contains a metal called gadolinium. It is fine to eat and drink prior to the procedure and we prefer that you do so. Bring a written list of the medications you are taking. Plan on being at the breast center for 2 -3 hours. Wear a bra with good support (like a sports bra) and a two-piece comfortable outfit for the procedure. You can bring someone with you but it is not required, since this is done with local anesthetic. You may drive yourself, or bring a family member or friend, whatever is comfortable and supportive. If you have claustrophobia (fear of enclosed spaces) or anxiety, you may want to ask your physician for a prescription for a mild sedative prior to your scheduled examination. If this is done, please have someone drive you to the procedure. During your biopsy: You will be lying on your stomach for this procedure just as when you had the MRI.    If a contrast material will be used in the MRI exam, the technologist will insert an intravenous (IV)

## 2023-07-24 NOTE — TELEPHONE ENCOUNTER
Called to schedule recommended breast biopsy. No answer and VM has not been set up. Will call later today.  Thierry Saravia RN

## 2023-07-25 ENCOUNTER — OFFICE VISIT (OUTPATIENT)
Dept: ORTHOPEDIC SURGERY | Age: 53
End: 2023-07-25

## 2023-07-25 VITALS — BODY MASS INDEX: 22.13 KG/M2 | WEIGHT: 146 LBS | HEIGHT: 68 IN

## 2023-07-25 DIAGNOSIS — M25.511 RIGHT SHOULDER PAIN, UNSPECIFIED CHRONICITY: Primary | ICD-10-CM

## 2023-07-25 DIAGNOSIS — M75.01 ADHESIVE CAPSULITIS OF BOTH SHOULDERS: ICD-10-CM

## 2023-07-25 DIAGNOSIS — M75.02 ADHESIVE CAPSULITIS OF BOTH SHOULDERS: ICD-10-CM

## 2023-07-25 DIAGNOSIS — Z00.00 HEALTHCARE MAINTENANCE: ICD-10-CM

## 2023-07-25 LAB
ALBUMIN SERPL-MCNC: 4.6 G/DL (ref 3.4–5)
ALBUMIN/GLOB SERPL: 1.7 {RATIO} (ref 1.1–2.2)
ALP SERPL-CCNC: 67 U/L (ref 40–129)
ALT SERPL-CCNC: 9 U/L (ref 10–40)
ANION GAP SERPL CALCULATED.3IONS-SCNC: 9 MMOL/L (ref 3–16)
AST SERPL-CCNC: 19 U/L (ref 15–37)
BASOPHILS # BLD: 0 K/UL (ref 0–0.2)
BASOPHILS NFR BLD: 1.1 %
BILIRUB SERPL-MCNC: 0.5 MG/DL (ref 0–1)
BUN SERPL-MCNC: 17 MG/DL (ref 7–20)
CALCIUM SERPL-MCNC: 10 MG/DL (ref 8.3–10.6)
CHLORIDE SERPL-SCNC: 104 MMOL/L (ref 99–110)
CHOLEST SERPL-MCNC: 203 MG/DL (ref 0–199)
CO2 SERPL-SCNC: 28 MMOL/L (ref 21–32)
CREAT SERPL-MCNC: 0.8 MG/DL (ref 0.6–1.1)
DEPRECATED RDW RBC AUTO: 13.2 % (ref 12.4–15.4)
EOSINOPHIL # BLD: 0.1 K/UL (ref 0–0.6)
EOSINOPHIL NFR BLD: 1.5 %
GFR SERPLBLD CREATININE-BSD FMLA CKD-EPI: >60 ML/MIN/{1.73_M2}
GLUCOSE SERPL-MCNC: 91 MG/DL (ref 70–99)
HCT VFR BLD AUTO: 40.7 % (ref 36–48)
HDLC SERPL-MCNC: 76 MG/DL (ref 40–60)
HGB BLD-MCNC: 14 G/DL (ref 12–16)
LDLC SERPL CALC-MCNC: 115 MG/DL
LYMPHOCYTES # BLD: 1.5 K/UL (ref 1–5.1)
LYMPHOCYTES NFR BLD: 42.9 %
MCH RBC QN AUTO: 31.6 PG (ref 26–34)
MCHC RBC AUTO-ENTMCNC: 34.3 G/DL (ref 31–36)
MCV RBC AUTO: 92 FL (ref 80–100)
MONOCYTES # BLD: 0.3 K/UL (ref 0–1.3)
MONOCYTES NFR BLD: 8.1 %
NEUTROPHILS # BLD: 1.7 K/UL (ref 1.7–7.7)
NEUTROPHILS NFR BLD: 46.4 %
PLATELET # BLD AUTO: 265 K/UL (ref 135–450)
PMV BLD AUTO: 9.5 FL (ref 5–10.5)
POTASSIUM SERPL-SCNC: 4.3 MMOL/L (ref 3.5–5.1)
PROT SERPL-MCNC: 7.3 G/DL (ref 6.4–8.2)
RBC # BLD AUTO: 4.42 M/UL (ref 4–5.2)
SODIUM SERPL-SCNC: 141 MMOL/L (ref 136–145)
TRIGL SERPL-MCNC: 59 MG/DL (ref 0–150)
VLDLC SERPL CALC-MCNC: 12 MG/DL
WBC # BLD AUTO: 3.6 K/UL (ref 4–11)

## 2023-07-25 RX ORDER — TRIAMCINOLONE ACETONIDE 40 MG/ML
40 INJECTION, SUSPENSION INTRA-ARTICULAR; INTRAMUSCULAR ONCE
Status: COMPLETED | OUTPATIENT
Start: 2023-07-25 | End: 2023-07-25

## 2023-07-25 RX ORDER — BUPIVACAINE HYDROCHLORIDE 2.5 MG/ML
30 INJECTION, SOLUTION INFILTRATION; PERINEURAL ONCE
Status: COMPLETED | OUTPATIENT
Start: 2023-07-25 | End: 2023-07-25

## 2023-07-25 RX ORDER — LIDOCAINE HYDROCHLORIDE 10 MG/ML
20 INJECTION, SOLUTION INFILTRATION; PERINEURAL ONCE
Status: COMPLETED | OUTPATIENT
Start: 2023-07-25 | End: 2023-07-25

## 2023-07-25 RX ADMIN — BUPIVACAINE HYDROCHLORIDE 75 MG: 2.5 INJECTION, SOLUTION INFILTRATION; PERINEURAL at 09:45

## 2023-07-25 RX ADMIN — TRIAMCINOLONE ACETONIDE 40 MG: 40 INJECTION, SUSPENSION INTRA-ARTICULAR; INTRAMUSCULAR at 09:46

## 2023-07-25 RX ADMIN — LIDOCAINE HYDROCHLORIDE 20 ML: 10 INJECTION, SOLUTION INFILTRATION; PERINEURAL at 09:46

## 2023-07-25 NOTE — PROGRESS NOTES
arise.    Right Shoulder Cortisone Injection: Glenohumeral CPT 89013  Consent was obtained after discussion of the risks, benefits, alternatives, including, but not limited to bleeding, pain, infection, skin disruption or discoloration. Laterality was confirmed (timeout). The shoulder was prepped with alcohol. A formulation of 2cc of 40mg/ml Kenalog, 4cc of 1% lidocaine, 4cc of 0.25% marcaine was injected into the glenohumeral joint space with a 25 gauge needle without difficulty. The site was cleaned and dressed with a band aid. She tolerated this well and there were no complications. I discussed with David Villareal that her history, symptoms, signs, and imaging are most consistent with frozen shoulder. We reviewed the natural history of these conditions and treatment options ranging from conservative measures (rest, icing, activity modification, physical therapy, pain meds, cortisone injection) to surgical options. In terms of treatment, I recommended continuing with rest, icing, avoidance of painful activities, NSAIDs or pain meds as tolerated, and physical therapy. If these are not effective, cortisone injection can be considered. We discussed surgical options as well, should conservative measures fail. Electronically signed by Jorge Birmingham MD on 7/25/2023 at 9:43 AM  This dictation was generated by voice recognition computer software. Although all attempts are made to edit the dictation for accuracy, there may be errors in the transcription that are not intended.

## 2023-07-28 ENCOUNTER — HOSPITAL ENCOUNTER (OUTPATIENT)
Dept: MRI IMAGING | Age: 53
Discharge: HOME | End: 2023-07-28
Payer: OTHER GOVERNMENT

## 2023-07-28 ENCOUNTER — HOSPITAL ENCOUNTER (OUTPATIENT)
Dept: WOMENS IMAGING | Age: 53
Discharge: HOME | End: 2023-07-28
Payer: OTHER GOVERNMENT

## 2023-07-28 DIAGNOSIS — R92.8 ABNORMAL FINDING ON BREAST IMAGING: ICD-10-CM

## 2023-07-28 DIAGNOSIS — R92.8 ABNORMAL MAMMOGRAM OF RIGHT BREAST: ICD-10-CM

## 2023-07-28 PROCEDURE — G0279 TOMOSYNTHESIS, MAMMO: HCPCS

## 2023-07-28 PROCEDURE — 19085 BX BREAST 1ST LESION MR IMAG: CPT

## 2023-07-28 NOTE — PROGRESS NOTES
Results will be available in 2-3 working days. Your referring physician or the Nurse Navigator will call you with results. The [facility] Information:   Should you experience any significant changes in your health or have questions about your care, please contact:  Yisel Shaikh Nurse Breast Navigator with [facility] [address]  [phone]  Monday-Friday. If you need help with your care outside these hours and cannot wait until we are again available, contact your Physician or go to the hospital emergency room.         Electronically signed by Yisel Shaikh RN on 7/28/2023 at 2:56 PM

## 2023-07-31 ENCOUNTER — TELEPHONE (OUTPATIENT)
Dept: WOMENS IMAGING | Age: 53
End: 2023-07-31

## 2023-07-31 NOTE — TELEPHONE ENCOUNTER
Faxed patient's breast path report to her breast surgeon, to review with patient.  Brandie Hilliard RN

## 2024-09-23 ENCOUNTER — HOSPITAL ENCOUNTER (OUTPATIENT)
Dept: MRI IMAGING | Age: 54
Discharge: HOME | End: 2024-09-23
Payer: OTHER GOVERNMENT

## 2024-09-23 DIAGNOSIS — N60.91 BENIGN MAMMARY DYSPLASIA OF RIGHT BREAST: ICD-10-CM

## 2024-09-23 DIAGNOSIS — Z12.39 SCREENING BREAST EXAMINATION: ICD-10-CM

## 2024-09-23 PROCEDURE — A9579 GAD-BASE MR CONTRAST NOS,1ML: HCPCS | Performed by: SURGERY

## 2024-09-23 PROCEDURE — C8908 MRI W/O FOL W/CONT, BREAST,: HCPCS

## 2024-09-23 PROCEDURE — 6360000004 HC RX CONTRAST MEDICATION: Performed by: SURGERY

## 2024-09-23 RX ADMIN — GADOTERIDOL 13 ML: 279.3 INJECTION, SOLUTION INTRAVENOUS at 13:14

## (undated) DEVICE — CANNULA NSL AD L7FT DIV O2 CO2 W/ M LUERLOCK TRMPT CONN

## (undated) DEVICE — Z DISCONTINUED USE 2276105 GOWN PROTCT UNIV CHST W28IN L49IN SL 24IN BLU SPUNBOND FLM

## (undated) DEVICE — ELECTRODE ECG MONITR FOAM TEAR DROP ADLT RED

## (undated) DEVICE — KIT INF CTRL 2OZ LUB TBNG L12FT DBL END BRSH SYR OP4